# Patient Record
Sex: MALE | Race: WHITE | NOT HISPANIC OR LATINO | Employment: FULL TIME | URBAN - METROPOLITAN AREA
[De-identification: names, ages, dates, MRNs, and addresses within clinical notes are randomized per-mention and may not be internally consistent; named-entity substitution may affect disease eponyms.]

---

## 2019-05-31 ENCOUNTER — OFFICE VISIT (OUTPATIENT)
Dept: FAMILY MEDICINE CLINIC | Facility: CLINIC | Age: 59
End: 2019-05-31
Payer: COMMERCIAL

## 2019-05-31 VITALS
HEART RATE: 73 BPM | SYSTOLIC BLOOD PRESSURE: 152 MMHG | WEIGHT: 194 LBS | DIASTOLIC BLOOD PRESSURE: 90 MMHG | BODY MASS INDEX: 33.12 KG/M2 | HEIGHT: 64 IN | OXYGEN SATURATION: 98 % | RESPIRATION RATE: 16 BRPM

## 2019-05-31 DIAGNOSIS — E78.5 BORDERLINE HYPERLIPIDEMIA: ICD-10-CM

## 2019-05-31 DIAGNOSIS — Z23 NEED FOR TDAP VACCINATION: ICD-10-CM

## 2019-05-31 DIAGNOSIS — Z12.5 PROSTATE CANCER SCREENING: ICD-10-CM

## 2019-05-31 DIAGNOSIS — I10 BENIGN ESSENTIAL HYPERTENSION: ICD-10-CM

## 2019-05-31 DIAGNOSIS — J30.2 SEASONAL ALLERGIES: ICD-10-CM

## 2019-05-31 DIAGNOSIS — Z76.89 ENCOUNTER TO ESTABLISH CARE: Primary | ICD-10-CM

## 2019-05-31 DIAGNOSIS — Z00.00 ANNUAL PHYSICAL EXAM: ICD-10-CM

## 2019-05-31 PROCEDURE — 99386 PREV VISIT NEW AGE 40-64: CPT | Performed by: FAMILY MEDICINE

## 2019-05-31 PROCEDURE — 90715 TDAP VACCINE 7 YRS/> IM: CPT | Performed by: FAMILY MEDICINE

## 2019-05-31 PROCEDURE — 90471 IMMUNIZATION ADMIN: CPT | Performed by: FAMILY MEDICINE

## 2019-05-31 PROCEDURE — 99203 OFFICE O/P NEW LOW 30 MIN: CPT | Performed by: FAMILY MEDICINE

## 2019-05-31 RX ORDER — METOPROLOL TARTRATE 50 MG/1
TABLET, FILM COATED ORAL 2 TIMES DAILY
COMMUNITY
Start: 2008-10-06 | End: 2019-05-31 | Stop reason: SDUPTHER

## 2019-05-31 RX ORDER — LORATADINE 10 MG/1
10 TABLET ORAL DAILY
COMMUNITY

## 2019-05-31 RX ORDER — METOPROLOL TARTRATE 50 MG/1
50 TABLET, FILM COATED ORAL 2 TIMES DAILY
Qty: 60 TABLET | Refills: 0 | Status: SHIPPED | OUTPATIENT
Start: 2019-05-31 | End: 2019-06-28 | Stop reason: SDUPTHER

## 2019-05-31 RX ORDER — FLUTICASONE PROPIONATE 50 MCG
1 SPRAY, SUSPENSION (ML) NASAL DAILY
Qty: 1 BOTTLE | Refills: 0 | Status: SHIPPED | OUTPATIENT
Start: 2019-05-31 | End: 2019-11-14 | Stop reason: HOSPADM

## 2019-06-06 LAB
ALBUMIN SERPL-MCNC: 4.9 G/DL (ref 3.5–5.5)
ALBUMIN/CREAT UR: 3.1 MG/G CREAT (ref 0–30)
ALBUMIN/GLOB SERPL: 2.2 {RATIO} (ref 1.2–2.2)
ALP SERPL-CCNC: 62 IU/L (ref 39–117)
ALT SERPL-CCNC: 24 IU/L (ref 0–44)
AST SERPL-CCNC: 19 IU/L (ref 0–40)
BASOPHILS # BLD AUTO: 0.1 X10E3/UL (ref 0–0.2)
BASOPHILS NFR BLD AUTO: 1 %
BILIRUB SERPL-MCNC: 0.5 MG/DL (ref 0–1.2)
BUN SERPL-MCNC: 21 MG/DL (ref 6–24)
BUN/CREAT SERPL: 18 (ref 9–20)
CALCIUM SERPL-MCNC: 9.7 MG/DL (ref 8.7–10.2)
CHLORIDE SERPL-SCNC: 103 MMOL/L (ref 96–106)
CHOLEST SERPL-MCNC: 243 MG/DL (ref 100–199)
CO2 SERPL-SCNC: 23 MMOL/L (ref 20–29)
CREAT SERPL-MCNC: 1.17 MG/DL (ref 0.76–1.27)
CREAT UR-MCNC: 234.9 MG/DL
EOSINOPHIL # BLD AUTO: 0.2 X10E3/UL (ref 0–0.4)
EOSINOPHIL NFR BLD AUTO: 3 %
ERYTHROCYTE [DISTWIDTH] IN BLOOD BY AUTOMATED COUNT: 14.2 % (ref 12.3–15.4)
GLOBULIN SER-MCNC: 2.2 G/DL (ref 1.5–4.5)
GLUCOSE SERPL-MCNC: 99 MG/DL (ref 65–99)
HCT VFR BLD AUTO: 50.4 % (ref 37.5–51)
HDLC SERPL-MCNC: 39 MG/DL
HGB BLD-MCNC: 16.9 G/DL (ref 13–17.7)
IMM GRANULOCYTES # BLD: 0 X10E3/UL (ref 0–0.1)
IMM GRANULOCYTES NFR BLD: 0 %
LABCORP COMMENT: NORMAL
LDLC SERPL CALC-MCNC: 162 MG/DL (ref 0–99)
LYMPHOCYTES # BLD AUTO: 2.1 X10E3/UL (ref 0.7–3.1)
LYMPHOCYTES NFR BLD AUTO: 40 %
MCH RBC QN AUTO: 29 PG (ref 26.6–33)
MCHC RBC AUTO-ENTMCNC: 33.5 G/DL (ref 31.5–35.7)
MCV RBC AUTO: 87 FL (ref 79–97)
MICROALBUMIN UR-MCNC: 7.3 UG/ML
MONOCYTES # BLD AUTO: 0.4 X10E3/UL (ref 0.1–0.9)
MONOCYTES NFR BLD AUTO: 7 %
NEUTROPHILS # BLD AUTO: 2.5 X10E3/UL (ref 1.4–7)
NEUTROPHILS NFR BLD AUTO: 49 %
PLATELET # BLD AUTO: 212 X10E3/UL (ref 150–450)
POTASSIUM SERPL-SCNC: 4.4 MMOL/L (ref 3.5–5.2)
PROT SERPL-MCNC: 7.1 G/DL (ref 6–8.5)
PSA SERPL-MCNC: 3 NG/ML (ref 0–4)
RBC # BLD AUTO: 5.82 X10E6/UL (ref 4.14–5.8)
SL AMB EGFR AFRICAN AMERICAN: 79 ML/MIN/1.73
SL AMB EGFR NON AFRICAN AMERICAN: 68 ML/MIN/1.73
SL AMB VLDL CHOLESTEROL CALC: 42 MG/DL (ref 5–40)
SODIUM SERPL-SCNC: 144 MMOL/L (ref 134–144)
TRIGL SERPL-MCNC: 209 MG/DL (ref 0–149)
WBC # BLD AUTO: 5.2 X10E3/UL (ref 3.4–10.8)

## 2019-06-28 ENCOUNTER — OFFICE VISIT (OUTPATIENT)
Dept: FAMILY MEDICINE CLINIC | Facility: CLINIC | Age: 59
End: 2019-06-28
Payer: COMMERCIAL

## 2019-06-28 VITALS
WEIGHT: 194 LBS | RESPIRATION RATE: 16 BRPM | OXYGEN SATURATION: 96 % | DIASTOLIC BLOOD PRESSURE: 80 MMHG | HEART RATE: 85 BPM | HEIGHT: 64 IN | BODY MASS INDEX: 33.12 KG/M2 | SYSTOLIC BLOOD PRESSURE: 136 MMHG

## 2019-06-28 DIAGNOSIS — E78.2 MIXED HYPERLIPIDEMIA: ICD-10-CM

## 2019-06-28 DIAGNOSIS — I10 BENIGN ESSENTIAL HYPERTENSION: Primary | ICD-10-CM

## 2019-06-28 PROBLEM — M47.812 CERVICAL SPONDYLOSIS WITHOUT MYELOPATHY: Status: ACTIVE | Noted: 2019-06-28

## 2019-06-28 PROBLEM — G56.20 ULNAR NERVE ABNORMALITY: Status: ACTIVE | Noted: 2019-06-28

## 2019-06-28 PROBLEM — G56.00 CARPAL TUNNEL SYNDROME: Status: ACTIVE | Noted: 2019-06-28

## 2019-06-28 PROCEDURE — 99214 OFFICE O/P EST MOD 30 MIN: CPT | Performed by: FAMILY MEDICINE

## 2019-06-28 PROCEDURE — 1036F TOBACCO NON-USER: CPT | Performed by: FAMILY MEDICINE

## 2019-06-28 PROCEDURE — 3075F SYST BP GE 130 - 139MM HG: CPT | Performed by: FAMILY MEDICINE

## 2019-06-28 PROCEDURE — 3008F BODY MASS INDEX DOCD: CPT | Performed by: FAMILY MEDICINE

## 2019-06-28 PROCEDURE — 3079F DIAST BP 80-89 MM HG: CPT | Performed by: FAMILY MEDICINE

## 2019-06-28 RX ORDER — ROSUVASTATIN CALCIUM 10 MG/1
10 TABLET, COATED ORAL DAILY
Qty: 30 TABLET | Refills: 0 | Status: SHIPPED | OUTPATIENT
Start: 2019-06-28 | End: 2019-07-26 | Stop reason: SINTOL

## 2019-06-28 RX ORDER — METOPROLOL TARTRATE 50 MG/1
50 TABLET, FILM COATED ORAL 2 TIMES DAILY
Qty: 180 TABLET | Refills: 0 | Status: SHIPPED | OUTPATIENT
Start: 2019-06-28 | End: 2019-06-28 | Stop reason: SDUPTHER

## 2019-06-28 RX ORDER — METOPROLOL TARTRATE 50 MG/1
50 TABLET, FILM COATED ORAL 2 TIMES DAILY
Qty: 60 TABLET | Refills: 0 | Status: SHIPPED | OUTPATIENT
Start: 2019-06-28 | End: 2019-06-28 | Stop reason: SDUPTHER

## 2019-06-28 RX ORDER — METOPROLOL TARTRATE 50 MG/1
50 TABLET, FILM COATED ORAL 2 TIMES DAILY
Qty: 60 TABLET | Refills: 0 | Status: SHIPPED | OUTPATIENT
Start: 2019-06-28 | End: 2019-07-26 | Stop reason: SDUPTHER

## 2019-07-26 ENCOUNTER — OFFICE VISIT (OUTPATIENT)
Dept: FAMILY MEDICINE CLINIC | Facility: CLINIC | Age: 59
End: 2019-07-26
Payer: COMMERCIAL

## 2019-07-26 VITALS
BODY MASS INDEX: 32.95 KG/M2 | SYSTOLIC BLOOD PRESSURE: 140 MMHG | OXYGEN SATURATION: 98 % | WEIGHT: 193 LBS | HEART RATE: 62 BPM | HEIGHT: 64 IN | RESPIRATION RATE: 20 BRPM | DIASTOLIC BLOOD PRESSURE: 86 MMHG

## 2019-07-26 DIAGNOSIS — I10 BENIGN ESSENTIAL HYPERTENSION: Primary | ICD-10-CM

## 2019-07-26 DIAGNOSIS — E78.2 MIXED HYPERLIPIDEMIA: ICD-10-CM

## 2019-07-26 PROCEDURE — 99214 OFFICE O/P EST MOD 30 MIN: CPT | Performed by: FAMILY MEDICINE

## 2019-07-26 PROCEDURE — 1036F TOBACCO NON-USER: CPT | Performed by: FAMILY MEDICINE

## 2019-07-26 PROCEDURE — 3008F BODY MASS INDEX DOCD: CPT | Performed by: FAMILY MEDICINE

## 2019-07-26 RX ORDER — METOPROLOL TARTRATE 50 MG/1
50 TABLET, FILM COATED ORAL 2 TIMES DAILY
Qty: 60 TABLET | Refills: 2 | Status: SHIPPED | OUTPATIENT
Start: 2019-07-26 | End: 2019-09-13 | Stop reason: SDUPTHER

## 2019-07-26 RX ORDER — FENOFIBRATE 145 MG/1
145 TABLET, COATED ORAL DAILY
Qty: 30 TABLET | Refills: 2 | Status: SHIPPED | OUTPATIENT
Start: 2019-07-26 | End: 2019-10-25

## 2019-07-26 NOTE — PROGRESS NOTES
Assessment/Plan:   Diagnoses and all orders for this visit:    Benign essential hypertension  -     metoprolol tartrate (LOPRESSOR) 50 mg tablet; Take 1 tablet (50 mg total) by mouth 2 (two) times a day  - BP in the office 128/88 and on my repeat 140/84 and 140/86   - BP per pt's cuff = 154/98 and 150/96 - advised to take cuff to local pharmacy to get it calibrated   - on BB and tolerating it well   - reviewed nml CMP and urine microalbumin  - has started to exercise and advised continued dietary modifications    - (+) FHx of HTN in both parents   - has to schedule his annual Optho exam   - RTO in 3months for f/u - pt aware and agreeable     Mixed hyperlipidemia  -     fenofibrate (TRICOR) 145 mg tablet; Take 1 tablet (145 mg total) by mouth daily  - (+) cognitive impairment with statin - will switch to Tricor QD     BMI 33 0-33 9,adult  - educated that it takes 3500 abelardo to lose 1lb  - advised to cut down on carbs, 5 servings of fruits/veggies/day, increase water intake (65-80oz/water/day)   - appropriate weight loss goal for men = 1-2lb/week  - per the [employer] Association - 150mins/exercise/week, but to lose and maintain weight 200-300mins/exercise/week    Other orders  -     Cancel: Hepatitis C antibody; Future          Subjective:    Patient ID: Sabiha Rhoades is a 63 y o  male    62yo M presents to the office for f/u   1) HTN  - BP in the office 128/88 and on my repeat 140/84 and 140/86 and on pt's cuff 154/98 and 150/96   - today in the office pt denies F/C/N/V/change in vision/CP/palpitations/SOB/wheezing/abd pain/LE edema  - has to schedule an Optho appt, no occular migraines since last OV   - has started exercising and now running/walking 2miles QD  - diet: "not a lot of beef, eats chicken and rice"   - (+) FHx of HTN in both parents   - not a smoker   2) HL  - Lipids: , , HDL 39,   - current 10yr ASCVD risk of 13 8%   - was started on Crestor 10mg QHS at last OV 1month ago   - denies myalgias, HA but (+) fogginess, difficulty concentrating and falling asleep       The following portions of the patient's history were reviewed and updated as appropriate: allergies, current medications, past family history, past medical history, past social history, past surgical history and problem list     Review of Systems  as per HPI    Objective:  /86 (BP Location: Left arm, Patient Position: Sitting, Cuff Size: Large)   Pulse 62   Resp 20   Ht 5' 4" (1 626 m)   Wt 87 5 kg (193 lb)   SpO2 98%   BMI 33 13 kg/m²    Physical Exam   Constitutional: He is oriented to person, place, and time  He appears well-developed and well-nourished  No distress  HENT:   Head: Normocephalic and atraumatic  Right Ear: External ear normal    Left Ear: External ear normal    Nose: Nose normal    Eyes: Conjunctivae and EOM are normal  Right eye exhibits no discharge  Left eye exhibits no discharge  No scleral icterus  Neck: Normal range of motion  Neck supple  Cardiovascular: Normal rate, regular rhythm and normal heart sounds  Exam reveals no gallop and no friction rub  No murmur heard  Pulmonary/Chest: Effort normal and breath sounds normal  No stridor  No respiratory distress  He has no wheezes  He has no rales  Abdominal: Soft  Bowel sounds are normal    Musculoskeletal: Normal range of motion  He exhibits no edema, tenderness or deformity  Neurological: He is alert and oriented to person, place, and time  Skin: Skin is warm  No rash noted  He is not diaphoretic  No erythema  No pallor  Psychiatric: He has a normal mood and affect  His behavior is normal    Vitals reviewed  BMI Counseling: Body mass index is 33 13 kg/m²  Discussed the patient's BMI with him  The BMI is above average  BMI counseling and education was provided to the patient   Nutrition recommendations include reducing portion sizes, decreasing overall calorie intake, consuming healthier snacks, moderation in carbohydrate intake and reducing intake of cholesterol  Exercise recommendations include exercising 3-5 times per week

## 2019-09-13 DIAGNOSIS — I10 BENIGN ESSENTIAL HYPERTENSION: ICD-10-CM

## 2019-09-13 RX ORDER — METOPROLOL TARTRATE 50 MG/1
TABLET, FILM COATED ORAL
Qty: 180 TABLET | Refills: 0 | Status: SHIPPED | OUTPATIENT
Start: 2019-09-13 | End: 2019-10-25 | Stop reason: SDUPTHER

## 2019-10-25 ENCOUNTER — OFFICE VISIT (OUTPATIENT)
Dept: FAMILY MEDICINE CLINIC | Facility: CLINIC | Age: 59
End: 2019-10-25
Payer: COMMERCIAL

## 2019-10-25 VITALS
SYSTOLIC BLOOD PRESSURE: 124 MMHG | RESPIRATION RATE: 16 BRPM | WEIGHT: 191 LBS | BODY MASS INDEX: 32.61 KG/M2 | HEART RATE: 62 BPM | DIASTOLIC BLOOD PRESSURE: 70 MMHG | HEIGHT: 64 IN | OXYGEN SATURATION: 99 %

## 2019-10-25 DIAGNOSIS — Z23 NEED FOR INFLUENZA VACCINATION: ICD-10-CM

## 2019-10-25 DIAGNOSIS — E78.2 MIXED HYPERLIPIDEMIA: ICD-10-CM

## 2019-10-25 DIAGNOSIS — Z11.59 NEED FOR HEPATITIS C SCREENING TEST: ICD-10-CM

## 2019-10-25 DIAGNOSIS — I10 BENIGN ESSENTIAL HYPERTENSION: Primary | ICD-10-CM

## 2019-10-25 PROBLEM — S46.009A INJURY OF TENDON OF ROTATOR CUFF: Status: ACTIVE | Noted: 2019-06-11

## 2019-10-25 PROCEDURE — 3008F BODY MASS INDEX DOCD: CPT | Performed by: FAMILY MEDICINE

## 2019-10-25 PROCEDURE — 3078F DIAST BP <80 MM HG: CPT | Performed by: FAMILY MEDICINE

## 2019-10-25 PROCEDURE — 90682 RIV4 VACC RECOMBINANT DNA IM: CPT | Performed by: FAMILY MEDICINE

## 2019-10-25 PROCEDURE — 99214 OFFICE O/P EST MOD 30 MIN: CPT | Performed by: FAMILY MEDICINE

## 2019-10-25 PROCEDURE — 3074F SYST BP LT 130 MM HG: CPT | Performed by: FAMILY MEDICINE

## 2019-10-25 PROCEDURE — 90471 IMMUNIZATION ADMIN: CPT | Performed by: FAMILY MEDICINE

## 2019-10-25 RX ORDER — METOPROLOL TARTRATE 50 MG/1
50 TABLET, FILM COATED ORAL 2 TIMES DAILY
Qty: 180 TABLET | Refills: 0 | Status: SHIPPED | OUTPATIENT
Start: 2019-10-25 | End: 2019-12-18 | Stop reason: SDUPTHER

## 2019-10-25 RX ORDER — ROSUVASTATIN CALCIUM 5 MG/1
5 TABLET, COATED ORAL
Qty: 15 TABLET | Refills: 0 | Status: SHIPPED | OUTPATIENT
Start: 2019-10-25 | End: 2019-11-14 | Stop reason: HOSPADM

## 2019-10-25 RX ORDER — METOPROLOL TARTRATE 50 MG/1
50 TABLET, FILM COATED ORAL 2 TIMES DAILY
Qty: 60 TABLET | Refills: 0 | Status: SHIPPED | OUTPATIENT
Start: 2019-10-25 | End: 2019-10-25 | Stop reason: SDUPTHER

## 2019-10-25 NOTE — PROGRESS NOTES
Assessment/Plan:   Diagnoses and all orders for this visit:    Benign essential hypertension  -     Discontinue: metoprolol tartrate (LOPRESSOR) 50 mg tablet; Take 1 tablet (50 mg total) by mouth 2 (two) times a day  -     metoprolol tartrate (LOPRESSOR) 50 mg tablet; Take 1 tablet (50 mg total) by mouth 2 (two) times a day  - BP in the office 130/74 and on my repeat 124/70  - currently on BB on 50mg BID - cont current therapy   - did see Optho 2-3wks ago - will send for consult note  - discussed diet and exercise - of note, pt has lost 2lbs since last OV   - (+) FHx of HTN in both parents   - not a smoker     Need for influenza vaccination  -     Cancel: influenza vaccine, [id number], quadrivalent, 0 5 mL, preservative-free, for adult and pediatric patients 6 mos+ (AFLURIA, FLUARIX, FLULAVAL, FLUZONE)  -     influenza vaccine, [id number], quadrivalent, recombinant, PF, 0 5 mL, for patients 18 yr+ (FLUBLOK)    Mixed hyperlipidemia  -     rosuvastatin (CRESTOR) 5 mg tablet; Take 1 tablet (5 mg total) by mouth every other day  - Lipids (6/5/2019): , , HDL 39,   - current 10yr ASCVD risk of 13 8%   - (+) FHx of HL in Brother   - tried Crestor 10mg QD and Tricor 145mg QD - with cognitive impairment and pt self d/c'd and on Fish Oil  - given his 10yr ASCVD risk of 13 8% it is prudent that he be on a cholesterol lowering agent - will try Crestor 5mg Q48   - RTO in 1month for f/u - pt aware and agreeable     Need for hepatitis C screening test  -     Hepatitis C antibody; Future    Other orders  -     Cancel: influenza vaccine, [id number], quadrivalent, recombinant, PF, 0 5 mL, for patients 18 yr+ (FLUBLOK)        Subjective:    Patient ID: Mian Naidu is a 63 y o  male    64yo M presents to the office for f/u   1) HTN  - BP in the office 130/74 and on my repeat 124/70  - currently on BB on 50mg BID   - went to Optho - Dr Henrietta Fountain 2-3wks ago - per pt, "everything fine"   - today in the office pt denies F/C/N/V/change in vision/CP/palpitations/SOB/wheezing/abd pain/LE edema  - has to schedule an Optho appt, no occular migraines since last OV   - has started exercising and did a 5K for [employer], doing more projects around the house   - diet: "not a lot of beef, eats chicken and rice"   - (+) FHx of HTN in both parents   - not a smoker   2) HL  - Lipids (6/5/2019): , , HDL 39,   - current 10yr ASCVD risk of 13 8%   - was started on Tricor 145mg QHS - still with congnitive impairment (fogginess, difficulty concentrating, falling asleep - couldn't drive) and self d/c'd after a week   - does Fish Oil QD     The following portions of the patient's history were reviewed and updated as appropriate: allergies, current medications, past family history, past medical history, past social history, past surgical history and problem list     Review of Systems  as per HPI    Objective:  /70 (BP Location: Left arm, Patient Position: Sitting, Cuff Size: Standard)   Pulse 62   Resp 16   Ht 5' 4" (1 626 m)   Wt 86 6 kg (191 lb)   SpO2 99%   BMI 32 79 kg/m²    Physical Exam   Constitutional: He is oriented to person, place, and time  He appears well-developed and well-nourished  No distress  HENT:   Head: Normocephalic and atraumatic  Right Ear: External ear normal    Left Ear: External ear normal    Nose: Nose normal    Eyes: Conjunctivae and EOM are normal  Right eye exhibits no discharge  Left eye exhibits no discharge  No scleral icterus  Neck: Normal range of motion  Neck supple  Cardiovascular: Normal rate, regular rhythm and normal heart sounds  Exam reveals no gallop and no friction rub  No murmur heard  Pulmonary/Chest: Effort normal and breath sounds normal  No stridor  No respiratory distress  He has no wheezes  He has no rales  Abdominal: Soft  Bowel sounds are normal    BMI 32 8    Musculoskeletal: Normal range of motion  He exhibits no edema, tenderness or deformity  Neurological: He is alert and oriented to person, place, and time  Skin: Skin is warm  He is not diaphoretic  Psychiatric: He has a normal mood and affect  His behavior is normal    Vitals reviewed  BMI Counseling: Body mass index is 32 79 kg/m²  The BMI is above normal  Nutrition recommendations include reducing portion sizes, decreasing overall calorie intake, 3-5 servings of fruits/vegetables daily and consuming healthier snacks  Exercise recommendations include moderate aerobic physical activity for 150 minutes/week

## 2019-11-11 ENCOUNTER — HOSPITAL ENCOUNTER (INPATIENT)
Facility: HOSPITAL | Age: 59
LOS: 3 days | Discharge: HOME | DRG: 392 | End: 2019-11-14
Attending: EMERGENCY MEDICINE | Admitting: SURGERY
Payer: COMMERCIAL

## 2019-11-11 ENCOUNTER — OFFICE VISIT (OUTPATIENT)
Dept: URGENT CARE | Facility: CLINIC | Age: 59
End: 2019-11-11

## 2019-11-11 ENCOUNTER — APPOINTMENT (EMERGENCY)
Dept: CT IMAGING | Facility: HOSPITAL | Age: 59
DRG: 392 | End: 2019-11-11
Payer: COMMERCIAL

## 2019-11-11 VITALS
RESPIRATION RATE: 16 BRPM | HEIGHT: 63 IN | DIASTOLIC BLOOD PRESSURE: 86 MMHG | WEIGHT: 190.4 LBS | TEMPERATURE: 100.9 F | OXYGEN SATURATION: 98 % | SYSTOLIC BLOOD PRESSURE: 145 MMHG | BODY MASS INDEX: 33.73 KG/M2 | HEART RATE: 82 BPM

## 2019-11-11 DIAGNOSIS — K59.00 CONSTIPATION, UNSPECIFIED CONSTIPATION TYPE: Primary | ICD-10-CM

## 2019-11-11 DIAGNOSIS — K57.20 DIVERTICULITIS OF LARGE INTESTINE WITH PERFORATION WITHOUT BLEEDING: Primary | ICD-10-CM

## 2019-11-11 LAB
ANION GAP SERPL CALCULATED.3IONS-SCNC: 11 MMOL/L (ref 4–13)
BASOPHILS # BLD AUTO: 0.06 THOUSANDS/ΜL (ref 0–0.1)
BASOPHILS NFR BLD AUTO: 0 % (ref 0–1)
BUN SERPL-MCNC: 14 MG/DL (ref 5–25)
CALCIUM SERPL-MCNC: 9.6 MG/DL (ref 8.3–10.1)
CHLORIDE SERPL-SCNC: 98 MMOL/L (ref 100–108)
CO2 SERPL-SCNC: 28 MMOL/L (ref 21–32)
CREAT SERPL-MCNC: 1.07 MG/DL (ref 0.6–1.3)
EOSINOPHIL # BLD AUTO: 0 THOUSAND/ΜL (ref 0–0.61)
EOSINOPHIL NFR BLD AUTO: 0 % (ref 0–6)
ERYTHROCYTE [DISTWIDTH] IN BLOOD BY AUTOMATED COUNT: 12.3 % (ref 11.6–15.1)
GFR SERPL CREATININE-BSD FRML MDRD: 76 ML/MIN/1.73SQ M
GLUCOSE SERPL-MCNC: 119 MG/DL (ref 65–140)
HCT VFR BLD AUTO: 50.2 % (ref 36.5–49.3)
HGB BLD-MCNC: 17 G/DL (ref 12–17)
IMM GRANULOCYTES # BLD AUTO: 0.08 THOUSAND/UL (ref 0–0.2)
IMM GRANULOCYTES NFR BLD AUTO: 1 % (ref 0–2)
LYMPHOCYTES # BLD AUTO: 1.11 THOUSANDS/ΜL (ref 0.6–4.47)
LYMPHOCYTES NFR BLD AUTO: 7 % (ref 14–44)
MCH RBC QN AUTO: 28.8 PG (ref 26.8–34.3)
MCHC RBC AUTO-ENTMCNC: 33.9 G/DL (ref 31.4–37.4)
MCV RBC AUTO: 85 FL (ref 82–98)
MONOCYTES # BLD AUTO: 1.39 THOUSAND/ΜL (ref 0.17–1.22)
MONOCYTES NFR BLD AUTO: 9 % (ref 4–12)
NEUTROPHILS # BLD AUTO: 13.79 THOUSANDS/ΜL (ref 1.85–7.62)
NEUTS SEG NFR BLD AUTO: 83 % (ref 43–75)
NRBC BLD AUTO-RTO: 0 /100 WBCS
PLATELET # BLD AUTO: 212 THOUSANDS/UL (ref 149–390)
PMV BLD AUTO: 10.5 FL (ref 8.9–12.7)
POTASSIUM SERPL-SCNC: 4.1 MMOL/L (ref 3.5–5.3)
RBC # BLD AUTO: 5.9 MILLION/UL (ref 3.88–5.62)
SODIUM SERPL-SCNC: 137 MMOL/L (ref 136–145)
WBC # BLD AUTO: 16.43 THOUSAND/UL (ref 4.31–10.16)

## 2019-11-11 PROCEDURE — 99285 EMERGENCY DEPT VISIT HI MDM: CPT | Performed by: EMERGENCY MEDICINE

## 2019-11-11 PROCEDURE — 36415 COLL VENOUS BLD VENIPUNCTURE: CPT | Performed by: EMERGENCY MEDICINE

## 2019-11-11 PROCEDURE — 80048 BASIC METABOLIC PNL TOTAL CA: CPT | Performed by: EMERGENCY MEDICINE

## 2019-11-11 PROCEDURE — 85025 COMPLETE CBC W/AUTO DIFF WBC: CPT | Performed by: EMERGENCY MEDICINE

## 2019-11-11 PROCEDURE — 99285 EMERGENCY DEPT VISIT HI MDM: CPT

## 2019-11-11 PROCEDURE — 74177 CT ABD & PELVIS W/CONTRAST: CPT

## 2019-11-11 RX ORDER — HYDROMORPHONE HCL/PF 1 MG/ML
0.2 SYRINGE (ML) INJECTION EVERY 4 HOURS PRN
Status: DISCONTINUED | OUTPATIENT
Start: 2019-11-11 | End: 2019-11-12

## 2019-11-11 RX ORDER — CIPROFLOXACIN 2 MG/ML
400 INJECTION, SOLUTION INTRAVENOUS EVERY 12 HOURS
Status: DISCONTINUED | OUTPATIENT
Start: 2019-11-12 | End: 2019-11-14 | Stop reason: HOSPADM

## 2019-11-11 RX ORDER — CIPROFLOXACIN 2 MG/ML
400 INJECTION, SOLUTION INTRAVENOUS ONCE
Status: COMPLETED | OUTPATIENT
Start: 2019-11-11 | End: 2019-11-12

## 2019-11-11 RX ORDER — METOPROLOL TARTRATE 50 MG/1
50 TABLET, FILM COATED ORAL 2 TIMES DAILY
Status: DISCONTINUED | OUTPATIENT
Start: 2019-11-12 | End: 2019-11-14 | Stop reason: HOSPADM

## 2019-11-11 RX ORDER — ACETAMINOPHEN 325 MG/1
650 TABLET ORAL EVERY 6 HOURS PRN
Status: DISCONTINUED | OUTPATIENT
Start: 2019-11-11 | End: 2019-11-14 | Stop reason: HOSPADM

## 2019-11-11 RX ORDER — PRAVASTATIN SODIUM 40 MG
40 TABLET ORAL
Status: DISCONTINUED | OUTPATIENT
Start: 2019-11-12 | End: 2019-11-14 | Stop reason: HOSPADM

## 2019-11-11 RX ADMIN — IOHEXOL 100 ML: 350 INJECTION, SOLUTION INTRAVENOUS at 21:25

## 2019-11-12 PROBLEM — K57.92 DIVERTICULITIS: Status: ACTIVE | Noted: 2019-11-12

## 2019-11-12 LAB
ANION GAP SERPL CALCULATED.3IONS-SCNC: 9 MMOL/L (ref 4–13)
BASOPHILS # BLD AUTO: 0.06 THOUSANDS/ΜL (ref 0–0.1)
BASOPHILS NFR BLD AUTO: 1 % (ref 0–1)
BUN SERPL-MCNC: 11 MG/DL (ref 5–25)
CALCIUM SERPL-MCNC: 8.8 MG/DL (ref 8.3–10.1)
CHLORIDE SERPL-SCNC: 99 MMOL/L (ref 100–108)
CO2 SERPL-SCNC: 27 MMOL/L (ref 21–32)
CREAT SERPL-MCNC: 1.09 MG/DL (ref 0.6–1.3)
EOSINOPHIL # BLD AUTO: 0.01 THOUSAND/ΜL (ref 0–0.61)
EOSINOPHIL NFR BLD AUTO: 0 % (ref 0–6)
ERYTHROCYTE [DISTWIDTH] IN BLOOD BY AUTOMATED COUNT: 12.6 % (ref 11.6–15.1)
GFR SERPL CREATININE-BSD FRML MDRD: 74 ML/MIN/1.73SQ M
GLUCOSE SERPL-MCNC: 160 MG/DL (ref 65–140)
HCT VFR BLD AUTO: 47.4 % (ref 36.5–49.3)
HGB BLD-MCNC: 15.8 G/DL (ref 12–17)
IMM GRANULOCYTES # BLD AUTO: 0.05 THOUSAND/UL (ref 0–0.2)
IMM GRANULOCYTES NFR BLD AUTO: 0 % (ref 0–2)
LYMPHOCYTES # BLD AUTO: 1.06 THOUSANDS/ΜL (ref 0.6–4.47)
LYMPHOCYTES NFR BLD AUTO: 8 % (ref 14–44)
MCH RBC QN AUTO: 28.2 PG (ref 26.8–34.3)
MCHC RBC AUTO-ENTMCNC: 33.3 G/DL (ref 31.4–37.4)
MCV RBC AUTO: 85 FL (ref 82–98)
MONOCYTES # BLD AUTO: 0.97 THOUSAND/ΜL (ref 0.17–1.22)
MONOCYTES NFR BLD AUTO: 7 % (ref 4–12)
NEUTROPHILS # BLD AUTO: 10.88 THOUSANDS/ΜL (ref 1.85–7.62)
NEUTS SEG NFR BLD AUTO: 84 % (ref 43–75)
NRBC BLD AUTO-RTO: 0 /100 WBCS
PLATELET # BLD AUTO: 192 THOUSANDS/UL (ref 149–390)
PMV BLD AUTO: 10.8 FL (ref 8.9–12.7)
POTASSIUM SERPL-SCNC: 3.2 MMOL/L (ref 3.5–5.3)
RBC # BLD AUTO: 5.6 MILLION/UL (ref 3.88–5.62)
SODIUM SERPL-SCNC: 135 MMOL/L (ref 136–145)
WBC # BLD AUTO: 13.03 THOUSAND/UL (ref 4.31–10.16)

## 2019-11-12 PROCEDURE — 85025 COMPLETE CBC W/AUTO DIFF WBC: CPT | Performed by: PHYSICIAN ASSISTANT

## 2019-11-12 PROCEDURE — 80048 BASIC METABOLIC PNL TOTAL CA: CPT | Performed by: PHYSICIAN ASSISTANT

## 2019-11-12 RX ORDER — HYDROMORPHONE HCL/PF 1 MG/ML
0.5 SYRINGE (ML) INJECTION EVERY 4 HOURS PRN
Status: DISCONTINUED | OUTPATIENT
Start: 2019-11-12 | End: 2019-11-14 | Stop reason: HOSPADM

## 2019-11-12 RX ORDER — HYDROMORPHONE HCL/PF 1 MG/ML
0.2 SYRINGE (ML) INJECTION EVERY 4 HOURS PRN
Status: DISCONTINUED | OUTPATIENT
Start: 2019-11-12 | End: 2019-11-14 | Stop reason: HOSPADM

## 2019-11-12 RX ORDER — HYDROMORPHONE HCL/PF 1 MG/ML
1 SYRINGE (ML) INJECTION EVERY 4 HOURS PRN
Status: DISCONTINUED | OUTPATIENT
Start: 2019-11-12 | End: 2019-11-14 | Stop reason: HOSPADM

## 2019-11-12 RX ADMIN — METRONIDAZOLE 500 MG: 500 INJECTION, SOLUTION INTRAVENOUS at 22:54

## 2019-11-12 RX ADMIN — METRONIDAZOLE 500 MG: 500 INJECTION, SOLUTION INTRAVENOUS at 01:26

## 2019-11-12 RX ADMIN — ENOXAPARIN SODIUM 40 MG: 40 INJECTION SUBCUTANEOUS at 09:20

## 2019-11-12 RX ADMIN — CIPROFLOXACIN 400 MG: 2 INJECTION, SOLUTION INTRAVENOUS at 09:20

## 2019-11-12 RX ADMIN — METOPROLOL TARTRATE 50 MG: 50 TABLET, FILM COATED ORAL at 09:20

## 2019-11-12 RX ADMIN — CIPROFLOXACIN 400 MG: 2 INJECTION, SOLUTION INTRAVENOUS at 21:34

## 2019-11-12 RX ADMIN — METOPROLOL TARTRATE 50 MG: 50 TABLET, FILM COATED ORAL at 17:12

## 2019-11-12 RX ADMIN — METRONIDAZOLE 500 MG: 500 INJECTION, SOLUTION INTRAVENOUS at 06:37

## 2019-11-12 RX ADMIN — METRONIDAZOLE 500 MG: 500 INJECTION, SOLUTION INTRAVENOUS at 14:37

## 2019-11-12 RX ADMIN — CIPROFLOXACIN 400 MG: 2 INJECTION, SOLUTION INTRAVENOUS at 00:11

## 2019-11-12 NOTE — ED CARE HANDOFF
Emergency Department Sign Out Note        Sign out and transfer of care from Dr Reagan Sanon  See Separate Emergency Department note  The patient, Pierce Scanlon, was evaluated by the previous provider for abdominal pain  Workup Completed:  no    ED Course / Workup Pending (followup): Waiting for CT A/P                             Procedures  MDM  Number of Diagnoses or Management Options  Diverticulitis of large intestine with perforation without bleeding: new and requires workup  Diagnosis management comments: 11:05 PM  CT abdomen pelvis shows significant diverticulitis with several areas of micro perforation  Discussed the case with General surgery aPC who has agreed to admit the patient under Dr Humza Hastings service  Patient and family updated regarding the findings  Declines pain medication at this time         Amount and/or Complexity of Data Reviewed  Clinical lab tests: reviewed  Tests in the radiology section of CPT®: reviewed  Discuss the patient with other providers: yes    Risk of Complications, Morbidity, and/or Mortality  Presenting problems: high  Diagnostic procedures: high  Management options: high    Patient Progress  Patient progress: stable      Disposition  Final diagnoses:   Diverticulitis of large intestine with perforation without bleeding     Time reflects when diagnosis was documented in both MDM as applicable and the Disposition within this note     Time User Action Codes Description Comment    11/11/2019 10:17 PM Alee Rasheed Add [K57 20] Diverticulitis of large intestine with perforation without bleeding       ED Disposition     ED Disposition Condition Date/Time Comment    Admit Stable Mon Nov 11, 2019 10:16 PM Case was discussed with General Surgery and the patient's admission status was agreed to be Admission Status: inpatient status to the service of Dr Rakan Stevens    None       Patient's Medications   Discharge Prescriptions    No medications on file     No discharge procedures on file         ED Provider  Electronically Signed by     John Severino DO  11/11/19 6364

## 2019-11-12 NOTE — PLAN OF CARE
Problem: PAIN - ADULT  Goal: Verbalizes/displays adequate comfort level or baseline comfort level  Description  Interventions:  - Encourage patient to monitor pain and request assistance  - Assess pain using appropriate pain scale  - Administer analgesics based on type and severity of pain and evaluate response  - Implement non-pharmacological measures as appropriate and evaluate response  - Consider cultural and social influences on pain and pain management  - Notify physician/advanced practitioner if interventions unsuccessful or patient reports new pain  Outcome: Progressing     Problem: INFECTION - ADULT  Goal: Absence or prevention of progression during hospitalization  Description  INTERVENTIONS:  - Assess and monitor for signs and symptoms of infection  - Monitor lab/diagnostic results  - Monitor all insertion sites, i e  indwelling lines, tubes, and drains  - Monitor endotracheal if appropriate and nasal secretions for changes in amount and color  - [place] appropriate cooling/warming therapies per order  - Administer medications as ordered  - Instruct and encourage patient and family to use good hand hygiene technique  - Identify and instruct in appropriate isolation precautions for identified infection/condition  Outcome: Progressing  Goal: Absence of fever/infection during neutropenic period  Description  INTERVENTIONS:  - Monitor WBC    Outcome: Progressing     Problem: SAFETY ADULT  Goal: Patient will remain free of falls  Description  INTERVENTIONS:  - Assess patient frequently for physical needs  -  Identify cognitive and physical deficits and behaviors that affect risk of falls    -  [place] fall precautions as indicated by assessment   - Educate patient/family on patient safety including physical limitations  - Instruct patient to call for assistance with activity based on assessment  - Modify environment to reduce risk of injury  - Consider OT/PT consult to assist with strengthening/mobility  Outcome: Progressing  Goal: Maintain or return to baseline ADL function  Description  INTERVENTIONS:  -  Assess patient's ability to carry out ADLs; assess patient's baseline for ADL function and identify physical deficits which impact ability to perform ADLs (bathing, care of mouth/teeth, toileting, grooming, dressing, etc )  - Assess/evaluate cause of self-care deficits   - Assess range of motion  - Assess patient's mobility; develop plan if impaired  - Assess patient's need for assistive devices and provide as appropriate  - Encourage maximum independence but intervene and supervise when necessary  - Involve family in performance of ADLs  - Assess for home care needs following discharge   - Consider OT consult to assist with ADL evaluation and planning for discharge  - Provide patient education as appropriate  Outcome: Progressing  Goal: Maintain or return mobility status to optimal level  Description  INTERVENTIONS:  - Assess patient's baseline mobility status (ambulation, transfers, stairs, etc )    - Identify cognitive and physical deficits and behaviors that affect mobility  - Identify mobility aids required to assist with transfers and/or ambulation (gait belt, sit-to-stand, lift, walker, cane, etc )  - [place] fall precautions as indicated by assessment  - Record patient progress and toleration of activity level on Mobility SBAR; progress patient to next Phase/Stage  - Instruct patient to call for assistance with activity based on assessment  - Consider rehabilitation consult to assist with strengthening/weightbearing, etc   Outcome: Progressing

## 2019-11-12 NOTE — UTILIZATION REVIEW
Initial Clinical Review    Admission: Date/Time/Statement: Inpatient Admission Orders (From admission, onward)     Ordered        11/11/19 2249  Inpatient Admission (expected length of stay for this patient Order details is greater than two midnights)  Once                   Orders Placed This Encounter   Procedures    Inpatient Admission (expected length of stay for this patient Order details is greater than two midnights)     Standing Status:   Standing     Number of Occurrences:   1     Order Specific Question:   Admitting Physician     Answer:   LIZANDRO Koch [[id number]]     Order Specific Question:   Level of Care     Answer:   Med Surg [[id number]]     Order Specific Question:   Estimated length of stay     Answer:   More than 2 Midnights     Order Specific Question:   Certification     Answer:   I certify that inpatient services are medically necessary for this patient for a duration of greater than two midnights  See H&P and MD Progress Notes for additional information about the patient's course of treatment  ED Arrival Information     Expected Arrival Acuity Means of Arrival Escorted By Service Admission Type    - 11/11/2019 18:54 Urgent Walk-In Self Surgery-General Urgent    Arrival Complaint    IMPACTED BOWEL        Chief Complaint   Patient presents with    Constipation     pt  comes in after being seen at [facility] and was told to come to the ER due to impacted bowel  Pt  states he has been having loose stools all day and it feels like somethings is blocking him from going fully  Pt states he has some tenderness on palpation when pushing on the abdomen  He states he tried a fleet enema with laxatives at home with no relief  Assessment/Plan: This is a 63year old male from home to ED per urgent care admitted to inpatient due to diverticulitis with microperforation  Presented with worsening LLQ abdominal pain, unable to have BM, only passing small amount of liquid for last day    On exam has tenderness of LLQ, rectal guaiac negative stool, no evidence of fecal impaction  Rectal vault empty of stool  Febrile  WBC 16 43   CT showed acute diverticulitis wth pneumoperitoneum  Pain control and IV antibiotics in progress  On 11/12/2019-  Had persistent mild left lower abdominal quadrant discomfort  Continue IV antibiotics  Leukocytosis improving  ED Triage Vitals   Temperature Pulse Respirations Blood Pressure SpO2   11/11/19 1914 11/11/19 1913 11/11/19 1913 11/11/19 1913 11/11/19 1913   99 2 °F (37 3 °C) 82 14 169/86 100 %      Temp Source Heart Rate Source Patient Position - Orthostatic VS BP Location FiO2 (%)   11/11/19 1914 11/11/19 1913 11/11/19 1913 11/11/19 1913 --   Oral Monitor Sitting Left arm       Pain Score       11/11/19 2335       5        Wt Readings from Last 1 Encounters:   11/11/19 84 3 kg (185 lb 13 6 oz)     Additional Vital Signs:   11/12/19 0900  98 2 °F (36 8 °C)  73  16  131/75  95 %  None (Room air)  Lying   11/12/19 0017  99 6 °F (37 6 °C)               11/11/19 2335  101 °F (38 3 °C)Abnormal   82  18  135/77  97 %  None (Room air)       Pertinent Labs/Diagnostic Test Results:   11/11/2019 CT abdomen - Colonic diverticulosis with significant stranding around the sigmoid colon representing acute diverticulitis  Tiny droplets of pneumoperitoneum  Thickening of the anterior portion of the urinary bladder which may represent cystitis    Results from last 7 days   Lab Units 11/12/19 0908 11/11/19 2010   WBC Thousand/uL 13 03* 16 43*   HEMOGLOBIN g/dL 15 8 17 0   HEMATOCRIT % 47 4 50 2*   PLATELETS Thousands/uL 192 212   NEUTROS ABS Thousands/µL 10 88* 13 79*     Results from last 7 days   Lab Units 11/12/19  0908 11/11/19 2010   SODIUM mmol/L 135* 137   POTASSIUM mmol/L 3 2* 4 1   CHLORIDE mmol/L 99* 98*   CO2 mmol/L 27 28   ANION GAP mmol/L 9 11   BUN mg/dL 11 14   CREATININE mg/dL 1 09 1 07   EGFR ml/min/1 73sq m 74 76   CALCIUM mg/dL 8 8 9 6     Results from last 7 days   Lab Units 11/12/19  0908 11/11/19 2010   GLUCOSE RANDOM mg/dL 160* 119       ED Treatment:  No medication  Medication Administration from 11/11/2019 1854 to 11/11/2019 2329       Date/Time Order Dose Route Action Action by Comments        Past Medical History:   Diagnosis Date    Cervical spondylosis without myelopathy 6/28/2019     Present on Admission:   Diverticulitis    Admitting Diagnosis: Constipated [K59 00]  Diverticulitis of large intestine with perforation without bleeding [K57 20]  Age/Sex: 63 y o  male  Admission Orders: 11/11/2019  2249 INPATIENT   Scheduled Medications:  Medications:  ciprofloxacin 400 mg Intravenous Q12H   enoxaparin 40 mg Subcutaneous Q24H YOANA   metoprolol tartrate 50 mg Oral BID   metroNIDAZOLE 500 mg Intravenous Q8H   pravastatin 40 mg Oral Daily With Dinner     PRN Meds: not used  acetaminophen 650 mg Oral Q6H PRN   HYDROmorphone 0 2 mg Intravenous Q4H PRN   HYDROmorphone 0 5 mg Intravenous Q4H PRN   HYDROmorphone 1 mg Intravenous Q4H PRN     Clears    Network Utilization Review Department  [email or web address]  ATTENTION: Please call with any questions or concerns to [phone] and carefully listen to the prompts so that you are directed to the right person  All voicemails are confidential   Scott Boyd all requests for admission clinical reviews, approved or denied determinations and any other requests to dedicated fax number below belonging to the campus where the patient is receiving treatment    FACILITY NAME UR FAX NUMBER   ADMISSION DENIALS (Administrative/Medical Necessity) [phone]   PARENT CHILD HEALTH (Maternity/NICU/Pediatrics) [phone]   [facility] [phone]   [facility] [address] [facility] [phone]   [address] [facility] [phone]   [address] [facility],  [phone]

## 2019-11-12 NOTE — ED PROVIDER NOTES
History  Chief Complaint   Patient presents with    Constipation     pt  comes in after being seen at [facility] now and was told to come to the ER due to impacted bowel  Pt  states he has been having loose stools all day and it feels like somethings is blocking him from going fully  Pt states he has some tenderness on palpation when pushing on the abdomen  He states he tried a fleet enema with laxatives at home with no relief  History provided by:  Patient  Abdominal Pain   Pain location:  LLQ  Pain quality: not aching    Pain radiates to:  Does not radiate  Pain severity:  Moderate  Onset quality:  Gradual  Duration:  1 day  Timing:  Constant  Progression:  Worsening  Chronicity:  New  Context: not trauma    Context comment:  Patient typically has 3 bowel movements today, yesterday only had 1 in the morning, today only passed a small amount of liquid, went to urgent care was referred here  Relieved by:  Nothing  Worsened by:  Palpation  Ineffective treatments:  None tried  Associated symptoms: constipation    Associated symptoms: no chest pain, no chills, no cough, no diarrhea, no dysuria, no fever, no nausea, no shortness of breath, no sore throat and no vomiting        Prior to Admission Medications   Prescriptions Last Dose Informant Patient Reported? Taking?    B Complex Vitamins (VITAMIN B-COMPLEX PO) 2019 at Unknown time  Yes Yes   Sig: Take by mouth   CINNAMON PO 2019 at Unknown time  Yes Yes   Sig: Take by mouth   Glucosamine-Chondroit-Vit C-Mn (GLUCOSAMINE 1500 COMPLEX PO) 2019 at Unknown time  Yes Yes   Sig: Take by mouth   VITAMIN E PO 11/10/2019 at Unknown time  Yes Yes   Sig: Take by mouth   fluticasone (FLONASE) 50 mcg/act nasal spray Not Taking at Unknown time  No No   Si spray into each nostril daily   Patient not taking: Reported on 2019   loratadine (CLARITIN) 10 mg tablet 11/10/2019 at Unknown time Self Yes Yes   Sig: Take 10 mg by mouth daily   metoprolol tartrate (LOPRESSOR) 50 mg tablet 11/11/2019 at 0800  No Yes   Sig: Take 1 tablet (50 mg total) by mouth 2 (two) times a day   rosuvastatin (CRESTOR) 5 mg tablet Not Taking at Unknown time  No No   Sig: Take 1 tablet (5 mg total) by mouth every other day   Patient not taking: Reported on 11/11/2019      Facility-Administered Medications: None       Past Medical History:   Diagnosis Date    Cervical spondylosis without myelopathy 6/28/2019       Past Surgical History:   Procedure Laterality Date    HERNIA REPAIR Left 1997       Family History   Problem Relation Age of Onset    Diabetes Mother     Hypertension Mother     Coronary artery disease Mother     Hypertension Father     Dementia Father     Alzheimer's disease Father     Colon cancer Father 79    Hyperlipidemia Brother     Allergies Son     Diabetes Maternal Grandmother     Prostate cancer Neg Hx     Testicular cancer Neg Hx      I have reviewed and agree with the history as documented  Social History     Tobacco Use    Smoking status: Never Smoker    Smokeless tobacco: Never Used   Substance Use Topics    Alcohol use: Yes     Frequency: Monthly or less     Drinks per session: 1 or 2    Drug use: Never        Review of Systems   Constitutional: Negative for activity change, chills, diaphoresis and fever  HENT: Negative for congestion, sinus pressure and sore throat  Eyes: Negative for pain and visual disturbance  Respiratory: Negative for cough, chest tightness, shortness of breath, wheezing and stridor  Cardiovascular: Negative for chest pain and palpitations  Gastrointestinal: Positive for abdominal pain and constipation  Negative for abdominal distention, diarrhea, nausea and vomiting  Genitourinary: Negative for dysuria and frequency  Musculoskeletal: Negative for neck pain and neck stiffness  Skin: Negative for rash  Neurological: Negative for dizziness, speech difficulty, light-headedness, numbness and headaches  Physical Exam  Physical Exam   Constitutional: He is oriented to person, place, and time  He appears well-developed  No distress  HENT:   Head: Normocephalic and atraumatic  Eyes: Pupils are equal, round, and reactive to light  Neck: Normal range of motion  Neck supple  No tracheal deviation present  Cardiovascular: Normal rate, regular rhythm, normal heart sounds and intact distal pulses  No murmur heard  Pulmonary/Chest: Effort normal and breath sounds normal  No stridor  No respiratory distress  Abdominal: Soft  He exhibits no distension  There is tenderness (LLQ )  There is no rebound and no guarding  Genitourinary: Rectal exam shows guaiac negative stool (Guaiac negative, no evidence of fecal impaction on rectal exam, rectal vault empty of stool, small amount was obtained/mucosa which was guaiac negative)  Musculoskeletal: Normal range of motion  Neurological: He is alert and oriented to person, place, and time  Skin: Skin is warm and dry  He is not diaphoretic  No erythema  No pallor  Psychiatric: He has a normal mood and affect  Vitals reviewed        Vital Signs  ED Triage Vitals   Temperature Pulse Respirations Blood Pressure SpO2   11/11/19 1914 11/11/19 1913 11/11/19 1913 11/11/19 1913 11/11/19 1913   99 2 °F (37 3 °C) 82 14 169/86 100 %      Temp Source Heart Rate Source Patient Position - Orthostatic VS BP Location FiO2 (%)   11/11/19 1914 11/11/19 1913 11/11/19 1913 11/11/19 1913 --   Oral Monitor Sitting Left arm       Pain Score       11/11/19 2335       5           Vitals:    11/13/19 1500 11/13/19 1758 11/13/19 2238 11/14/19 0840   BP: 123/68 136/74 129/75 131/75   Pulse: 72 77 70 68   Patient Position - Orthostatic VS:   Lying Sitting         Visual Acuity      ED Medications  Medications   iohexol (OMNIPAQUE) 350 MG/ML injection (MULTI-DOSE) 100 mL (100 mL Intravenous Given 11/11/19 2125)   ciprofloxacin (CIPRO) IVPB (premix) 400 mg (400 mg Intravenous New Bag 11/12/19 0011)   metroNIDAZOLE (FLAGYL) IVPB (premix) 500 mg (500 mg Intravenous New Bag 11/12/19 0126)   potassium chloride (K-DUR,KLOR-CON) CR tablet 40 mEq (40 mEq Oral Given 11/13/19 0928)       Diagnostic Studies  Results Reviewed     Procedure Component Value Units Date/Time    Basic metabolic panel [[id number]]  (Abnormal) Collected:  11/11/19 2010    Lab Status:  Final result Specimen:  Blood from Arm, Right Updated:  11/11/19 2035     Sodium 137 mmol/L      Potassium 4 1 mmol/L      Chloride 98 mmol/L      CO2 28 mmol/L      ANION GAP 11 mmol/L      BUN 14 mg/dL      Creatinine 1 07 mg/dL      Glucose 119 mg/dL      Calcium 9 6 mg/dL      eGFR 76 ml/min/1 73sq m     Narrative:       [place] guidelines for Chronic Kidney Disease (CKD):     Stage 1 with normal or high GFR (GFR > 90 mL/min/1 73 square meters)    Stage 2 Mild CKD (GFR = 60-89 mL/min/1 73 square meters)    Stage 3A Moderate CKD (GFR = 45-59 mL/min/1 73 square meters)    Stage 3B Moderate CKD (GFR = 30-44 mL/min/1 73 square meters)    Stage 4 Severe CKD (GFR = 15-29 mL/min/1 73 square meters)    Stage 5 End Stage CKD (GFR <15 mL/min/1 73 square meters)  Note: GFR calculation is accurate only with a steady state creatinine    CBC and differential [[id number]]  (Abnormal) Collected:  11/11/19 2010    Lab Status:  Final result Specimen:  Blood from Arm, Right Updated:  11/11/19 2016     WBC 16 43 Thousand/uL      RBC 5 90 Million/uL      Hemoglobin 17 0 g/dL      Hematocrit 50 2 %      MCV 85 fL      MCH 28 8 pg      MCHC 33 9 g/dL      RDW 12 3 %      MPV 10 5 fL      Platelets 288 Thousands/uL      nRBC 0 /100 WBCs      Neutrophils Relative 83 %      Immat GRANS % 1 %      Lymphocytes Relative 7 %      Monocytes Relative 9 %      Eosinophils Relative 0 %      Basophils Relative 0 %      Neutrophils Absolute 13 79 Thousands/µL      Immature Grans Absolute 0 08 Thousand/uL      Lymphocytes Absolute 1 11 Thousands/µL Monocytes Absolute 1 39 Thousand/µL      Eosinophils Absolute 0 00 Thousand/µL      Basophils Absolute 0 06 Thousands/µL                  CT abdomen pelvis with contrast   Final Result by Karl Hernandez DO (11/11 5885)      Colonic diverticulosis with significant stranding around the sigmoid colon representing acute diverticulitis  Tiny droplets of pneumoperitoneum  Thickening of the anterior portion of the urinary bladder which may represent cystitis  I personally discussed this study with Dr Antonette Arreguin on 11/11/2019 at 10:09 PM                      Workstation performed: [id number]                    Procedures  Procedures       ED Course                               MDM  Number of Diagnoses or Management Options  Diverticulitis of large intestine with perforation without bleeding: new and requires workup  Diagnosis management comments:       Initial ED assessment:  52-year-old male, left lower quadrant abdominal pain and tenderness    Initial DDx includes but is not limited to:   Diverticulitis, colitis, mesenteric adenitis, appendicitis, intra-abdominal malignancy    Initial ED plan:   Blood work, CT scan, disposition pending ED workup        Final ED summary/disposition:   After evaluation and workup in the emergency department, found have diverticulitis with multiple areas micro perforation    Minute hospital for IV antibiotics        Amount and/or Complexity of Data Reviewed  Clinical lab tests: ordered and reviewed  Tests in the radiology section of CPT®: ordered and reviewed  Decide to obtain previous medical records or to obtain history from someone other than the patient: yes  Obtain history from someone other than the patient: yes  Review and summarize past medical records: yes  Discuss the patient with other providers: yes  Independent visualization of images, tracings, or specimens: yes        Disposition  Final diagnoses:   Diverticulitis of large intestine with perforation without bleeding     Time reflects when diagnosis was documented in both MDM as applicable and the Disposition within this note     Time User Action Codes Description Comment    11/11/2019 10:17 PM Katerina Dickson Add [K57 20] Diverticulitis of large intestine with perforation without bleeding       ED Disposition     ED Disposition Condition Date/Time Comment    Admit Stable Mon Nov 11, 2019 10:16 PM Case was discussed with General Surgery and the patient's admission status was agreed to be Admission Status: inpatient status to the service of Dr Guzmán Gins up With Specialties Details Why [address], MD General Surgery Schedule an appointment as soon as possible for a visit in 2 week(s)  [address]  [address] [facility] 3  [phone]            Discharge Medication List as of 11/14/2019  9:33 AM      START taking these medications    Details   acetaminophen (TYLENOL) 325 mg tablet Take 2 tablets (650 mg total) by mouth every 6 (six) hours as needed for mild pain, moderate pain, headaches or fever, Starting Thu 11/14/2019, Normal      ciprofloxacin (CIPRO) 500 mg tablet Take 1 tablet (500 mg total) by mouth every 12 (twelve) hours for 7 days, Starting Thu 11/14/2019, Until Thu 11/21/2019, Normal      metroNIDAZOLE (FLAGYL) 250 mg tablet Take 1 tablet (250 mg total) by mouth every 8 (eight) hours for 7 days, Starting Thu 11/14/2019, Until Thu 11/21/2019, Normal         CONTINUE these medications which have NOT CHANGED    Details   B Complex Vitamins (VITAMIN B-COMPLEX PO) Take by mouth, Historical Med      CINNAMON PO Take by mouth, Historical Med      Glucosamine-Chondroit-Vit C-Mn (GLUCOSAMINE 1500 COMPLEX PO) Take by mouth, Historical Med      loratadine (CLARITIN) 10 mg tablet Take 10 mg by mouth daily, Historical Med      metoprolol tartrate (LOPRESSOR) 50 mg tablet Take 1 tablet (50 mg total) by mouth 2 (two) times a day, Starting Fri 10/25/2019, Normal VITAMIN E PO Take by mouth, Historical Med         STOP taking these medications       fluticasone (FLONASE) 50 mcg/act nasal spray Comments:   Reason for Stopping:         rosuvastatin (CRESTOR) 5 mg tablet Comments:   Reason for Stopping:             Outpatient Discharge Orders   Discharge Diet     Activity as tolerated     Call provider for:  persistent nausea or vomiting     Call provider for:  severe uncontrolled pain       ED Provider  Electronically Signed by           Tereza Candelario DO  11/14/19 5698

## 2019-11-12 NOTE — PROGRESS NOTES
Progress Note -Surgery PA  Shahid Berry 63 y o  male MRN: [id number]  Unit/Bed#: S -01 Encounter: [id number]      Assessment:  63year old male with diverticulitis with microperf  Plan:  -check labs today  -IVF  -ambulate  -pain control  -tylenol for fever  -continue cipro/flagyl   -dvt ppx        Subjective/Objective     Subjective: Feels better today  Tolerating clears  Pain improved  Spiked temp to 101 overnight  Objective:     /77 (BP Location: Left arm)   Pulse 82   Temp 99 6 °F (37 6 °C) (Oral)   Resp 18   Ht 5' 3" (1 6 m)   Wt 84 3 kg (185 lb 13 6 oz)   SpO2 97%   BMI 32 92 kg/m²   No intake/output data recorded        No intake or output data in the 24 hours ending 11/12/19 0726    Invasive Devices     Peripheral Intravenous Line            Peripheral IV 11/11/19 Right Antecubital less than 1 day                Physical Exam:  /77 (BP Location: Left arm)   Pulse 82   Temp 99 6 °F (37 6 °C) (Oral)   Resp 18   Ht 5' 3" (1 6 m)   Wt 84 3 kg (185 lb 13 6 oz)   SpO2 97%   BMI 32 92 kg/m²   General appearance: alert and oriented, in no acute distress and alert  Lungs: clear to auscultation bilaterally  Heart: regular rate and rhythm, S1, S2 normal, no murmur, click, rub or gallop  Abdomen: soft, non-tender; bowel sounds normal; no masses,  no organomegaly      Current Facility-Administered Medications:     acetaminophen (TYLENOL) tablet 650 mg, 650 mg, Oral, Q6H PRN, Mary Grace Ventura PA-C    ciprofloxacin (CIPRO) IVPB (premix) 400 mg, 400 mg, Intravenous, Q12H, Mary Grace Ventura PA-C    enoxaparin (LOVENOX) subcutaneous injection 40 mg, 40 mg, Subcutaneous, Q24H YOAAN, Mary Grace Ventura PA-C    HYDROmorphone (DILAUDID) injection 0 2 mg, 0 2 mg, Intravenous, Q4H PRN, Mary Grace Ventura PA-C    metoprolol tartrate (LOPRESSOR) tablet 50 mg, 50 mg, Oral, BID, Mary Grace Ventura PA-C    metroNIDAZOLE (FLAGYL) IVPB (premix) 500 mg, 500 mg, Intravenous, Q8H, MaryG race Ventura, JEREMIAH, Last Rate: 200 mL/hr at 11/12/19 0637, 500 mg at 11/12/19 9005    pravastatin (PRAVACHOL) tablet 40 mg, 40 mg, Oral, Daily With Otf Maldonado PA-C           Lab, Imaging and other studies:  CBC:   Lab Results   Component Value Date    WBC 16 43 (H) 11/11/2019    HGB 17 0 11/11/2019    HCT 50 2 (H) 11/11/2019    MCV 85 11/11/2019     11/11/2019    MCH 28 8 11/11/2019    MCHC 33 9 11/11/2019    RDW 12 3 11/11/2019    MPV 10 5 11/11/2019    NRBC 0 11/11/2019   , CMP:   Lab Results   Component Value Date    SODIUM 137 11/11/2019    K 4 1 11/11/2019    CL 98 (L) 11/11/2019    CO2 28 11/11/2019    BUN 14 11/11/2019    CREATININE 1 07 11/11/2019    CALCIUM 9 6 11/11/2019    EGFR 76 11/11/2019         VTE Pharmacologic Prophylaxis: Sequential compression device (Venodyne)  and Enoxaparin (Lovenox)  VTE Mechanical Prophylaxis: sequential compression device    Rounds performed with nursing

## 2019-11-12 NOTE — H&P
History and Physical -General Surgery  Dariel Holley 63 y o  male MRN: [id number]  Unit/Bed#: ED 09 Encounter: [id number]        Reason for Consult / Principal Problem: Diverticulitis with microperforation     HPI: Dariel Holley is a 63y o  year old male who presents with left sided lower abdominal pain  Patient states he woke this morning with left lower abdominal pressure, felt like he was constipated  Throughout the day he tried home enema without relief, pain continued to increase prompting presentation to Urgent Care  Based on his presentation it was suspected he had fecal impaction and was referred to ED  During ED exam he was noted to have LLQ pain and was sent for CT scan which revealed sigmoid diverticulitis with microperforation  Currently notes left lower quadrant pain with associated diarrhea, denies fevers or chills, nausea or vomiting, chest pain or shortness of breath, headache or dizziness  Surgical history significant for hernia repair over 20 years ago  Most recent colonoscopy 2 years ago  Denies past episode of diverticulitis or similar symptoms  Last ate yesterday, has had only clear fluids today  Review of Systems   Constitutional: Positive for appetite change  Negative for activity change, chills and fever  HENT: Negative for ear pain, sinus pressure and sore throat  Eyes: Negative for pain  Respiratory: Negative for chest tightness and shortness of breath  Cardiovascular: Negative for chest pain and palpitations  Gastrointestinal: Positive for abdominal distention, abdominal pain and constipation  Negative for blood in stool, diarrhea, nausea and vomiting  Genitourinary: Negative for flank pain and hematuria  Musculoskeletal: Negative for arthralgias  Skin: Negative for color change and rash  Neurological: Negative for dizziness and headaches         Historical Information   Past Medical History:   Diagnosis Date    Cervical spondylosis without myelopathy 6/28/2019     Past Surgical History:   Procedure Laterality Date    HERNIA REPAIR Left 1997     Social History   Social History     Substance and Sexual Activity   Alcohol Use Yes    Frequency: Monthly or less    Drinks per session: 1 or 2     Social History     Substance and Sexual Activity   Drug Use Never     Social History     Tobacco Use   Smoking Status Never Smoker   Smokeless Tobacco Never Used     Family History   Problem Relation Age of Onset    Diabetes Mother     Hypertension Mother     Coronary artery disease Mother     Hypertension Father     Dementia Father     Alzheimer's disease Father     Colon cancer Father 79    Hyperlipidemia Brother     Allergies Son     Diabetes Maternal Grandmother     Prostate cancer Neg Hx     Testicular cancer Neg Hx        Meds/Allergies       (Not in a hospital admission)  Current Facility-Administered Medications   Medication Dose Route Frequency    ciprofloxacin (CIPRO) IVPB (premix) 400 mg  400 mg Intravenous Once    metroNIDAZOLE (FLAGYL) IVPB (premix) 500 mg  500 mg Intravenous Once       Allergies   Allergen Reactions    Penicillins Swelling     Reaction Date: 28Sep2007;        Objective     Blood pressure 138/71, pulse 82, temperature 99 2 °F (37 3 °C), temperature source Oral, resp  rate 16, SpO2 97 %  No intake or output data in the 24 hours ending 11/11/19 2235    PHYSICAL EXAM  General appearance: alert and oriented, in no acute distress  Skin: Skin color, texture, turgor normal  No rashes or lesions  Head: Normocephalic, without obvious abnormality  Heart: regular rate and rhythm, S1, S2 normal, no murmur, click, rub or gallop  Lungs: clear to auscultation bilaterally  Abdomen: Soft nondistended abdomen  Positive bowel sounds throughout  Mild tenderness LLQ and lower midline abdmen without rigidity or guarding     Neurological: normal without focal findings and mental status, speech normal, alert and oriented x3    Lab Results:   Admission on 11/11/2019   Component Date Value    WBC 11/11/2019 16 43*    RBC 11/11/2019 5 90*    Hemoglobin 11/11/2019 17 0     Hematocrit 11/11/2019 50 2*    MCV 11/11/2019 85     MCH 11/11/2019 28 8     MCHC 11/11/2019 33 9     RDW 11/11/2019 12 3     MPV 11/11/2019 10 5     Platelets 10/51/9198 212     nRBC 11/11/2019 0     Neutrophils Relative 11/11/2019 83*    Immat GRANS % 11/11/2019 1     Lymphocytes Relative 11/11/2019 7*    Monocytes Relative 11/11/2019 9     Eosinophils Relative 11/11/2019 0     Basophils Relative 11/11/2019 0     Neutrophils Absolute 11/11/2019 13 79*    Immature Grans Absolute 11/11/2019 0 08     Lymphocytes Absolute 11/11/2019 1 11     Monocytes Absolute 11/11/2019 1 39*    Eosinophils Absolute 11/11/2019 0 00     Basophils Absolute 11/11/2019 0 06     Sodium 11/11/2019 137     Potassium 11/11/2019 4 1     Chloride 11/11/2019 98*    CO2 11/11/2019 28     ANION GAP 11/11/2019 11     BUN 11/11/2019 14     Creatinine 11/11/2019 1 07     Glucose 11/11/2019 119     Calcium 11/11/2019 9 6     eGFR 11/11/2019 76      Imaging Studies:   CT a/p: Colonic diverticulosis with significant stranding around the sigmoid colon representing acute diverticulitis      Tiny droplets of pneumoperitoneum      Thickening of the anterior portion of the urinary bladder which may represent cystitis      ASSESSMENT/PLAN:  Problem List     Benign essential hypertension    Overview Signed 5/31/2019 10:14 AM by John Elkins DO     Procedure/Onset: 09/28/2007         Motion sickness    Overview Signed 5/31/2019 10:14 AM by John Elkins DO     Procedure/Onset: 09/28/2007         Mixed hyperlipidemia    Seasonal allergies    BMI 33 0-33 9,adult    Carpal tunnel syndrome    Cervical spondylosis without myelopathy    Ulnar nerve abnormality    Injury of tendon of rotator cuff        Diverticulitis with microperforation  62 y/o male with one day history of lower left sided abdominal pain and diarrhea  CT scan showing sigmoid diverticulitis with tiny pockets of pneumoperitoneum  General surgery consulted  - Admit patient to service  - Clear liquid diet  - IV Cipro/Flagyl  - Pain control PRN  - DVT prophylaxis       This patient will require  2 night hospital stay  Counseling / Coordination of Care  Total time spent today  30 minutes  Greater than 50% of total time was spent with the patient and / or family counseling and / or coordination of care

## 2019-11-12 NOTE — ED NOTES
PT ambulatory to room -distress     She Barros [facility]  11/11/19 1948

## 2019-11-13 LAB
ANION GAP SERPL CALCULATED.3IONS-SCNC: 11 MMOL/L (ref 4–13)
BASOPHILS # BLD AUTO: 0.09 THOUSANDS/ΜL (ref 0–0.1)
BASOPHILS NFR BLD AUTO: 1 % (ref 0–1)
BUN SERPL-MCNC: 11 MG/DL (ref 5–25)
CALCIUM SERPL-MCNC: 8.9 MG/DL (ref 8.3–10.1)
CHLORIDE SERPL-SCNC: 101 MMOL/L (ref 100–108)
CO2 SERPL-SCNC: 26 MMOL/L (ref 21–32)
CREAT SERPL-MCNC: 1.03 MG/DL (ref 0.6–1.3)
EOSINOPHIL # BLD AUTO: 0.11 THOUSAND/ΜL (ref 0–0.61)
EOSINOPHIL NFR BLD AUTO: 1 % (ref 0–6)
ERYTHROCYTE [DISTWIDTH] IN BLOOD BY AUTOMATED COUNT: 12.4 % (ref 11.6–15.1)
GFR SERPL CREATININE-BSD FRML MDRD: 80 ML/MIN/1.73SQ M
GLUCOSE SERPL-MCNC: 98 MG/DL (ref 65–140)
HCT VFR BLD AUTO: 46.5 % (ref 36.5–49.3)
HGB BLD-MCNC: 15.4 G/DL (ref 12–17)
IMM GRANULOCYTES # BLD AUTO: 0.04 THOUSAND/UL (ref 0–0.2)
IMM GRANULOCYTES NFR BLD AUTO: 0 % (ref 0–2)
LYMPHOCYTES # BLD AUTO: 1.48 THOUSANDS/ΜL (ref 0.6–4.47)
LYMPHOCYTES NFR BLD AUTO: 16 % (ref 14–44)
MCH RBC QN AUTO: 28.5 PG (ref 26.8–34.3)
MCHC RBC AUTO-ENTMCNC: 33.1 G/DL (ref 31.4–37.4)
MCV RBC AUTO: 86 FL (ref 82–98)
MONOCYTES # BLD AUTO: 0.97 THOUSAND/ΜL (ref 0.17–1.22)
MONOCYTES NFR BLD AUTO: 10 % (ref 4–12)
NEUTROPHILS # BLD AUTO: 6.84 THOUSANDS/ΜL (ref 1.85–7.62)
NEUTS SEG NFR BLD AUTO: 72 % (ref 43–75)
NRBC BLD AUTO-RTO: 0 /100 WBCS
PLATELET # BLD AUTO: 194 THOUSANDS/UL (ref 149–390)
PMV BLD AUTO: 11 FL (ref 8.9–12.7)
POTASSIUM SERPL-SCNC: 3.2 MMOL/L (ref 3.5–5.3)
RBC # BLD AUTO: 5.41 MILLION/UL (ref 3.88–5.62)
SODIUM SERPL-SCNC: 138 MMOL/L (ref 136–145)
WBC # BLD AUTO: 9.53 THOUSAND/UL (ref 4.31–10.16)

## 2019-11-13 PROCEDURE — 85025 COMPLETE CBC W/AUTO DIFF WBC: CPT | Performed by: PHYSICIAN ASSISTANT

## 2019-11-13 PROCEDURE — 80048 BASIC METABOLIC PNL TOTAL CA: CPT | Performed by: PHYSICIAN ASSISTANT

## 2019-11-13 RX ORDER — POTASSIUM CHLORIDE 20 MEQ/1
40 TABLET, EXTENDED RELEASE ORAL ONCE
Status: COMPLETED | OUTPATIENT
Start: 2019-11-13 | End: 2019-11-13

## 2019-11-13 RX ADMIN — ENOXAPARIN SODIUM 40 MG: 40 INJECTION SUBCUTANEOUS at 09:27

## 2019-11-13 RX ADMIN — CIPROFLOXACIN 400 MG: 2 INJECTION, SOLUTION INTRAVENOUS at 22:25

## 2019-11-13 RX ADMIN — METRONIDAZOLE 500 MG: 500 INJECTION, SOLUTION INTRAVENOUS at 23:56

## 2019-11-13 RX ADMIN — METOPROLOL TARTRATE 50 MG: 50 TABLET, FILM COATED ORAL at 09:28

## 2019-11-13 RX ADMIN — CIPROFLOXACIN 400 MG: 2 INJECTION, SOLUTION INTRAVENOUS at 09:27

## 2019-11-13 RX ADMIN — METRONIDAZOLE 500 MG: 500 INJECTION, SOLUTION INTRAVENOUS at 14:25

## 2019-11-13 RX ADMIN — METOPROLOL TARTRATE 50 MG: 50 TABLET, FILM COATED ORAL at 17:58

## 2019-11-13 RX ADMIN — METRONIDAZOLE 500 MG: 500 INJECTION, SOLUTION INTRAVENOUS at 06:07

## 2019-11-13 RX ADMIN — POTASSIUM CHLORIDE 40 MEQ: 1500 TABLET, EXTENDED RELEASE ORAL at 09:28

## 2019-11-13 NOTE — UTILIZATION REVIEW
Continued Stay Review    Date: 11/13/2019                       Current Patient Class: inpatient   Current Level of Care: med surg    HPI:59 y o  male initially admitted on 11/11/2019 inpatient due to diverticulitis with microperforation  Assessment/Plan: Today 11/13/2019 with loose stool, flatus and improved pain  On exam abdomen tender LLQ  Pain control with oral analgesics, continue antibiotics and advance diet to full  K is 3 2 and replete    Pertinent Labs/Diagnostic Results:   11/11/2019 CT abdomen - Colonic diverticulosis with significant stranding around the sigmoid colon representing acute diverticulitis  Tiny droplets of pneumoperitoneum    Thickening of the anterior portion of the urinary bladder which may represent cystitis  Results from last 7 days   Lab Units 11/13/19 0436 11/12/19 0908 11/11/19 2010   WBC Thousand/uL 9 53 13 03* 16 43*   HEMOGLOBIN g/dL 15 4 15 8 17 0   HEMATOCRIT % 46 5 47 4 50 2*   PLATELETS Thousands/uL 194 192 212   NEUTROS ABS Thousands/µL 6 84 10 88* 13 79*     Results from last 7 days   Lab Units 11/13/19 0436 11/12/19 0908 11/11/19 2010   SODIUM mmol/L 138 135* 137   POTASSIUM mmol/L 3 2* 3 2* 4 1   CHLORIDE mmol/L 101 99* 98*   CO2 mmol/L 26 27 28   ANION GAP mmol/L 11 9 11   BUN mg/dL 11 11 14   CREATININE mg/dL 1 03 1 09 1 07   EGFR ml/min/1 73sq m 80 74 76   CALCIUM mg/dL 8 9 8 8 9 6     Results from last 7 days   Lab Units 11/13/19 0436 11/12/19 0908 11/11/19 2010   GLUCOSE RANDOM mg/dL 98 160* 119     Vital Signs:   11/13/19 0825  98 7 °F (37 1 °C)  64  18  134/80  102  97 %  None (Room air)  Lying   11/12/19 2229  100 2 °F (37 9 °C)  67  16  119/75    95 %  None (Room air)  Lying   11/12/19 1500  100 6 °F (38 1 °C)Abnormal   78  16  129/69    97 %  None (Room air)  Lying   11/12/19 0900  98 2 °F (36 8 °C)  73  16  131/75    95 %  None (Room air)  Lying   11/12/19 0017  99 6 °F (37 6 °C)                 11/11/19 2335  101 °F (38 3 °C)Abnormal 82  18  135/77    97 %  None (Room air)         Medications:   Scheduled Medications:    Medications:  ciprofloxacin 400 mg Intravenous Q12H   enoxaparin 40 mg Subcutaneous Q24H YOANA   metoprolol tartrate 50 mg Oral BID   metroNIDAZOLE 500 mg Intravenous Q8H   pravastatin 40 mg Oral Daily With Dinner   potassium chloride (K-DUR,KLOR-CON) CR tablet 40 mEq   Dose: 40 mEq  Freq: Once Route: PO  Start: 11/13/19 0815 End: 11/13/19 0928  PRN Meds: not used   acetaminophen 650 mg Oral Q6H PRN   HYDROmorphone 0 2 mg Intravenous Q4H PRN   HYDROmorphone 0 5 mg Intravenous Q4H PRN   HYDROmorphone 1 mg Intravenous Q4H PRN       Discharge Plan: to be determined  Network Utilization Review Department  [email or web address]  ATTENTION: Please call with any questions or concerns to [phone] and carefully listen to the prompts so that you are directed to the right person  All voicemails are confidential   Krystin Bailon all requests for admission clinical reviews, approved or denied determinations and any other requests to dedicated fax number below belonging to the campus where the patient is receiving treatment    FACILITY NAME UR FAX NUMBER   ADMISSION DENIALS (Administrative/Medical Necessity) [address] [facility] (Maternity/NICU/Pediatrics) [phone]   [facility] [address] [facility] [phone]   [address] [facility] [address] [facility] [phone]   Niyah Jeffries [address] [phone]

## 2019-11-13 NOTE — PROGRESS NOTES
[facility]        NAME: Shahid Berry is a 63 y o  male  : 1960    MRN: [id number]  DATE: 2019  TIME: 5:54 PM    Assessment and Plan   No primary diagnosis found  No diagnosis found  Patient Instructions       Follow up with PCP in 3-5 days  Proceed to  ER if symptoms worsen  Chief Complaint     Chief Complaint   Patient presents with    Fecal Impaction     Pt is constipated today and has tried several remedies however non of them are working and he is now passing  liquid and is very unccomfortable  History of Present Illness       63years old male presents for evaluation fecal impaction  He has tried all the over-the-counter meds that without improving, he has a mild fever, nausea, no vomiting  He reports there is some liquids, but no solids stool  He had a similar problem before, he has to go to the hospital to get it resolved  Review of Systems   Review of Systems   Constitutional: Negative  HENT: Negative  Eyes: Negative  Respiratory: Negative  Cardiovascular: Negative  Gastrointestinal: Positive for abdominal distention, abdominal pain and constipation     Current Medications     No current facility-administered medications for this visit  No current outpatient medications on file      Facility-Administered Medications Ordered in Other Visits:     acetaminophen (TYLENOL) tablet 650 mg, 650 mg, Oral, Q6H PRN, Mary Grace Ventura PA-C    ciprofloxacin (CIPRO) IVPB (premix) 400 mg, 400 mg, Intravenous, Q12H, Mary Grace Ventura PA-C, Last Rate: 200 mL/hr at 19 0927, 400 mg at 19 0927    enoxaparin (LOVENOX) subcutaneous injection 40 mg, 40 mg, Subcutaneous, Q24H [address], Mary Grace Ventura PA-C, 40 mg at 19 2311    HYDROmorphone (DILAUDID) injection 0 2 mg, 0 2 mg, Intravenous, Q4H PRN, Geeta Thomas PA-C    HYDROmorphone (DILAUDID) injection 0 5 mg, 0 5 mg, Intravenous, Q4H PRN, Geeta Thomas PA-C   HYDROmorphone (DILAUDID) injection 1 mg, 1 mg, Intravenous, Q4H PRN, Joya Escobar PA-C    metoprolol tartrate (LOPRESSOR) tablet 50 mg, 50 mg, Oral, BID, Valvickey Smith PA-C, 50 mg at 11/13/19 3745    metroNIDAZOLE (FLAGYL) IVPB (premix) 500 mg, 500 mg, Intravenous, Q8H, Sd Smith PA-C, Last Rate: 200 mL/hr at 11/13/19 1425, 500 mg at 11/13/19 1425    pravastatin (PRAVACHOL) tablet 40 mg, 40 mg, Oral, Daily With Kitty Reeves PA-C    Current Allergies     Allergies as of 11/11/2019 - Reviewed 11/11/2019   Allergen Reaction Noted    Penicillins Swelling 09/28/2007            The following portions of the patient's history were reviewed and updated as appropriate: allergies, current medications, past family history, past medical history, past social history, past surgical history and problem list      Past Medical History:   Diagnosis Date    Cervical spondylosis without myelopathy 6/28/2019       Past Surgical History:   Procedure Laterality Date    HERNIA REPAIR Left 1997       Family History   Problem Relation Age of Onset    Diabetes Mother     Hypertension Mother     Coronary artery disease Mother     Hypertension Father     Dementia Father     Alzheimer's disease Father     Colon cancer Father 79    Hyperlipidemia Brother     Allergies Son     Diabetes Maternal Grandmother     Prostate cancer Neg Hx     Testicular cancer Neg Hx          Medications have been verified  Objective   /86   Pulse 82   Temp (!) 100 9 °F (38 3 °C) (Oral)   Resp 16   Ht 5' 3" (1 6 m)   Wt 86 4 kg (190 lb 6 4 oz)   SpO2 98%   BMI 33 73 kg/m²        Physical Exam     Physical Exam   Nursing note and vitals reviewed  No physical exam done at the visit    He was referred to emergency room in directly

## 2019-11-13 NOTE — PLAN OF CARE
Problem: PAIN - ADULT  Goal: Verbalizes/displays adequate comfort level or baseline comfort level  Description  Interventions:  - Encourage patient to monitor pain and request assistance  - Assess pain using appropriate pain scale  - Administer analgesics based on type and severity of pain and evaluate response  - Implement non-pharmacological measures as appropriate and evaluate response  - Consider cultural and social influences on pain and pain management  - Notify physician/advanced practitioner if interventions unsuccessful or patient reports new pain  Outcome: Progressing     Problem: INFECTION - ADULT  Goal: Absence or prevention of progression during hospitalization  Description  INTERVENTIONS:  - Assess and monitor for signs and symptoms of infection  - Monitor lab/diagnostic results  - Monitor all insertion sites, i e  indwelling lines, tubes, and drains  - Monitor endotracheal if appropriate and nasal secretions for changes in amount and color  - [place] appropriate cooling/warming therapies per order  - Administer medications as ordered  - Instruct and encourage patient and family to use good hand hygiene technique  - Identify and instruct in appropriate isolation precautions for identified infection/condition  Outcome: Progressing  Goal: Absence of fever/infection during neutropenic period  Description  INTERVENTIONS:  - Monitor WBC    Outcome: Progressing     Problem: SAFETY ADULT  Goal: Patient will remain free of falls  Description  INTERVENTIONS:  - Assess patient frequently for physical needs  -  Identify cognitive and physical deficits and behaviors that affect risk of falls    -  [place] fall precautions as indicated by assessment   - Educate patient/family on patient safety including physical limitations  - Instruct patient to call for assistance with activity based on assessment  - Modify environment to reduce risk of injury  - Consider OT/PT consult to assist with strengthening/mobility  Outcome: Progressing  Goal: Maintain or return to baseline ADL function  Description  INTERVENTIONS:  -  Assess patient's ability to carry out ADLs; assess patient's baseline for ADL function and identify physical deficits which impact ability to perform ADLs (bathing, care of mouth/teeth, toileting, grooming, dressing, etc )  - Assess/evaluate cause of self-care deficits   - Assess range of motion  - Assess patient's mobility; develop plan if impaired  - Assess patient's need for assistive devices and provide as appropriate  - Encourage maximum independence but intervene and supervise when necessary  - Involve family in performance of ADLs  - Assess for home care needs following discharge   - Consider OT consult to assist with ADL evaluation and planning for discharge  - Provide patient education as appropriate  Outcome: Progressing  Goal: Maintain or return mobility status to optimal level  Description  INTERVENTIONS:  - Assess patient's baseline mobility status (ambulation, transfers, stairs, etc )    - Identify cognitive and physical deficits and behaviors that affect mobility  - Identify mobility aids required to assist with transfers and/or ambulation (gait belt, sit-to-stand, lift, walker, cane, etc )  - [place] fall precautions as indicated by assessment  - Record patient progress and toleration of activity level on Mobility SBAR; progress patient to next Phase/Stage  - Instruct patient to call for assistance with activity based on assessment  - Consider rehabilitation consult to assist with strengthening/weightbearing, etc   Outcome: Progressing

## 2019-11-13 NOTE — PROGRESS NOTES
Progress Note - General Surgery   Azucena Gottron 63 y o  male MRN: [id number]  Unit/Bed#: S -01 Encounter: [id number]        Subjective/Objective     Subjective:  Continues to feel better  Blood pressure 119/75, pulse 67, temperature 100 2 °F (37 9 °C), temperature source Oral, resp  rate 16, height 5' 3" (1 6 m), weight 84 3 kg (185 lb 13 6 oz), SpO2 95 %  ,Body mass index is 32 92 kg/m²  No intake or output data in the 24 hours ending 11/13/19 0800        Physical Exam:   Round soft with only scant left lower quadrant tenderness    WBC down a 9      Assessment:  Diverticulitis with micro perforation    Plan:  Full liquids, toast, crackers  Continue IV antibiotics      Simone Herron DO  11/13/2019  8:00 AM

## 2019-11-13 NOTE — PROGRESS NOTES
Progress Note -Surgery YIMI Ware 63 y o  male MRN: [id number]  Unit/Bed#: S -97 Encounter: [id number]    ASSESSMENT/PLAN:  Problem List     * (Principal) Diverticulitis    Benign essential hypertension    Mixed hyperlipidemia    BMI 33 0-33 9,adult  Hypokalemia   64 yo M with acute diverticulitis with normal WBC and improved pain  Tolerating clear diet  +flatus and BM  · OOB and ambulate  · PO pain control  · IV abx x1 more day  · Advance diet to full liquids  · Replete K  · Tentative DC in the next day or two  VTE Pharmacologic Prophylaxis: Sequential compression device (Venodyne)  and Enoxaparin (Lovenox)    Subjective/Objective     Subjective: "I feel better"  +loose stool this am  +flatus   Improved pain    Objective/Physical Exam: Blood pressure 119/75, pulse 67, temperature 100 2 °F (37 9 °C), temperature source Oral, resp  rate 16, height 5' 3" (1 6 m), weight 84 3 kg (185 lb 13 6 oz), SpO2 95 %  ,Body mass index is 32 92 kg/m²  General appearance: alert and oriented, in no acute distress  Abdomen: rounded and soft, +BS, slightly tender LLQ  No rebound or guarding         Current Facility-Administered Medications:     acetaminophen (TYLENOL) tablet 650 mg, 650 mg, Oral, Q6H PRN, Jared Suárez PA-C    ciprofloxacin (CIPRO) IVPB (premix) 400 mg, 400 mg, Intravenous, Q12H, Jared Suárez PA-C, Last Rate: 200 mL/hr at 11/12/19 2134, 400 mg at 11/12/19 2134    enoxaparin (LOVENOX) subcutaneous injection 40 mg, 40 mg, Subcutaneous, Q24H [address], Jared Suárez PA-C, 40 mg at 11/12/19 0920    HYDROmorphone (DILAUDID) injection 0 2 mg, 0 2 mg, Intravenous, Q4H PRN, Joya Escobar PA-C    HYDROmorphone (DILAUDID) injection 0 5 mg, 0 5 mg, Intravenous, Q4H PRN, Joya Escobar PA-C    HYDROmorphone (DILAUDID) injection 1 mg, 1 mg, Intravenous, Q4H PRN, Joya Escobar PA-C    metoprolol tartrate (LOPRESSOR) tablet 50 mg, 50 mg, Oral, BID, Jared Suárez PA-C, 50 mg at 11/12/19 1712    metroNIDAZOLE (FLAGYL) IVPB (premix) 500 mg, 500 mg, Intravenous, Q8H, Jacob Malik PA-C, Last Rate: 200 mL/hr at 11/13/19 0607, 500 mg at 11/13/19 1763    pravastatin (PRAVACHOL) tablet 40 mg, 40 mg, Oral, Daily With Lucy Rosado PA-C    Lab, Imaging and other studies:      Ref Range & Units 11/13/19 0436 11/12/19 0908 11/11/19 2010   WBC 4 31 - 10 16 Thousand/uL 9 53  13  03High   16  43High     RBC 3 88 - 5 62 Million/uL 5 41  5 60  5  90High     Hemoglobin 12 0 - 17 0 g/dL 15 4  15 8  17 0    Hematocrit 36 5 - 49 3 % 46 5  47 4  50  2High     MCV 82 - 98 fL 86  85  85    MCH 26 8 - 34 3 pg 28 5  28 2  28 8    MCHC 31 4 - 37 4 g/dL 33 1  33 3  33 9    RDW 11 6 - 15 1 % 12 4  12 6  12 3    MPV 8 9 - 12 7 fL 11 0  10 8  10 5    Platelets 282 - 959 Thousands/uL 194  192  212    nRBC /100 WBCs 0  0  0    Neutrophils Relative 43 - 75 % 72  84High   83High     Immat GRANS % 0 - 2 % 0  0  1    Lymphocytes Relative 14 - 44 % 16  8Low   7Low     Monocytes Relative 4 - 12 % 10  7  9    Eosinophils Relative 0 - 6 % 1  0  0    Basophils Relative 0 - 1 % 1  1  0    Neutrophils Absolute 1 85 - 7 62 Thousands/µL 6 84  10  88High   13  79High     Immature Grans Absolute 0 00 - 0 20 Thousand/uL 0 04  0 05  0 08    Lymphocytes Absolute 0 60 - 4 47 Thousands/µL 1 48  1 06  1 11    Monocytes Absolute 0 17 - 1 22 Thousand/µL 0 97  0 97  1  39High     Eosinophils Absolute 0 00 - 0 61 Thousand/µL 0 11  0 01  0 00    Basophils Absolute 0 00 - 0 10 Thousands/µL 0 09  0 06  0 06               Ref Range & Units 11/13/19 0436 11/12/19 0908 11/11/19 2010 6/5/19 0644   Sodium 136 - 145 mmol/L 138  135Low   137  144 R   Potassium 3 5 - 5 3 mmol/L 3 2Low   3 2Low   4 1 CM 4 4 R   Chloride 100 - 108 mmol/L 101  99Low   98Low   103 R   CO2 21 - 32 mmol/L 26  27  28  23 R   ANION GAP 4 - 13 mmol/L 11  9  11     BUN 5 - 25 mg/dL 11  11  14  21 R   Creatinine 0 60 - 1 30 mg/dL 1 03  1 09 CM 1 07 CM 1 17 R   Comment: Standardized to IDMS reference method   Glucose 65 - 140 mg/dL 98  160High   CM 99 R   Comment:   If the patient is fasting, the ADA then defines impaired fasting glucose as > 100 mg/dL and diabetes as > or equal to 123 mg/dL  Specimen collection should occur prior to Sulfasalazine administration due to the potential for falsely depressed results  Specimen collection should occur prior to Sulfapyridine administration due to the potential for falsely elevated results     Calcium 8 3 - 10 1 mg/dL 8 9  8 8  9 6

## 2019-11-14 ENCOUNTER — TRANSITIONAL CARE MANAGEMENT (OUTPATIENT)
Dept: FAMILY MEDICINE CLINIC | Facility: CLINIC | Age: 59
End: 2019-11-14

## 2019-11-14 VITALS
TEMPERATURE: 98.4 F | HEART RATE: 68 BPM | RESPIRATION RATE: 18 BRPM | SYSTOLIC BLOOD PRESSURE: 131 MMHG | OXYGEN SATURATION: 99 % | DIASTOLIC BLOOD PRESSURE: 75 MMHG | HEIGHT: 63 IN | WEIGHT: 185.85 LBS | BODY MASS INDEX: 32.93 KG/M2

## 2019-11-14 LAB
BASOPHILS # BLD AUTO: 0.08 THOUSANDS/ΜL (ref 0–0.1)
BASOPHILS NFR BLD AUTO: 1 % (ref 0–1)
EOSINOPHIL # BLD AUTO: 0.28 THOUSAND/ΜL (ref 0–0.61)
EOSINOPHIL NFR BLD AUTO: 3 % (ref 0–6)
ERYTHROCYTE [DISTWIDTH] IN BLOOD BY AUTOMATED COUNT: 12.5 % (ref 11.6–15.1)
HCT VFR BLD AUTO: 46.9 % (ref 36.5–49.3)
HGB BLD-MCNC: 15.7 G/DL (ref 12–17)
IMM GRANULOCYTES # BLD AUTO: 0.02 THOUSAND/UL (ref 0–0.2)
IMM GRANULOCYTES NFR BLD AUTO: 0 % (ref 0–2)
LYMPHOCYTES # BLD AUTO: 1.6 THOUSANDS/ΜL (ref 0.6–4.47)
LYMPHOCYTES NFR BLD AUTO: 20 % (ref 14–44)
MCH RBC QN AUTO: 28.8 PG (ref 26.8–34.3)
MCHC RBC AUTO-ENTMCNC: 33.5 G/DL (ref 31.4–37.4)
MCV RBC AUTO: 86 FL (ref 82–98)
MONOCYTES # BLD AUTO: 0.97 THOUSAND/ΜL (ref 0.17–1.22)
MONOCYTES NFR BLD AUTO: 12 % (ref 4–12)
NEUTROPHILS # BLD AUTO: 5.23 THOUSANDS/ΜL (ref 1.85–7.62)
NEUTS SEG NFR BLD AUTO: 64 % (ref 43–75)
NRBC BLD AUTO-RTO: 0 /100 WBCS
PLATELET # BLD AUTO: 193 THOUSANDS/UL (ref 149–390)
PMV BLD AUTO: 10.1 FL (ref 8.9–12.7)
RBC # BLD AUTO: 5.45 MILLION/UL (ref 3.88–5.62)
WBC # BLD AUTO: 8.18 THOUSAND/UL (ref 4.31–10.16)

## 2019-11-14 PROCEDURE — 85025 COMPLETE CBC W/AUTO DIFF WBC: CPT | Performed by: PHYSICIAN ASSISTANT

## 2019-11-14 RX ORDER — METRONIDAZOLE 250 MG/1
250 TABLET ORAL EVERY 8 HOURS SCHEDULED
Qty: 21 TABLET | Refills: 0 | Status: SHIPPED | OUTPATIENT
Start: 2019-11-14 | End: 2019-11-22

## 2019-11-14 RX ORDER — CIPROFLOXACIN 500 MG/1
500 TABLET, FILM COATED ORAL EVERY 12 HOURS SCHEDULED
Qty: 14 TABLET | Refills: 0 | Status: SHIPPED | OUTPATIENT
Start: 2019-11-14 | End: 2019-11-22

## 2019-11-14 RX ORDER — ACETAMINOPHEN 325 MG/1
650 TABLET ORAL EVERY 6 HOURS PRN
Qty: 30 TABLET | Refills: 0 | Status: SHIPPED | OUTPATIENT
Start: 2019-11-14 | End: 2019-11-22

## 2019-11-14 RX ADMIN — METOPROLOL TARTRATE 50 MG: 50 TABLET, FILM COATED ORAL at 09:05

## 2019-11-14 RX ADMIN — METRONIDAZOLE 500 MG: 500 INJECTION, SOLUTION INTRAVENOUS at 07:19

## 2019-11-14 RX ADMIN — ENOXAPARIN SODIUM 40 MG: 40 INJECTION SUBCUTANEOUS at 09:05

## 2019-11-14 NOTE — DISCHARGE SUMMARY
Discharge Summary - Kayley Chiang 63 y o  male MRN: [id number]    Unit/Bed#: S -38 Encounter: [id number]    Admission Date: 11/11/2019   Discharge Date: 11/14/19    Admitting Diagnosis:   Constipated [K59 00]  Diverticulitis of large intestine with perforation without bleeding [K57 20]    Discharge Diagnoses: Principal Problem:    Diverticulitis    Consultations: none    Procedures Performed: none    Hospital Course: Kayley Chiang is a 63 y o  male with history of previous hernia repair presented to the the ED for evaluation left sided abdominal pain and admitted for acute diverticulitis  He was admitted for IV antibiotics, IV hydration, pain control and bowel rest   His symptoms improved and a low residue diet was added  Today he has no pain and is cleared for discharge on PO antibiotics  He will follow up in the office in 2 weeks  Condition at Discharge: good     Discharge instructions/Information to patient and family:   See after visit summary for information provided to patient and family  Provisions for Follow-Up Care:  See after visit summary for information related to follow-up care and any pertinent home health orders  Disposition: Home    Planned Readmission: No    Discharge Statement   I spent 20 minutes discharging the patient  This time was spent on the day of discharge  I had direct contact with the patient on the day of discharge  Additional documentation is required if more than 30 minutes were spent on discharge  Discharge Medications:  See after visit summary for reconciled discharge medications provided to patient and family

## 2019-11-14 NOTE — DISCHARGE INSTRUCTIONS
Diverticulitis   WHAT YOU NEED TO KNOW:   Diverticulitis is a condition that causes small pockets along your intestine called diverticula to become inflamed or infected  This is caused by hard bowel movements, food, or bacteria that get stuck in the pockets  DISCHARGE INSTRUCTIONS:   Return to the emergency department if:   · You have bowel movement or foul-smelling discharge leaking from your vagina or in your urine  · You have severe diarrhea  · You urinate less than usual or not at all  · You are not able to have a bowel movement  · You cannot stop vomiting  · You have severe abdominal pain, a fever, and your abdomen is larger than usual      · You have new or increased blood in your bowel movements  Contact your healthcare provider if:   · You have pain when you urinate  · Your symptoms get worse or do not go away  · You have questions or concerns about your condition or care  Medicines:   · Antibiotics  may be given to help treat a bacterial infection  · Prescription pain medicine  may be given  Ask your healthcare provider how to take this medicine safely  Some prescription pain medicines contain acetaminophen  Do not take other medicines that contain acetaminophen without talking to your healthcare provider  Too much acetaminophen may cause liver damage  Prescription pain medicine may cause constipation  Ask your healthcare provider how to prevent or treat constipation  · Take your medicine as directed  Contact your healthcare provider if you think your medicine is not helping or if you have side effects  Tell him or her if you are allergic to any medicine  Keep a list of the medicines, vitamins, and herbs you take  Include the amounts, and when and why you take them  Bring the list or the pill bottles to follow-up visits  Carry your medicine list with you in case of an emergency    Clear liquid diet:  A clear liquid diet includes any liquids that you can see through  Examples include water, ginger-roxie, cranberry or apple juice, frozen fruit ice, or broth  Stay on a clear liquid diet until your symptoms are gone, or as directed  Follow up with your healthcare provider as directed: You may need to return for a colonoscopy  When your symptoms are gone, you may need a low-fat, high-fiber diet to prevent diverticulitis from developing again  Your healthcare provider or dietitian can help you create meal plans  Write down your questions so you remember to ask them during your visits  © 2017 [facility] Information is for End User's use only and may not be sold, redistributed or otherwise used for commercial purposes  All illustrations and images included in CareNotes® are the copyrighted property of A D A M , Inc  or [employer]  The above information is an  only  It is not intended as medical advice for individual conditions or treatments  Talk to your doctor, nurse or pharmacist before following any medical regimen to see if it is safe and effective for you  Diverticulitis Diet   WHAT YOU NEED TO KNOW:   A diverticulitis diet includes foods that allow your intestines to rest while you have diverticulitis  Diverticulitis is a condition that causes small pockets along your intestine called diverticula to become inflamed or infected  This is caused by hard bowel movement, food, or bacteria that get stuck in the pockets  DISCHARGE INSTRUCTIONS:   Foods you can eat while you have diverticulitis:   · A clear liquid diet may be recommended for 2 to 3 days  A clear liquid diet is made up of clear liquids and foods that are liquid at room temperature  Your healthcare provider will tell you when you can start eating solid foods   Examples of clear liquids include the following:     ¨ Water and clear juices (such as apple, cranberry, or grape), strained citrus juices or fruit punch    ¨ Coffee or tea (without cream or milk)    ¨ Clear sports drinks or soft drinks, such as ginger ale, lemon-lime soda, or club soda (no cola or root beer)    ¨ Clear broth, bouillon, or consommé    ¨ Plain popsicles (no popsicles with pureed fruit or fiber)    ¨ Flavored gelatin without fruit    · A low-fiber diet may be recommended until your symptoms improve  Your healthcare provider will tell you when you can slowly add high-fiber foods back into your diet  ¨ Cream of wheat and finely ground grits    ¨ White bread, white pasta, and white rice    ¨ Canned and well-cooked fruit without skins or seeds, and juice without pulp    ¨ Canned and well-cooked vegetables without skins or seeds, and vegetable juice    ¨ Cow's milk, lactose-free milk, soy milk, and rice milk    ¨ Yogurt, cottage cheese, and sherbet    ¨ Eggs, poultry (such as chicken and turkey), fish, and tender, ground, well-cooked beef     ¨ Tofu and smooth nut butters, such as peanut butter    ¨ Broth and strained soups made of low-fiber foods  Foods you should avoid while you have diverticulitis:  Avoid foods that are high in fiber while you have symptoms of diverticulitis  Examples of high-fiber foods include the following:  · Whole grains and breads, and cereals made with whole grains    · Dried fruit, fresh fruit with skin, and fruit pulp    · Raw vegetables    · Cooked greens, such as spinach    · Tough meat and meat with gristle    · Legumes, such as reyes beans and lentils  Contact your healthcare provider if:   · Your symptoms do not get better, or they get worse  · You have questions about the foods you should eat  · You have questions or concerns about your condition or care  © 2017 [address] [employer] Information is for End User's use only and may not be sold, redistributed or otherwise used for commercial purposes  All illustrations and images included in CareNotes® are the copyrighted property of A D A M [employer] Inc  or [employer]    The above information is an  only  It is not intended as medical advice for individual conditions or treatments  Talk to your doctor, nurse or pharmacist before following any medical regimen to see if it is safe and effective for you

## 2019-11-14 NOTE — PLAN OF CARE
Problem: PAIN - ADULT  Goal: Verbalizes/displays adequate comfort level or baseline comfort level  Description  Interventions:  - Encourage patient to monitor pain and request assistance  - Assess pain using appropriate pain scale  - Administer analgesics based on type and severity of pain and evaluate response  - Implement non-pharmacological measures as appropriate and evaluate response  - Consider cultural and social influences on pain and pain management  - Notify physician/advanced practitioner if interventions unsuccessful or patient reports new pain  Outcome: Progressing     Problem: INFECTION - ADULT  Goal: Absence or prevention of progression during hospitalization  Description  INTERVENTIONS:  - Assess and monitor for signs and symptoms of infection  - Monitor lab/diagnostic results  - Monitor all insertion sites, i e  indwelling lines, tubes, and drains  - Monitor endotracheal if appropriate and nasal secretions for changes in amount and color  - Lesage appropriate cooling/warming therapies per order  - Administer medications as ordered  - Instruct and encourage patient and family to use good hand hygiene technique  - Identify and instruct in appropriate isolation precautions for identified infection/condition  Outcome: Progressing  Goal: Absence of fever/infection during neutropenic period  Description  INTERVENTIONS:  - Monitor WBC    Outcome: Progressing     Problem: SAFETY ADULT  Goal: Patient will remain free of falls  Description  INTERVENTIONS:  - Assess patient frequently for physical needs  -  Identify cognitive and physical deficits and behaviors that affect risk of falls    -  Lesage fall precautions as indicated by assessment   - Educate patient/family on patient safety including physical limitations  - Instruct patient to call for assistance with activity based on assessment  - Modify environment to reduce risk of injury  - Consider OT/PT consult to assist with strengthening/mobility  Outcome: Progressing  Goal: Maintain or return to baseline ADL function  Description  INTERVENTIONS:  -  Assess patient's ability to carry out ADLs; assess patient's baseline for ADL function and identify physical deficits which impact ability to perform ADLs (bathing, care of mouth/teeth, toileting, grooming, dressing, etc )  - Assess/evaluate cause of self-care deficits   - Assess range of motion  - Assess patient's mobility; develop plan if impaired  - Assess patient's need for assistive devices and provide as appropriate  - Encourage maximum independence but intervene and supervise when necessary  - Involve family in performance of ADLs  - Assess for home care needs following discharge   - Consider OT consult to assist with ADL evaluation and planning for discharge  - Provide patient education as appropriate  Outcome: Progressing  Goal: Maintain or return mobility status to optimal level  Description  INTERVENTIONS:  - Assess patient's baseline mobility status (ambulation, transfers, stairs, etc )    - Identify cognitive and physical deficits and behaviors that affect mobility  - Identify mobility aids required to assist with transfers and/or ambulation (gait belt, sit-to-stand, lift, walker, cane, etc )  - Antler fall precautions as indicated by assessment  - Record patient progress and toleration of activity level on Mobility SBAR; progress patient to next Phase/Stage  - Instruct patient to call for assistance with activity based on assessment  - Consider rehabilitation consult to assist with strengthening/weightbearing, etc   Outcome: Progressing

## 2019-11-14 NOTE — PROGRESS NOTES
Progress Note -Surgery PA  Sabrina Duke 63 y o  male MRN: [id number]  Unit/Bed#: S -05 Encounter: [id number]    ASSESSMENT/PLAN:  Problem List     * (Principal) Diverticulitis    Benign essential hypertension    Mixed hyperlipidemia    BMI 33 0-33 9,adult   64 yo M with improving diverticulitis  Denies pain  · OOB and ambulate  · IS   · PO pain control  · DC home today on PO abx  · Low residue diet for DC      VTE Pharmacologic Prophylaxis: Sequential compression device (Venodyne)  and Enoxaparin (Lovenox)    Subjective/Objective     Subjective:   No complaints  No pain   Tolerating diet    Objective/Physical Exam: Blood pressure 129/75, pulse 70, temperature 98 8 °F (37 1 °C), temperature source Oral, resp  rate 18, height 5' 3" (1 6 m), weight 84 3 kg (185 lb 13 6 oz), SpO2 97 %  ,Body mass index is 32 92 kg/m²      General appearance: alert and oriented, in no acute distress  Heart: regular rate and rhythm, S1, S2 normal, no murmur, click, rub or gallop  Lungs: clear to auscultation bilaterally  Abdomen: soft, non-tender; bowel sounds normal; no masses,  no organomegaly    Current Facility-Administered Medications:     acetaminophen (TYLENOL) tablet 650 mg, 650 mg, Oral, Q6H PRN, Equgreyson Ogden, PA-C    ciprofloxacin (CIPRO) IVPB (premix) 400 mg, 400 mg, Intravenous, Q12H, Equilla Melvi, PA-C, Last Rate: 200 mL/hr at 11/13/19 2225, 400 mg at 11/13/19 2225    enoxaparin (LOVENOX) subcutaneous injection 40 mg, 40 mg, Subcutaneous, Q24H [address], Equilla Mabele, PA-C, 40 mg at 11/13/19 5718    HYDROmorphone (DILAUDID) injection 0 2 mg, 0 2 mg, Intravenous, Q4H PRN, Joyadavid Escobar PA-C    HYDROmorphone (DILAUDID) injection 0 5 mg, 0 5 mg, Intravenous, Q4H PRN, Joyadavid Escobar PA-C    HYDROmorphone (DILAUDID) injection 1 mg, 1 mg, Intravenous, Q4H PRN, Joya Escobar PA-C    metoprolol tartrate (LOPRESSOR) tablet 50 mg, 50 mg, Oral, BID, Stacy Ogden PA-C, 50 mg at 11/13/19 7216   metroNIDAZOLE (FLAGYL) IVPB (premix) 500 mg, 500 mg, Intravenous, Q8H, Jared Suárez PA-C, Last Rate: 200 mL/hr at 11/14/19 0719, 500 mg at 11/14/19 0719    pravastatin (PRAVACHOL) tablet 40 mg, 40 mg, Oral, Daily With Radames Juárez PA-C       Lab, Imaging and other studies:   WBC 11/14/2019 8 18  4 31 - 10 16 Thousand/uL Final    RBC 11/14/2019 5 45  3 88 - 5 62 Million/uL Final    Hemoglobin 11/14/2019 15 7  12 0 - 17 0 g/dL Final    Hematocrit 11/14/2019 46 9  36 5 - 49 3 % Final    MCV 11/14/2019 86  82 - 98 fL Final    MCH 11/14/2019 28 8  26 8 - 34 3 pg Final    MCHC 11/14/2019 33 5  31 4 - 37 4 g/dL Final    RDW 11/14/2019 12 5  11 6 - 15 1 % Final    MPV 11/14/2019 10 1  8 9 - 12 7 fL Final    Platelets 87/73/6735 193  149 - 390 Thousands/uL Final    nRBC 11/14/2019 0  /100 WBCs Final    Neutrophils Relative 11/14/2019 64  43 - 75 % Final    Immat GRANS % 11/14/2019 0  0 - 2 % Final    Lymphocytes Relative 11/14/2019 20  14 - 44 % Final    Monocytes Relative 11/14/2019 12  4 - 12 % Final    Eosinophils Relative 11/14/2019 3  0 - 6 % Final    Basophils Relative 11/14/2019 1  0 - 1 % Final    Neutrophils Absolute 11/14/2019 5 23  1 85 - 7 62 Thousands/µL Final    Immature Grans Absolute 11/14/2019 0 02  0 00 - 0 20 Thousand/uL Final    Lymphocytes Absolute 11/14/2019 1 60  0 60 - 4 47 Thousands/µL Final    Monocytes Absolute 11/14/2019 0 97  0 17 - 1 22 Thousand/µL Final    Eosinophils Absolute 11/14/2019 0 28  0 00 - 0 61 Thousand/µL Final    Basophils Absolute 11/14/2019 0 08  0 00 - 0 10 Thousands/µL Final

## 2019-11-15 NOTE — UTILIZATION REVIEW
Clem Gonzales RN   Registered Nurse   Utilization Review   Utilization Review   Signed   Date of Service:  11/13/2019 12:29 PM               Signed             Show:Clear all  [x]Manual[x]Template[x]Copied    Added by:  Kera Hart RN    []Kevanver for details  Continued Stay Review     Date: 11/13/2019                        Current Patient Class: inpatient                   Current Level of Care: med surg     HPI:59 y o  male initially admitted on 11/11/2019 inpatient due to diverticulitis with microperforation       Assessment/Plan: Today 11/13/2019 with loose stool, flatus and improved pain  On exam abdomen tender LLQ  Pain control with oral analgesics, continue antibiotics and advance diet to full  K is 3 2 and replete     Pertinent Labs/Diagnostic Results:   11/11/2019 CT abdomen - Colonic diverticulosis with significant stranding around the sigmoid colon representing acute diverticulitis  Tiny droplets of pneumoperitoneum    Thickening of the anterior portion of the urinary bladder which may represent cystitis         Results from last 7 days   Lab Units 11/13/19 0436 11/12/19 0908 11/11/19 2010   WBC Thousand/uL 9 53 13 03* 16 43*   HEMOGLOBIN g/dL 15 4 15 8 17 0   HEMATOCRIT % 46 5 47 4 50 2*   PLATELETS Thousands/uL 194 192 212   NEUTROS ABS Thousands/µL 6 84 10 88* 13 79*             Results from last 7 days   Lab Units 11/13/19 0436 11/12/19 0908 11/11/19 2010   SODIUM mmol/L 138 135* 137   POTASSIUM mmol/L 3 2* 3 2* 4 1   CHLORIDE mmol/L 101 99* 98*   CO2 mmol/L 26 27 28   ANION GAP mmol/L 11 9 11   BUN mg/dL 11 11 14   CREATININE mg/dL 1 03 1 09 1 07   EGFR ml/min/1 73sq m 80 74 76   CALCIUM mg/dL 8 9 8 8 9 6             Results from last 7 days   Lab Units 11/13/19 0436 11/12/19 0908 11/11/19 2010   GLUCOSE RANDOM mg/dL 98 160* 119      Vital Signs:   11/13/19 0825   98 7 °F (37 1 °C)   64   18   134/80   102   97 %   None (Room air)   Lying   11/12/19 2229   100 2 °F (37 9 °C)   67   16   119/75      95 %   None (Room air)   Lying   11/12/19 1500   100 6 °F (38 1 °C)Abnormal    78   16   129/69      97 %   None (Room air)   Lying   11/12/19 0900   98 2 °F (36 8 °C)   73   16   131/75      95 %   None (Room air)   Lying   11/12/19 0017   99 6 °F (37 6 °C)                        11/11/19 2335   101 °F (38 3 °C)Abnormal    82   18   135/77      97 %   None (Room air)             Medications:   Scheduled Medications:     Medications:  ciprofloxacin 400 mg Intravenous Q12H   enoxaparin 40 mg Subcutaneous Q24H YOANA   metoprolol tartrate 50 mg Oral BID   metroNIDAZOLE 500 mg Intravenous Q8H   pravastatin 40 mg Oral Daily With Dinner   potassium chloride (K-DUR,KLOR-CON) CR tablet 40 mEq   Dose: 40 mEq  Freq: Once Route: PO  Start: 11/13/19 0815 End: 11/13/19 0928  PRN Meds: not used   acetaminophen 650 mg Oral Q6H PRN   HYDROmorphone 0 2 mg Intravenous Q4H PRN   HYDROmorphone 0 5 mg Intravenous Q4H PRN   HYDROmorphone 1 mg Intravenous Q4H PRN         Discharge Plan: to be determined       Network Utilization Review Department  [email or web address]  ATTENTION: Please call with any questions or concerns to [phone] and carefully listen to the prompts so that you are directed to the right person  All voicemails are confidential   Erendira France all requests for admission clinical reviews, approved or denied determinations and any other requests to dedicated fax number below belonging to the campus where the patient is receiving treatment    FACILITY NAME UR FAX NUMBER   ADMISSION DENIALS (Administrative/Medical Necessity) [phone]   PARENT [facility]ILD [facility] (Maternity/NICU/Pediatrics) [phone]   [facility] [phone]   Keeley Feliciano [phone]   [address] [phone]   Car Josue [address] [phone]

## 2019-11-15 NOTE — UTILIZATION REVIEW
Notification of Discharge  This is a Notification of Discharge from our facility [address]  Please be advised that this patient has been discharge from our facility  Below you will find the admission and discharge date and time including the patients disposition  PRESENTATION DATE: 11/11/2019  7:44 PM  OBS ADMISSION DATE:   IP ADMISSION DATE: 11/11/19 2249   DISCHARGE DATE: 11/14/2019 11:21 AM  DISPOSITION: Home/Self Care Home/Self Care   Admission Orders listed below:  Admission Orders (From admission, onward)     Ordered        11/11/19 2249  Inpatient Admission (expected length of stay for this patient Order details is greater than two midnights)  Once                   Please contact the UR Department if additional information is required to close this patient's authorization/case  [address] [facility] Util[facility] Department  Main: [phone] x carefully listen to the prompts  All voicemails are confidential   [email or web address]  Send all requests for admission clinical reviews, approved or denied determinations and any other requests to dedicated fax number below belonging to the campus where the patient is receiving treatment    List of dedicated fax numbers:  [address] DENIALS (Administrative/Medical Necessity) [phone]   [address] (Maternity/NICU/Pediatrics) [phone]   Nimisha Cordoba [phone]   Juliann Hinkle [phone]   Vignesh Colon [phone]   Cassandra Shaw [address] [phone]   [facility] [address] [place] [address] [phone]

## 2019-11-22 ENCOUNTER — OFFICE VISIT (OUTPATIENT)
Dept: FAMILY MEDICINE CLINIC | Facility: CLINIC | Age: 59
End: 2019-11-22
Payer: COMMERCIAL

## 2019-11-22 VITALS
HEIGHT: 63 IN | BODY MASS INDEX: 32.25 KG/M2 | HEART RATE: 63 BPM | RESPIRATION RATE: 16 BRPM | WEIGHT: 182 LBS | OXYGEN SATURATION: 98 % | DIASTOLIC BLOOD PRESSURE: 70 MMHG | SYSTOLIC BLOOD PRESSURE: 130 MMHG

## 2019-11-22 DIAGNOSIS — K57.30 DIVERTICULOSIS OF COLON: ICD-10-CM

## 2019-11-22 DIAGNOSIS — IMO0001 TRANSITION OF CARE PERFORMED WITH SHARING OF CLINICAL SUMMARY: Primary | ICD-10-CM

## 2019-11-22 DIAGNOSIS — I10 BENIGN ESSENTIAL HYPERTENSION: ICD-10-CM

## 2019-11-22 DIAGNOSIS — E78.2 MIXED HYPERLIPIDEMIA: ICD-10-CM

## 2019-11-22 DIAGNOSIS — I80.8 THROMBOPHLEBITIS OF ARM, RIGHT: ICD-10-CM

## 2019-11-22 DIAGNOSIS — K57.92 DIVERTICULITIS: ICD-10-CM

## 2019-11-22 PROCEDURE — 1111F DSCHRG MED/CURRENT MED MERGE: CPT | Performed by: FAMILY MEDICINE

## 2019-11-22 PROCEDURE — 99495 TRANSJ CARE MGMT MOD F2F 14D: CPT | Performed by: FAMILY MEDICINE

## 2019-11-22 RX ORDER — BIOTIN 5 MG
1000 TABLET ORAL 2 TIMES DAILY
COMMUNITY

## 2019-11-22 NOTE — PROGRESS NOTES
Assessment/Plan:   Diagnoses and all orders for this visit:    Transition of care performed with sharing of clinical summary  Diverticulitis  Diverticulosis of colon  Thrombophlebitis of arm, right  - PMHx of diverticulosis noted on Cscope   - reviewed hospitalization records  - pt completed PO abx yesterday   - doing well, pain controlled, back on regular diet   - warm compress on site of thrombophlebitis   - has a f/u scheduled with Surgeon on 12/2/19   - discussed diet and exercise    Benign essential hypertension  - BP well controlled in the office today   - currently on BB on 50mg BID - cont current therapy   - did see Optho 1 5months ago - will send for consult note  - discussed diet and exercise  - (+) FHx of HTN in both parents   - not a smoker     Mixed hyperlipidemia  -     Lipid panel; Future  - Lipids (6/5/2019): , , HDL 39,   - current 10yr ASCVD risk of 12 8%   - (+) FHx of HL in Brother   - tried Crestor 10mg QD and Tricor 145mg QD - with cognitive impairment and pt self d/c'd and on Fish Oil  - given his elevated 10yr ASCVD risk, pt was started on Crestor 5mg Q48 on 10/25 which he self d/c'd  - currently taking Krill Oil 500mg BID - advised to increase to 1000mg BID and to add red yeast rice   - will repeat Lipid Panel   - RTO in 2/2020 for his routine f/u - pt aware and agreeable     BMI 32 0-32 9,adult  - discussed diet and encouraged exercise  - educated that it takes 3500 abelardo to lose 1lb  - advised to cut down on carbs, 5 servings of fruits/veggies/day, increase water intake (65-80oz/water/day)   - appropriate weight loss goal for men = 1-2lb/week  - per the Heart Association - 150mins/exercise/week, but to lose and maintain weight 200-300mins/exercise/week     Other orders  -     Krill Oil 500 MG CAPS;  Take 500 mg by mouth 2 (two) times a day      The 10-year ASCVD risk score (Willis Peralta et al , 2013) is: 12 8%    Values used to calculate the score:      Age: 63 years Sex: Male      Is Non- : No      Diabetic: No      Tobacco smoker: No      Systolic Blood Pressure: 953 mmHg      Is BP treated: Yes      HDL Cholesterol: 39 mg/dL      Total Cholesterol: 243 mg/dL    TCM Call (since 10/22/2019)     Date and time call was made  11/14/2019 11:46 AM    Hospital care reviewed  Records reviewed    Patient was hospitialized at  [address] [place] [address]    Date of Admission  11/11/19    Date of discharge  11/14/19    Diagnosis  Diverticulitis     Disposition  Home    Were the patients medications reviewed and updated  Yes    Current Symptoms  None      TCM Call (since 10/22/2019)     Post hospital issues  None    Should patient be enrolled in anticoag monitoring? No    Scheduled for follow up? Yes    Patients specialists  Other (comment)    Other specialists names  [facility] ([facility])    Did you obtain your prescribed medications  Yes    Do you need help managing your prescriptions or medications  No    Is transportation to your appointment needed  No    I have advised the patient to call PCP with any new or worsening symptoms  MUNDO CANALES MA    Living Arrangements  Spouse or Significiant other    Support System  Family    Do you have social support  Yes, as much as I need    Are you recieving any outpatient services  No    Are you recieving home care services  No    Are you using any community resources  No    Current waiver services  No    Have you fallen in the last 12 months  No    Interperter language line needed  No          Subjective:    Patient ID: Armando Homans is a 63 y o  male    64yo M presents to the office for TCM   - was admitted to [facility] from 11/11 to 11/14 for acute diverticulitis of large colon with microperforation   - does have a PMHx of diverticulosis noted on prior Cscope by Dr Saskia Perry  - was treated in-house with IV abx, pain control and bowel rest - diet was slowly advanced as tolerated  - he was d/c'd on 11/14 on Flagyl and Cipro - completed abx yesterday   - has f/u with Surgeon on 12/2  - of note, self d/c'd his statin, takes Krill Oil 500mg BID   - today in the office pt denies F/C/N/V/CP/palpitations/SOB/wheezing/abd pain/D/C/LE edema  - (+) thrombophlebitis at site of IV - none tender to palpation     The following portions of the patient's history were reviewed and updated as appropriate: allergies, current medications, past family history, past medical history, past social history, past surgical history and problem list     Review of Systems  as per HPI    Objective:  /70   Pulse 63   Resp 16   Ht 5' 3" (1 6 m)   Wt 82 6 kg (182 lb)   SpO2 98%   BMI 32 24 kg/m²    Physical Exam   Constitutional: He is oriented to person, place, and time  He appears well-developed and well-nourished  No distress  HENT:   Head: Normocephalic and atraumatic  Right Ear: External ear normal    Left Ear: External ear normal    Nose: Nose normal    Eyes: Conjunctivae and EOM are normal  Right eye exhibits no discharge  Left eye exhibits no discharge  No scleral icterus  Neck: Normal range of motion  Neck supple  Cardiovascular: Normal rate, regular rhythm and normal heart sounds  Exam reveals no gallop and no friction rub  No murmur heard  Pulmonary/Chest: Effort normal and breath sounds normal  No stridor  No respiratory distress  He has no wheezes  He has no rales  Abdominal: Soft  Bowel sounds are normal  He exhibits no distension and no mass  There is no tenderness  There is no rebound and no guarding  BMI 32 2   Musculoskeletal: Normal range of motion  He exhibits no edema, tenderness or deformity  Neurological: He is alert and oriented to person, place, and time  Skin: Skin is warm  He is not diaphoretic  Thrombophlebitis in R-UE at site of IV insertion - not erythematous, not tender to palpation    Psychiatric: He has a normal mood and affect  His behavior is normal    Vitals reviewed  BMI Counseling:  Body mass index is 32 24 kg/m²  The BMI is above normal  Nutrition recommendations include decreasing overall calorie intake and consuming healthier snacks  Exercise recommendations include moderate aerobic physical activity for 150 minutes/week

## 2019-12-18 DIAGNOSIS — I10 BENIGN ESSENTIAL HYPERTENSION: ICD-10-CM

## 2019-12-18 RX ORDER — METOPROLOL TARTRATE 50 MG/1
TABLET, FILM COATED ORAL
Qty: 180 TABLET | Refills: 0 | Status: SHIPPED | OUTPATIENT
Start: 2019-12-18 | End: 2020-02-14 | Stop reason: SDUPTHER

## 2020-02-14 ENCOUNTER — OFFICE VISIT (OUTPATIENT)
Dept: FAMILY MEDICINE CLINIC | Facility: CLINIC | Age: 60
End: 2020-02-14
Payer: COMMERCIAL

## 2020-02-14 VITALS
WEIGHT: 188 LBS | HEART RATE: 72 BPM | SYSTOLIC BLOOD PRESSURE: 136 MMHG | OXYGEN SATURATION: 99 % | BODY MASS INDEX: 33.31 KG/M2 | DIASTOLIC BLOOD PRESSURE: 80 MMHG | RESPIRATION RATE: 16 BRPM | HEIGHT: 63 IN

## 2020-02-14 DIAGNOSIS — E78.2 MIXED HYPERLIPIDEMIA: Primary | ICD-10-CM

## 2020-02-14 DIAGNOSIS — I10 BENIGN ESSENTIAL HYPERTENSION: ICD-10-CM

## 2020-02-14 DIAGNOSIS — Z13.0 SCREENING FOR DEFICIENCY ANEMIA: ICD-10-CM

## 2020-02-14 DIAGNOSIS — J30.2 SEASONAL ALLERGIES: ICD-10-CM

## 2020-02-14 DIAGNOSIS — Z12.5 PROSTATE CANCER SCREENING: ICD-10-CM

## 2020-02-14 PROCEDURE — 3008F BODY MASS INDEX DOCD: CPT | Performed by: FAMILY MEDICINE

## 2020-02-14 PROCEDURE — 3075F SYST BP GE 130 - 139MM HG: CPT | Performed by: FAMILY MEDICINE

## 2020-02-14 PROCEDURE — 1036F TOBACCO NON-USER: CPT | Performed by: FAMILY MEDICINE

## 2020-02-14 PROCEDURE — 99213 OFFICE O/P EST LOW 20 MIN: CPT | Performed by: FAMILY MEDICINE

## 2020-02-14 PROCEDURE — 3079F DIAST BP 80-89 MM HG: CPT | Performed by: FAMILY MEDICINE

## 2020-02-14 RX ORDER — METOPROLOL TARTRATE 50 MG/1
50 TABLET, FILM COATED ORAL 2 TIMES DAILY
Qty: 180 TABLET | Refills: 0 | Status: SHIPPED | OUTPATIENT
Start: 2020-02-14 | End: 2020-04-22

## 2020-02-14 RX ORDER — BLOOD-GLUCOSE METER
EACH MISCELLANEOUS
COMMUNITY

## 2020-02-14 NOTE — PROGRESS NOTES
Assessment/Plan:   Diagnoses and all orders for this visit:    Mixed hyperlipidemia  -     Lipid panel; Future  - Lipids (6/5/2019): , , HDL 39,   - current 10yr ASCVD risk of 13 8%   - (+) FHx of HL in Brother   - tried Crestor 10mg QD and Tricor 145mg QD - with cognitive impairment and pt self d/c'd and on Fish Oil  - given his elevated 10yr ASCVD risk, pt was started on Crestor 5mg Q48 on 10/25 which he self d/c'd  - currently taking Krill Oil 1000mg BID and red yeast rice   - discussed starting pt on Zetia to lower cholesterol and decrease his overall 10yr ASCVD risk - pt declined for now   - will repeat Lipid Panel and RTO in 3months for annual exam - pt aware and agreeable     Benign essential hypertension  -     Comprehensive metabolic panel; Future  -     Microalbumin / creatinine urine ratio  -     metoprolol tartrate (LOPRESSOR) 50 mg tablet; Take 1 tablet (50 mg total) by mouth 2 (two) times a day  - BP in the office well controlled x2  - cont BB 50mg BID     BMI 33 0-33 9,adult  - discussed diet and encouraged exercise  - educated that it takes 3500 abelardo to lose 1lb  - advised to cut down on carbs, 5 servings of fruits/veggies/day, increase water intake (65-80oz/water/day)   - appropriate weight loss goal for men = 1-2lb/week  - per the Heart Association - 150mins/exercise/week, but to lose and maintain weight 200-300mins/exercise/week     Seasonal allergies  - advised to take Mucinex QHS for PND     Prostate cancer screening  -     PSA Total, Diagnostic; Future    Screening for deficiency anemia  -     CBC and differential; Future    Other orders  -     Red Yeast Rice 500 MG/0 5GM POWD; Take by mouth          Subjective:    Patient ID: Myesha Lehman is a 63 y o  male    64yo M presents to the office for f/u   1) HTN  - BP in the office 130/70 and on my repeat 136/80  - currently on BB on 50mg BID and tolerating it well   - does have a BP cuff and checks his pressures at home - no log with him today - but states "pressures have been good"   - went to Optho - Dr Sabrina Garza - in 12/2019 - per pt, "everything fine" - goes annually   - today in the office pt denies F/C/N/V/HA/change in vision/CP/palpitations/SOB/wheezing/abd pain/LE edema  - "I do need to get moving again" - BMI in the office 33 3   - diet: "not a lot of beef, eats chicken and rice"   - (+) FHx of HTN in both parents   - not a smoker   2) HL  - Lipids (6/5/2019): , , HDL 39,   - current 10yr ASCVD risk of 13 8%   - (+) FHx of HL in Brother   - tried Crestor 10mg QD and Tricor 145mg QD - with cognitive impairment and pt self d/c'd and on Fish Oil  - given his elevated 10yr ASCVD risk, pt was started on Crestor 5mg Q48 on 10/25/2019 which he self d/c'd  - currently taking Krill Oil 1000mg BID and red yeast rice 2 BID and tolerating well     The 10-year ASCVD risk score (Dariel Rivas et al , 2013) is: 13 8%    Values used to calculate the score:      Age: 63 years      Sex: Male      Is Non- : No      Diabetic: No      Tobacco smoker: No      Systolic Blood Pressure: 210 mmHg      Is BP treated: Yes      HDL Cholesterol: 39 mg/dL      Total Cholesterol: 243 mg/dL        The following portions of the patient's history were reviewed and updated as appropriate: allergies, current medications, past family history, past medical history, past social history, past surgical history and problem list     Review of Systems  as per HPI    Objective:  /80 (BP Location: Right arm, Patient Position: Sitting, Cuff Size: Standard)   Pulse 72   Resp 16   Ht 5' 3" (1 6 m)   Wt 85 3 kg (188 lb)   SpO2 99%   BMI 33 30 kg/m²    Physical Exam   Constitutional: He is oriented to person, place, and time  He appears well-developed and well-nourished  No distress  HENT:   Head: Normocephalic and atraumatic     Right Ear: External ear normal    Left Ear: External ear normal    Nose: Nose normal    Eyes: Conjunctivae and EOM are normal  Right eye exhibits no discharge  Left eye exhibits no discharge  No scleral icterus  Neck: Normal range of motion  Neck supple  Cardiovascular: Normal rate, regular rhythm and normal heart sounds  Exam reveals no gallop and no friction rub  No murmur heard  Pulmonary/Chest: Effort normal and breath sounds normal  No stridor  No respiratory distress  He has no wheezes  He has no rales  Abdominal: Soft  Bowel sounds are normal    BMI 33 3   Musculoskeletal: Normal range of motion  He exhibits no edema, tenderness or deformity  Neurological: He is alert and oriented to person, place, and time  Skin: Skin is warm  He is not diaphoretic  Psychiatric: He has a normal mood and affect  His behavior is normal    Vitals reviewed  BMI Counseling: Body mass index is 33 3 kg/m²  The BMI is above normal  Nutrition recommendations include decreasing overall calorie intake and consuming healthier snacks  Exercise recommendations include moderate aerobic physical activity for 150 minutes/week and exercising 3-5 times per week

## 2020-03-18 ENCOUNTER — TELEPHONE (OUTPATIENT)
Dept: FAMILY MEDICINE CLINIC | Facility: CLINIC | Age: 60
End: 2020-03-18

## 2020-03-18 DIAGNOSIS — R09.82 POST-NASAL DRIP: Primary | ICD-10-CM

## 2020-03-18 DIAGNOSIS — J30.9 ALLERGIC RHINITIS, UNSPECIFIED SEASONALITY, UNSPECIFIED TRIGGER: ICD-10-CM

## 2020-03-18 RX ORDER — OXYMETAZOLINE HYDROCHLORIDE 0.05 G/100ML
2 SPRAY NASAL 2 TIMES DAILY
Qty: 14.7 ML | Refills: 0 | Status: SHIPPED | OUTPATIENT
Start: 2020-03-18

## 2020-03-18 NOTE — TELEPHONE ENCOUNTER
Spoke with pt - PND - advised Claritin, Mucinex BID, Afin nasal spray (for 3days), cool mist humidifier in the room and to sleep propped on pillows

## 2020-03-18 NOTE — TELEPHONE ENCOUNTER
Pt called and stated he has bad post nasal drip and he is unable to sleep  He wants Dr Stella Rubalcava to send in something for that  I offered an appointment and he said he doesn't need one  Please call back

## 2020-04-21 DIAGNOSIS — I10 BENIGN ESSENTIAL HYPERTENSION: ICD-10-CM

## 2020-04-22 RX ORDER — METOPROLOL TARTRATE 50 MG/1
TABLET, FILM COATED ORAL
Qty: 180 TABLET | Refills: 0 | Status: SHIPPED | OUTPATIENT
Start: 2020-04-22 | End: 2020-08-19

## 2020-08-16 DIAGNOSIS — I10 BENIGN ESSENTIAL HYPERTENSION: ICD-10-CM

## 2020-08-19 RX ORDER — METOPROLOL TARTRATE 50 MG/1
TABLET, FILM COATED ORAL
Qty: 90 TABLET | Refills: 0 | Status: SHIPPED | OUTPATIENT
Start: 2020-08-19 | End: 2020-09-16

## 2020-09-08 DIAGNOSIS — I10 BENIGN ESSENTIAL HYPERTENSION: ICD-10-CM

## 2020-09-16 RX ORDER — METOPROLOL TARTRATE 50 MG/1
TABLET, FILM COATED ORAL
Qty: 180 TABLET | Refills: 0 | Status: SHIPPED | OUTPATIENT
Start: 2020-09-16 | End: 2020-10-21 | Stop reason: ALTCHOICE

## 2020-10-02 LAB
ALBUMIN SERPL-MCNC: 4.6 G/DL (ref 3.8–4.9)
ALBUMIN/CREAT UR: 4 MG/G CREAT (ref 0–29)
ALBUMIN/GLOB SERPL: 2.2 {RATIO} (ref 1.2–2.2)
ALP SERPL-CCNC: 60 IU/L (ref 39–117)
ALT SERPL-CCNC: 31 IU/L (ref 0–44)
AST SERPL-CCNC: 25 IU/L (ref 0–40)
BASOPHILS # BLD AUTO: 0.1 X10E3/UL (ref 0–0.2)
BASOPHILS NFR BLD AUTO: 2 %
BILIRUB SERPL-MCNC: 0.6 MG/DL (ref 0–1.2)
BUN SERPL-MCNC: 16 MG/DL (ref 6–24)
BUN/CREAT SERPL: 14 (ref 9–20)
CALCIUM SERPL-MCNC: 9.6 MG/DL (ref 8.7–10.2)
CHLORIDE SERPL-SCNC: 102 MMOL/L (ref 96–106)
CHOLEST SERPL-MCNC: 215 MG/DL (ref 100–199)
CO2 SERPL-SCNC: 26 MMOL/L (ref 20–29)
CREAT SERPL-MCNC: 1.17 MG/DL (ref 0.76–1.27)
CREAT UR-MCNC: 406.3 MG/DL
EOSINOPHIL # BLD AUTO: 0.1 X10E3/UL (ref 0–0.4)
EOSINOPHIL NFR BLD AUTO: 3 %
ERYTHROCYTE [DISTWIDTH] IN BLOOD BY AUTOMATED COUNT: 13.5 % (ref 11.6–15.4)
GLOBULIN SER-MCNC: 2.1 G/DL (ref 1.5–4.5)
GLUCOSE SERPL-MCNC: 106 MG/DL (ref 65–99)
HCT VFR BLD AUTO: 50 % (ref 37.5–51)
HDLC SERPL-MCNC: 44 MG/DL
HGB BLD-MCNC: 16.6 G/DL (ref 13–17.7)
IMM GRANULOCYTES # BLD: 0 X10E3/UL (ref 0–0.1)
IMM GRANULOCYTES NFR BLD: 0 %
LDLC SERPL CALC-MCNC: 141 MG/DL (ref 0–99)
LYMPHOCYTES # BLD AUTO: 2.3 X10E3/UL (ref 0.7–3.1)
LYMPHOCYTES NFR BLD AUTO: 43 %
MCH RBC QN AUTO: 28.9 PG (ref 26.6–33)
MCHC RBC AUTO-ENTMCNC: 33.2 G/DL (ref 31.5–35.7)
MCV RBC AUTO: 87 FL (ref 79–97)
MICROALBUMIN UR-MCNC: 15 UG/ML
MONOCYTES # BLD AUTO: 0.5 X10E3/UL (ref 0.1–0.9)
MONOCYTES NFR BLD AUTO: 9 %
NEUTROPHILS # BLD AUTO: 2.2 X10E3/UL (ref 1.4–7)
NEUTROPHILS NFR BLD AUTO: 43 %
PLATELET # BLD AUTO: 228 X10E3/UL (ref 150–450)
POTASSIUM SERPL-SCNC: 4.4 MMOL/L (ref 3.5–5.2)
PROT SERPL-MCNC: 6.7 G/DL (ref 6–8.5)
PSA SERPL-MCNC: 4.5 NG/ML (ref 0–4)
RBC # BLD AUTO: 5.74 X10E6/UL (ref 4.14–5.8)
SL AMB EGFR AFRICAN AMERICAN: 78 ML/MIN/1.73
SL AMB EGFR NON AFRICAN AMERICAN: 68 ML/MIN/1.73
SL AMB VLDL CHOLESTEROL CALC: 30 MG/DL (ref 5–40)
SODIUM SERPL-SCNC: 143 MMOL/L (ref 134–144)
TRIGL SERPL-MCNC: 168 MG/DL (ref 0–149)
WBC # BLD AUTO: 5.2 X10E3/UL (ref 3.4–10.8)

## 2020-10-21 ENCOUNTER — OFFICE VISIT (OUTPATIENT)
Dept: FAMILY MEDICINE CLINIC | Facility: CLINIC | Age: 60
End: 2020-10-21
Payer: COMMERCIAL

## 2020-10-21 VITALS
OXYGEN SATURATION: 98 % | WEIGHT: 195 LBS | SYSTOLIC BLOOD PRESSURE: 136 MMHG | TEMPERATURE: 97.9 F | DIASTOLIC BLOOD PRESSURE: 90 MMHG | HEIGHT: 63 IN | BODY MASS INDEX: 34.55 KG/M2 | RESPIRATION RATE: 16 BRPM | HEART RATE: 60 BPM

## 2020-10-21 DIAGNOSIS — R73.9 ELEVATED BLOOD SUGAR: ICD-10-CM

## 2020-10-21 DIAGNOSIS — Z23 NEED FOR INFLUENZA VACCINATION: ICD-10-CM

## 2020-10-21 DIAGNOSIS — G47.9 DIFFICULTY SLEEPING: ICD-10-CM

## 2020-10-21 DIAGNOSIS — E78.2 MIXED HYPERLIPIDEMIA: ICD-10-CM

## 2020-10-21 DIAGNOSIS — R97.20 ELEVATED PSA: ICD-10-CM

## 2020-10-21 DIAGNOSIS — I10 BENIGN ESSENTIAL HYPERTENSION: ICD-10-CM

## 2020-10-21 DIAGNOSIS — Z00.00 ANNUAL PHYSICAL EXAM: Primary | ICD-10-CM

## 2020-10-21 PROCEDURE — 90682 RIV4 VACC RECOMBINANT DNA IM: CPT | Performed by: FAMILY MEDICINE

## 2020-10-21 PROCEDURE — 90471 IMMUNIZATION ADMIN: CPT | Performed by: FAMILY MEDICINE

## 2020-10-21 PROCEDURE — 1036F TOBACCO NON-USER: CPT | Performed by: FAMILY MEDICINE

## 2020-10-21 PROCEDURE — 99396 PREV VISIT EST AGE 40-64: CPT | Performed by: FAMILY MEDICINE

## 2020-10-21 PROCEDURE — 3725F SCREEN DEPRESSION PERFORMED: CPT | Performed by: FAMILY MEDICINE

## 2020-10-21 PROCEDURE — 99214 OFFICE O/P EST MOD 30 MIN: CPT | Performed by: FAMILY MEDICINE

## 2020-10-21 PROCEDURE — 3080F DIAST BP >= 90 MM HG: CPT | Performed by: FAMILY MEDICINE

## 2020-10-21 RX ORDER — METOPROLOL TARTRATE 50 MG/1
50 TABLET, FILM COATED ORAL 2 TIMES DAILY
Qty: 180 TABLET | Refills: 0 | Status: SHIPPED | OUTPATIENT
Start: 2020-10-21 | End: 2020-12-16

## 2020-10-21 RX ORDER — METOPROLOL TARTRATE 50 MG/1
50 TABLET, FILM COATED ORAL 2 TIMES DAILY
Qty: 60 TABLET | Refills: 0 | Status: SHIPPED | OUTPATIENT
Start: 2020-10-21 | End: 2020-10-21 | Stop reason: SDUPTHER

## 2020-10-23 ENCOUNTER — TELEPHONE (OUTPATIENT)
Dept: UROLOGY | Facility: MEDICAL CENTER | Age: 60
End: 2020-10-23

## 2020-10-30 ENCOUNTER — TELEPHONE (OUTPATIENT)
Dept: UROLOGY | Facility: CLINIC | Age: 60
End: 2020-10-30

## 2020-10-30 ENCOUNTER — CONSULT (OUTPATIENT)
Dept: UROLOGY | Facility: CLINIC | Age: 60
End: 2020-10-30
Payer: COMMERCIAL

## 2020-10-30 VITALS
HEART RATE: 65 BPM | SYSTOLIC BLOOD PRESSURE: 140 MMHG | TEMPERATURE: 97.9 F | HEIGHT: 63 IN | BODY MASS INDEX: 34.73 KG/M2 | DIASTOLIC BLOOD PRESSURE: 80 MMHG | WEIGHT: 196 LBS

## 2020-10-30 DIAGNOSIS — R31.29 OTHER MICROSCOPIC HEMATURIA: ICD-10-CM

## 2020-10-30 DIAGNOSIS — R97.20 ELEVATED PSA: Primary | ICD-10-CM

## 2020-10-30 LAB
BACTERIA UR QL AUTO: NORMAL /HPF
BILIRUB UR QL STRIP: NEGATIVE
CLARITY UR: CLEAR
COLOR UR: NORMAL
GLUCOSE UR STRIP-MCNC: NEGATIVE MG/DL
HGB UR QL STRIP.AUTO: NEGATIVE
HYALINE CASTS #/AREA URNS LPF: NORMAL /LPF
KETONES UR STRIP-MCNC: NEGATIVE MG/DL
LEUKOCYTE ESTERASE UR QL STRIP: NEGATIVE
NITRITE UR QL STRIP: NEGATIVE
NON-SQ EPI CELLS URNS QL MICRO: NORMAL /HPF
PH UR STRIP.AUTO: 5.5 [PH]
PROT UR STRIP-MCNC: NEGATIVE MG/DL
RBC #/AREA URNS AUTO: NORMAL /HPF
SL AMB  POCT GLUCOSE, UA: NORMAL
SL AMB LEUKOCYTE ESTERASE,UA: NORMAL
SL AMB POCT BILIRUBIN,UA: NORMAL
SL AMB POCT BLOOD,UA: NORMAL
SL AMB POCT CLARITY,UA: CLEAR
SL AMB POCT COLOR,UA: YELLOW
SL AMB POCT KETONES,UA: NORMAL
SL AMB POCT NITRITE,UA: NORMAL
SL AMB POCT PH,UA: 5
SL AMB POCT SPECIFIC GRAVITY,UA: 1.03
SL AMB POCT URINE PROTEIN: NORMAL
SL AMB POCT UROBILINOGEN: NORMAL
SP GR UR STRIP.AUTO: 1.03 (ref 1–1.03)
UROBILINOGEN UR QL STRIP.AUTO: 0.2 E.U./DL
WBC #/AREA URNS AUTO: NORMAL /HPF

## 2020-10-30 PROCEDURE — 81001 URINALYSIS AUTO W/SCOPE: CPT | Performed by: PHYSICIAN ASSISTANT

## 2020-10-30 PROCEDURE — 3079F DIAST BP 80-89 MM HG: CPT | Performed by: PHYSICIAN ASSISTANT

## 2020-10-30 PROCEDURE — 81002 URINALYSIS NONAUTO W/O SCOPE: CPT | Performed by: PHYSICIAN ASSISTANT

## 2020-10-30 PROCEDURE — 3077F SYST BP >= 140 MM HG: CPT | Performed by: PHYSICIAN ASSISTANT

## 2020-10-30 PROCEDURE — 99203 OFFICE O/P NEW LOW 30 MIN: CPT | Performed by: PHYSICIAN ASSISTANT

## 2020-10-30 PROCEDURE — 3008F BODY MASS INDEX DOCD: CPT | Performed by: PHYSICIAN ASSISTANT

## 2020-12-11 LAB — PSA SERPL-MCNC: 2.7 NG/ML (ref 0–4)

## 2020-12-14 ENCOUNTER — TELEPHONE (OUTPATIENT)
Dept: UROLOGY | Facility: CLINIC | Age: 60
End: 2020-12-14

## 2020-12-14 DIAGNOSIS — I10 BENIGN ESSENTIAL HYPERTENSION: ICD-10-CM

## 2020-12-14 NOTE — TELEPHONE ENCOUNTER
Called and informed patient of the good news, his PSA decreased to 2 7  Plan per Mahi DANIELS is for a follow up in 6 months with another PSA prior  Patient amenable to this  He is asking for a call back after the holidays to schedule

## 2020-12-14 NOTE — TELEPHONE ENCOUNTER
----- Message from Misha Louis PA-C sent at 12/14/2020  9:43 AM EST -----  Please let the patient know the excellent news that his PSA has decreased down to 2 7  We will plan to follow up in 6 months with a repeat diagnostic PSA prior to ensure stability  Please contact the patient and help coordinate  Thank you

## 2020-12-16 RX ORDER — METOPROLOL TARTRATE 50 MG/1
50 TABLET, FILM COATED ORAL 2 TIMES DAILY
Qty: 180 TABLET | Refills: 1 | Status: SHIPPED | OUTPATIENT
Start: 2020-12-16 | End: 2021-07-21

## 2021-04-19 LAB
BUN SERPL-MCNC: 15 MG/DL (ref 6–24)
BUN/CREAT SERPL: 13 (ref 9–20)
CALCIUM SERPL-MCNC: 9.8 MG/DL (ref 8.7–10.2)
CHLORIDE SERPL-SCNC: 103 MMOL/L (ref 96–106)
CHOLEST SERPL-MCNC: 187 MG/DL (ref 100–199)
CO2 SERPL-SCNC: 27 MMOL/L (ref 20–29)
CREAT SERPL-MCNC: 1.16 MG/DL (ref 0.76–1.27)
GLUCOSE SERPL-MCNC: 101 MG/DL (ref 65–99)
HDLC SERPL-MCNC: 39 MG/DL
LDLC SERPL CALC-MCNC: 118 MG/DL (ref 0–99)
POTASSIUM SERPL-SCNC: 4.3 MMOL/L (ref 3.5–5.2)
SL AMB EGFR AFRICAN AMERICAN: 79 ML/MIN/1.73
SL AMB EGFR NON AFRICAN AMERICAN: 69 ML/MIN/1.73
SL AMB VLDL CHOLESTEROL CALC: 30 MG/DL (ref 5–40)
SODIUM SERPL-SCNC: 142 MMOL/L (ref 134–144)
TRIGL SERPL-MCNC: 169 MG/DL (ref 0–149)

## 2021-04-30 ENCOUNTER — OFFICE VISIT (OUTPATIENT)
Dept: FAMILY MEDICINE CLINIC | Facility: CLINIC | Age: 61
End: 2021-04-30
Payer: COMMERCIAL

## 2021-04-30 VITALS
OXYGEN SATURATION: 98 % | SYSTOLIC BLOOD PRESSURE: 126 MMHG | HEART RATE: 58 BPM | BODY MASS INDEX: 34.2 KG/M2 | HEIGHT: 63 IN | DIASTOLIC BLOOD PRESSURE: 84 MMHG | RESPIRATION RATE: 16 BRPM | WEIGHT: 193 LBS

## 2021-04-30 DIAGNOSIS — Z11.59 NEED FOR HEPATITIS C SCREENING TEST: ICD-10-CM

## 2021-04-30 DIAGNOSIS — E78.2 MIXED HYPERLIPIDEMIA: ICD-10-CM

## 2021-04-30 DIAGNOSIS — I10 BENIGN ESSENTIAL HYPERTENSION: Primary | ICD-10-CM

## 2021-04-30 DIAGNOSIS — R97.20 ELEVATED PSA: ICD-10-CM

## 2021-04-30 PROCEDURE — 1036F TOBACCO NON-USER: CPT | Performed by: FAMILY MEDICINE

## 2021-04-30 PROCEDURE — 3074F SYST BP LT 130 MM HG: CPT | Performed by: FAMILY MEDICINE

## 2021-04-30 PROCEDURE — 3079F DIAST BP 80-89 MM HG: CPT | Performed by: FAMILY MEDICINE

## 2021-04-30 PROCEDURE — 3008F BODY MASS INDEX DOCD: CPT | Performed by: FAMILY MEDICINE

## 2021-04-30 PROCEDURE — 3725F SCREEN DEPRESSION PERFORMED: CPT | Performed by: FAMILY MEDICINE

## 2021-04-30 PROCEDURE — 99214 OFFICE O/P EST MOD 30 MIN: CPT | Performed by: FAMILY MEDICINE

## 2021-07-06 LAB
PSA FREE MFR SERPL: 32.8 %
PSA FREE SERPL-MCNC: 1.18 NG/ML
PSA SERPL-MCNC: 3.6 NG/ML (ref 0–4)

## 2021-07-09 ENCOUNTER — OFFICE VISIT (OUTPATIENT)
Dept: UROLOGY | Facility: CLINIC | Age: 61
End: 2021-07-09
Payer: COMMERCIAL

## 2021-07-09 VITALS
HEIGHT: 63 IN | BODY MASS INDEX: 34.02 KG/M2 | WEIGHT: 192 LBS | SYSTOLIC BLOOD PRESSURE: 138 MMHG | DIASTOLIC BLOOD PRESSURE: 86 MMHG | HEART RATE: 55 BPM

## 2021-07-09 DIAGNOSIS — R97.20 ELEVATED PSA: Primary | ICD-10-CM

## 2021-07-09 PROCEDURE — 99213 OFFICE O/P EST LOW 20 MIN: CPT | Performed by: PHYSICIAN ASSISTANT

## 2021-07-09 PROCEDURE — 1036F TOBACCO NON-USER: CPT | Performed by: PHYSICIAN ASSISTANT

## 2021-07-09 PROCEDURE — 3008F BODY MASS INDEX DOCD: CPT | Performed by: PHYSICIAN ASSISTANT

## 2021-07-09 PROCEDURE — 3079F DIAST BP 80-89 MM HG: CPT | Performed by: PHYSICIAN ASSISTANT

## 2021-07-09 PROCEDURE — 3075F SYST BP GE 130 - 139MM HG: CPT | Performed by: PHYSICIAN ASSISTANT

## 2021-07-09 NOTE — PROGRESS NOTES
1  Elevated PSA  PSA Total, Diagnostic         Assessment and plan:       1  Prostate cancer screening  -  Normal digital rectal examination in the office today  -  PSA continues to fluctuate  We discussed continued close observation with a repeat PSA in 6 months for continued monitoring  Patient verbalized understanding  All questions answered  Marisa Donohue PA-C      Chief Complaint     New patient elevated PSA    History of Present Illness     Hayder Winters is a 62 y o  male presenting for follow up of elevated PSA  Patient was undergoing routine prostate cancer screening with his primary care provider  His PSA was previously 3 0 (06/05/2019), 4 5 (10/01/2020), 2 7 (12/10/2020), and now 3 6 (7/2/2021)  Patient overall comfortable with LUTS  Not on any medications for his prostate or bladder  Denies any dysuria, gross hematuria, suprapubic pressure, flank pain, nausea, vomiting, fevers, or chills  Denies any history of urinary infections,  surgical manipulation, nor family history of prostate cancer  Medical comorbidities include hypertension, carpal tunnel, diverticulosis  Laboratory     Lab Results   Component Value Date    CREATININE 1 16 04/16/2021       Lab Results   Component Value Date    PSA 3 6 07/02/2021    PSA 2 7 12/10/2020    PSA 4 5 (H) 10/01/2020       Review of Systems     Review of Systems   Constitutional: Negative for activity change, appetite change, chills, diaphoresis, fatigue, fever and unexpected weight change  Respiratory: Negative for chest tightness and shortness of breath  Cardiovascular: Negative for chest pain, palpitations and leg swelling  Gastrointestinal: Negative for abdominal distention, abdominal pain, constipation, diarrhea, nausea and vomiting  Genitourinary: Negative for decreased urine volume, difficulty urinating, dysuria, enuresis, flank pain, frequency, genital sores, hematuria and urgency     Musculoskeletal: Negative for back pain, gait problem and myalgias  Skin: Negative for color change, pallor, rash and wound  Psychiatric/Behavioral: Negative for behavioral problems  The patient is not nervous/anxious  Allergies     Allergies   Allergen Reactions    Penicillins Swelling     Reaction Date: 28Sep2007;     Statins Confusion    Tricor [Fenofibrate] Confusion       Physical Exam     Physical Exam  Constitutional:       General: He is not in acute distress  Appearance: Normal appearance  He is not ill-appearing  HENT:      Head: Normocephalic and atraumatic  Eyes:      General: No scleral icterus  Right eye: No discharge  Left eye: No discharge  Conjunctiva/sclera: Conjunctivae normal    Pulmonary:      Effort: Pulmonary effort is normal  No respiratory distress  Genitourinary:     Comments: Good rectal tone  Prostate 30g,  Smooth, symmetric, no palpable nodules  Skin:     General: Skin is warm  Coloration: Skin is not jaundiced  Neurological:      General: No focal deficit present  Mental Status: He is alert and oriented to person, place, and time     Psychiatric:         Mood and Affect: Mood normal          Behavior: Behavior normal          Vital Signs     Vitals:    07/09/21 0910   BP: 138/86   Pulse: 55   Weight: 87 1 kg (192 lb)   Height: 5' 3" (1 6 m)         Current Medications       Current Outpatient Medications:     B Complex Vitamins (VITAMIN B-COMPLEX PO), Take by mouth, Disp: , Rfl:     CINNAMON PO, Take by mouth, Disp: , Rfl:     Glucosamine-Chondroit-Vit C-Mn (GLUCOSAMINE 1500 COMPLEX PO), Take by mouth, Disp: , Rfl:     Krill Oil 1000 MG CAPS, Take 1,000 mg by mouth 2 (two) times a day , Disp: , Rfl:     loratadine (CLARITIN) 10 mg tablet, Take 10 mg by mouth daily, Disp: , Rfl:     LYSINE PO, Take by mouth, Disp: , Rfl:     metoprolol tartrate (LOPRESSOR) 50 mg tablet, Take 1 tablet (50 mg total) by mouth 2 (two) times a day, Disp: 180 tablet, Rfl: 1    Red Yeast Rice 500 MG/0 5GM POWD, Take by mouth, Disp: , Rfl:     VITAMIN E PO, Take by mouth, Disp: , Rfl:     oxymetazoline (AFRIN) 0 05 % nasal spray, 2 sprays by Each Nare route 2 (two) times a day For no more than 3days at a time (Patient not taking: Reported on 7/9/2021), Disp: 14 7 mL, Rfl: 0      Active Problems     Patient Active Problem List   Diagnosis    Benign essential hypertension    Motion sickness    Mixed hyperlipidemia    Seasonal allergies    BMI 32 0-32 9,adult    Carpal tunnel syndrome    Cervical spondylosis without myelopathy    Ulnar nerve abnormality    Injury of tendon of rotator cuff    Diverticulitis         Past Medical History     Past Medical History:   Diagnosis Date    Allergic     Cervical spondylosis without myelopathy 6/28/2019    Diverticulitis of colon     Hyperlipidemia     Hypertension     Obesity          Surgical History     Past Surgical History:   Procedure Laterality Date    HERNIA REPAIR Left 1997         Family History     Family History   Problem Relation Age of Onset    Diabetes Mother     Hypertension Mother     Coronary artery disease Mother     Hypertension Father     Dementia Father     Alzheimer's disease Father     Colon cancer Father 79    Hyperlipidemia Brother     Allergies Son     Diabetes Maternal Grandmother     Prostate cancer Neg Hx     Testicular cancer Neg Hx          Social History     Social History       Radiology

## 2021-10-13 ENCOUNTER — TELEPHONE (OUTPATIENT)
Dept: GASTROENTEROLOGY | Facility: CLINIC | Age: 61
End: 2021-10-13

## 2021-11-12 ENCOUNTER — OFFICE VISIT (OUTPATIENT)
Dept: FAMILY MEDICINE CLINIC | Facility: CLINIC | Age: 61
End: 2021-11-12
Payer: COMMERCIAL

## 2021-11-12 VITALS
WEIGHT: 193 LBS | HEART RATE: 80 BPM | HEIGHT: 63 IN | RESPIRATION RATE: 16 BRPM | SYSTOLIC BLOOD PRESSURE: 140 MMHG | BODY MASS INDEX: 34.2 KG/M2 | DIASTOLIC BLOOD PRESSURE: 90 MMHG | OXYGEN SATURATION: 97 %

## 2021-11-12 DIAGNOSIS — R73.9 ELEVATED BLOOD SUGAR: ICD-10-CM

## 2021-11-12 DIAGNOSIS — R97.20 ELEVATED PSA: ICD-10-CM

## 2021-11-12 DIAGNOSIS — Z23 NEED FOR INFLUENZA VACCINATION: ICD-10-CM

## 2021-11-12 DIAGNOSIS — Z00.00 ANNUAL PHYSICAL EXAM: Primary | ICD-10-CM

## 2021-11-12 DIAGNOSIS — E78.2 MIXED HYPERLIPIDEMIA: ICD-10-CM

## 2021-11-12 DIAGNOSIS — I10 BENIGN ESSENTIAL HYPERTENSION: ICD-10-CM

## 2021-11-12 LAB — SL AMB POCT HEMOGLOBIN AIC: 5.4 (ref ?–6.5)

## 2021-11-12 PROCEDURE — 90471 IMMUNIZATION ADMIN: CPT | Performed by: FAMILY MEDICINE

## 2021-11-12 PROCEDURE — 99214 OFFICE O/P EST MOD 30 MIN: CPT | Performed by: FAMILY MEDICINE

## 2021-11-12 PROCEDURE — 3725F SCREEN DEPRESSION PERFORMED: CPT | Performed by: FAMILY MEDICINE

## 2021-11-12 PROCEDURE — 99396 PREV VISIT EST AGE 40-64: CPT | Performed by: FAMILY MEDICINE

## 2021-11-12 PROCEDURE — 83036 HEMOGLOBIN GLYCOSYLATED A1C: CPT | Performed by: FAMILY MEDICINE

## 2021-11-12 PROCEDURE — 90682 RIV4 VACC RECOMBINANT DNA IM: CPT | Performed by: FAMILY MEDICINE

## 2021-11-16 LAB
ALBUMIN SERPL-MCNC: 4.6 G/DL (ref 3.8–4.9)
ALBUMIN/CREAT UR: 4 MG/G CREAT (ref 0–29)
ALBUMIN/GLOB SERPL: 2.1 {RATIO} (ref 1.2–2.2)
ALP SERPL-CCNC: 58 IU/L (ref 44–121)
ALT SERPL-CCNC: 28 IU/L (ref 0–44)
AST SERPL-CCNC: 24 IU/L (ref 0–40)
BILIRUB SERPL-MCNC: 0.6 MG/DL (ref 0–1.2)
BUN SERPL-MCNC: 16 MG/DL (ref 8–27)
BUN/CREAT SERPL: 15 (ref 10–24)
CALCIUM SERPL-MCNC: 9.6 MG/DL (ref 8.6–10.2)
CHLORIDE SERPL-SCNC: 102 MMOL/L (ref 96–106)
CHOLEST SERPL-MCNC: 208 MG/DL (ref 100–199)
CO2 SERPL-SCNC: 28 MMOL/L (ref 20–29)
CREAT SERPL-MCNC: 1.1 MG/DL (ref 0.76–1.27)
CREAT UR-MCNC: 249.7 MG/DL
GLOBULIN SER-MCNC: 2.2 G/DL (ref 1.5–4.5)
GLUCOSE SERPL-MCNC: 108 MG/DL (ref 65–99)
HCV AB S/CO SERPL IA: <0.1 S/CO RATIO (ref 0–0.9)
HDLC SERPL-MCNC: 42 MG/DL
LDLC SERPL CALC-MCNC: 132 MG/DL (ref 0–99)
MICROALBUMIN UR-MCNC: 10.4 UG/ML
POTASSIUM SERPL-SCNC: 4.7 MMOL/L (ref 3.5–5.2)
PROT SERPL-MCNC: 6.8 G/DL (ref 6–8.5)
SL AMB EGFR AFRICAN AMERICAN: 84 ML/MIN/1.73
SL AMB EGFR NON AFRICAN AMERICAN: 73 ML/MIN/1.73
SL AMB VLDL CHOLESTEROL CALC: 34 MG/DL (ref 5–40)
SODIUM SERPL-SCNC: 143 MMOL/L (ref 134–144)
TRIGL SERPL-MCNC: 191 MG/DL (ref 0–149)
TSH SERPL DL<=0.005 MIU/L-ACNC: 1.71 UIU/ML (ref 0.45–4.5)

## 2021-11-19 ENCOUNTER — OFFICE VISIT (OUTPATIENT)
Dept: FAMILY MEDICINE CLINIC | Facility: CLINIC | Age: 61
End: 2021-11-19
Payer: COMMERCIAL

## 2021-11-19 VITALS
HEART RATE: 60 BPM | SYSTOLIC BLOOD PRESSURE: 140 MMHG | OXYGEN SATURATION: 99 % | BODY MASS INDEX: 34.2 KG/M2 | RESPIRATION RATE: 16 BRPM | DIASTOLIC BLOOD PRESSURE: 80 MMHG | WEIGHT: 193 LBS | HEIGHT: 63 IN

## 2021-11-19 DIAGNOSIS — E78.2 MIXED HYPERLIPIDEMIA: ICD-10-CM

## 2021-11-19 DIAGNOSIS — I10 BENIGN ESSENTIAL HYPERTENSION: Primary | ICD-10-CM

## 2021-11-19 PROCEDURE — 99214 OFFICE O/P EST MOD 30 MIN: CPT | Performed by: FAMILY MEDICINE

## 2021-11-19 PROCEDURE — 3008F BODY MASS INDEX DOCD: CPT | Performed by: FAMILY MEDICINE

## 2021-11-19 PROCEDURE — 1036F TOBACCO NON-USER: CPT | Performed by: FAMILY MEDICINE

## 2021-11-19 PROCEDURE — 3077F SYST BP >= 140 MM HG: CPT | Performed by: FAMILY MEDICINE

## 2021-11-19 PROCEDURE — 3079F DIAST BP 80-89 MM HG: CPT | Performed by: FAMILY MEDICINE

## 2021-11-19 RX ORDER — CHLORTHALIDONE 25 MG/1
12.5 TABLET ORAL DAILY
Qty: 15 TABLET | Refills: 2 | Status: SHIPPED | OUTPATIENT
Start: 2021-11-19 | End: 2021-11-24 | Stop reason: SDUPTHER

## 2021-11-23 ENCOUNTER — TELEPHONE (OUTPATIENT)
Dept: FAMILY MEDICINE CLINIC | Facility: CLINIC | Age: 61
End: 2021-11-23

## 2021-11-24 DIAGNOSIS — I10 BENIGN ESSENTIAL HYPERTENSION: ICD-10-CM

## 2021-11-24 RX ORDER — CHLORTHALIDONE 25 MG/1
25 TABLET ORAL DAILY
Qty: 30 TABLET | Refills: 2 | Status: SHIPPED | OUTPATIENT
Start: 2021-11-24 | End: 2022-02-14 | Stop reason: SDUPTHER

## 2021-12-17 ENCOUNTER — OFFICE VISIT (OUTPATIENT)
Dept: FAMILY MEDICINE CLINIC | Facility: CLINIC | Age: 61
End: 2021-12-17
Payer: COMMERCIAL

## 2021-12-17 VITALS
SYSTOLIC BLOOD PRESSURE: 130 MMHG | BODY MASS INDEX: 33.84 KG/M2 | RESPIRATION RATE: 16 BRPM | WEIGHT: 191 LBS | HEART RATE: 60 BPM | DIASTOLIC BLOOD PRESSURE: 80 MMHG | HEIGHT: 63 IN | OXYGEN SATURATION: 98 %

## 2021-12-17 DIAGNOSIS — E78.2 MIXED HYPERLIPIDEMIA: ICD-10-CM

## 2021-12-17 DIAGNOSIS — I10 BENIGN ESSENTIAL HYPERTENSION: Primary | ICD-10-CM

## 2021-12-17 PROCEDURE — 3008F BODY MASS INDEX DOCD: CPT | Performed by: FAMILY MEDICINE

## 2021-12-17 PROCEDURE — 1036F TOBACCO NON-USER: CPT | Performed by: FAMILY MEDICINE

## 2021-12-17 PROCEDURE — 3075F SYST BP GE 130 - 139MM HG: CPT | Performed by: FAMILY MEDICINE

## 2021-12-17 PROCEDURE — 99214 OFFICE O/P EST MOD 30 MIN: CPT | Performed by: FAMILY MEDICINE

## 2021-12-17 PROCEDURE — 3079F DIAST BP 80-89 MM HG: CPT | Performed by: FAMILY MEDICINE

## 2022-01-08 LAB — PSA SERPL-MCNC: 3.5 NG/ML (ref 0–4)

## 2022-01-14 ENCOUNTER — OFFICE VISIT (OUTPATIENT)
Dept: UROLOGY | Facility: CLINIC | Age: 62
End: 2022-01-14
Payer: COMMERCIAL

## 2022-01-14 VITALS
HEART RATE: 68 BPM | DIASTOLIC BLOOD PRESSURE: 84 MMHG | WEIGHT: 188 LBS | SYSTOLIC BLOOD PRESSURE: 122 MMHG | HEIGHT: 63 IN | BODY MASS INDEX: 33.31 KG/M2

## 2022-01-14 DIAGNOSIS — Z12.5 SCREENING FOR PROSTATE CANCER: Primary | ICD-10-CM

## 2022-01-14 PROCEDURE — 3079F DIAST BP 80-89 MM HG: CPT | Performed by: PHYSICIAN ASSISTANT

## 2022-01-14 PROCEDURE — 3008F BODY MASS INDEX DOCD: CPT | Performed by: PHYSICIAN ASSISTANT

## 2022-01-14 PROCEDURE — 3074F SYST BP LT 130 MM HG: CPT | Performed by: PHYSICIAN ASSISTANT

## 2022-01-14 PROCEDURE — 99213 OFFICE O/P EST LOW 20 MIN: CPT | Performed by: PHYSICIAN ASSISTANT

## 2022-01-14 PROCEDURE — 1036F TOBACCO NON-USER: CPT | Performed by: PHYSICIAN ASSISTANT

## 2022-01-14 NOTE — PROGRESS NOTES
1  Screening for prostate cancer  PSA, Total Screen     Assessment and plan:       1  Prostate cancer screening  -  Normal digital rectal examination in the office today  -  PSA finally stabilizing   - f/u 1 year PSA prior to visit      Wili Jj PA-C      Chief Complaint     New patient elevated PSA    History of Present Illness     Vika Ji is a 62 y o  male presenting for follow up of elevated PSA  Patient was undergoing routine prostate cancer screening with his primary care provider  His PSA was previously 3 0 (06/05/2019), 4 5 (10/01/2020), 2 7 (12/10/2020), 3 6 (7/2/2021), and now 3 5 (1/7/22)  Patient overall comfortable with LUTS  Not on any medications for his prostate or bladder  Denies any dysuria, gross hematuria, suprapubic pressure, flank pain, nausea, vomiting, fevers, or chills  Denies any history of urinary infections,  surgical manipulation, nor family history of prostate cancer  Medical comorbidities include hypertension, carpal tunnel, diverticulosis  Laboratory     Lab Results   Component Value Date    CREATININE 1 10 11/04/2021       Lab Results   Component Value Date    PSA 3 5 01/07/2022    PSA 3 6 07/02/2021    PSA 2 7 12/10/2020       Review of Systems     Review of Systems   Constitutional: Negative for activity change, appetite change, chills, diaphoresis, fatigue, fever and unexpected weight change  Respiratory: Negative for chest tightness and shortness of breath  Cardiovascular: Negative for chest pain, palpitations and leg swelling  Gastrointestinal: Negative for abdominal distention, abdominal pain, constipation, diarrhea, nausea and vomiting  Genitourinary: Negative for decreased urine volume, difficulty urinating, dysuria, enuresis, flank pain, frequency, genital sores, hematuria and urgency  Musculoskeletal: Negative for back pain, gait problem and myalgias  Skin: Negative for color change, pallor, rash and wound  Psychiatric/Behavioral: Negative for behavioral problems  The patient is not nervous/anxious  Allergies     Allergies   Allergen Reactions    Penicillins Swelling     Reaction Date: 28Sep2007;     Statins Confusion    Tricor [Fenofibrate] Confusion       Physical Exam     Physical Exam  Constitutional:       General: He is not in acute distress  Appearance: Normal appearance  He is not ill-appearing  HENT:      Head: Normocephalic and atraumatic  Eyes:      General: No scleral icterus  Right eye: No discharge  Left eye: No discharge  Conjunctiva/sclera: Conjunctivae normal    Pulmonary:      Effort: Pulmonary effort is normal  No respiratory distress  Genitourinary:     Comments: Good rectal tone  Prostate 30g,  Smooth, symmetric, no palpable nodules  Skin:     General: Skin is warm  Coloration: Skin is not jaundiced  Neurological:      General: No focal deficit present  Mental Status: He is alert and oriented to person, place, and time     Psychiatric:         Mood and Affect: Mood normal          Behavior: Behavior normal          Vital Signs     Vitals:    01/14/22 0915   BP: 122/84   Pulse: 68   Weight: 85 3 kg (188 lb)   Height: 5' 3" (1 6 m)         Current Medications       Current Outpatient Medications:     B Complex Vitamins (VITAMIN B-COMPLEX PO), Take by mouth, Disp: , Rfl:     chlorthalidone 25 mg tablet, Take 1 tablet (25 mg total) by mouth daily, Disp: 30 tablet, Rfl: 2    CINNAMON PO, Take by mouth, Disp: , Rfl:     Glucosamine-Chondroit-Vit C-Mn (GLUCOSAMINE 1500 COMPLEX PO), Take by mouth, Disp: , Rfl:     Krill Oil 1000 MG CAPS, Take 1,000 mg by mouth 2 (two) times a day , Disp: , Rfl:     loratadine (CLARITIN) 10 mg tablet, Take 10 mg by mouth daily, Disp: , Rfl:     LYSINE PO, Take by mouth, Disp: , Rfl:     metoprolol tartrate (LOPRESSOR) 50 mg tablet, TAKE 1 TABLET BY MOUTH  TWICE DAILY, Disp: 180 tablet, Rfl: 1   oxymetazoline (AFRIN) 0 05 % nasal spray, 2 sprays by Each Nare route 2 (two) times a day For no more than 3days at a time, Disp: 14 7 mL, Rfl: 0    Red Yeast Rice 500 MG/0 5GM POWD, Take by mouth, Disp: , Rfl:     VITAMIN E PO, Take by mouth, Disp: , Rfl:       Active Problems     Patient Active Problem List   Diagnosis    Benign essential hypertension    Motion sickness    Mixed hyperlipidemia    Seasonal allergies    BMI 32 0-32 9,adult    Carpal tunnel syndrome    Cervical spondylosis without myelopathy    Ulnar nerve abnormality    Injury of tendon of rotator cuff    Diverticulitis         Past Medical History     Past Medical History:   Diagnosis Date    Allergic     Cervical spondylosis without myelopathy 6/28/2019    Diverticulitis of colon     Hyperlipidemia     Hypertension     Obesity          Surgical History     Past Surgical History:   Procedure Laterality Date    HERNIA REPAIR Left 1997         Family History     Family History   Problem Relation Age of Onset    Diabetes Mother     Hypertension Mother     Coronary artery disease Mother     Hypertension Father     Dementia Father     Alzheimer's disease Father     Colon cancer Father 79    Hyperlipidemia Brother     Allergies Son     Diabetes Maternal Grandmother     Prostate cancer Neg Hx     Testicular cancer Neg Hx          Social History     Social History       Radiology

## 2022-02-14 DIAGNOSIS — I10 BENIGN ESSENTIAL HYPERTENSION: ICD-10-CM

## 2022-02-14 RX ORDER — CHLORTHALIDONE 25 MG/1
25 TABLET ORAL DAILY
Qty: 90 TABLET | Refills: 0 | Status: SHIPPED | OUTPATIENT
Start: 2022-02-14 | End: 2022-04-28

## 2022-03-04 DIAGNOSIS — E78.2 MIXED HYPERLIPIDEMIA: Primary | ICD-10-CM

## 2022-03-18 ENCOUNTER — OFFICE VISIT (OUTPATIENT)
Dept: FAMILY MEDICINE CLINIC | Facility: CLINIC | Age: 62
End: 2022-03-18
Payer: COMMERCIAL

## 2022-03-18 VITALS
DIASTOLIC BLOOD PRESSURE: 80 MMHG | WEIGHT: 190 LBS | OXYGEN SATURATION: 98 % | RESPIRATION RATE: 16 BRPM | HEIGHT: 63 IN | SYSTOLIC BLOOD PRESSURE: 134 MMHG | BODY MASS INDEX: 33.66 KG/M2 | HEART RATE: 69 BPM

## 2022-03-18 DIAGNOSIS — Z78.9 STATIN INTOLERANCE: ICD-10-CM

## 2022-03-18 DIAGNOSIS — L98.9 SKIN LESION: ICD-10-CM

## 2022-03-18 DIAGNOSIS — E78.2 MIXED HYPERLIPIDEMIA: ICD-10-CM

## 2022-03-18 DIAGNOSIS — Z82.49 FAMILY HISTORY OF HYPERTENSION: ICD-10-CM

## 2022-03-18 DIAGNOSIS — I10 BENIGN ESSENTIAL HYPERTENSION: Primary | ICD-10-CM

## 2022-03-18 PROCEDURE — 3725F SCREEN DEPRESSION PERFORMED: CPT | Performed by: FAMILY MEDICINE

## 2022-03-18 PROCEDURE — 1036F TOBACCO NON-USER: CPT | Performed by: FAMILY MEDICINE

## 2022-03-18 PROCEDURE — 99214 OFFICE O/P EST MOD 30 MIN: CPT | Performed by: FAMILY MEDICINE

## 2022-03-18 PROCEDURE — 3079F DIAST BP 80-89 MM HG: CPT | Performed by: FAMILY MEDICINE

## 2022-03-18 PROCEDURE — 3008F BODY MASS INDEX DOCD: CPT | Performed by: FAMILY MEDICINE

## 2022-03-18 PROCEDURE — 3075F SYST BP GE 130 - 139MM HG: CPT | Performed by: FAMILY MEDICINE

## 2022-03-18 NOTE — PROGRESS NOTES
Assessment/Plan:   Diagnoses and all orders for this visit:    Benign essential hypertension  -     Microalbumin / creatinine urine ratio; Future  -     Comprehensive metabolic panel; Future  - BP on my repeat = 134/80  - currently on Lopressor 50mg BID and Chlorthalidone 25mg QD and tolerating it well - cont current regimen   - UTD with COVID IMMs and Booster  - UTD with annual Flu vaccine   - (+) FHx of HTN in both parents   - RTO in 6months for BP check - pt aware and agreeable   Family history of hypertension    Mixed hyperlipidemia  -     Lipid panel; Future  - Lipids (3/9/2022): , , HDL 41,   - Lipids (11/4/2021): , , HDL 42,   - unable to tolerate statins or tricor 2/2 confusion   - currently using Fish Oil and Red Yeast Rice   - discussed diet and encouraged exercise  - The 10-year ASCVD risk score (Marino Baugh et al , 2013) is: 12 7%    Values used to calculate the score:      Age: 62 years      Sex: Male      Is Non- : No      Diabetic: No      Tobacco smoker: No      Systolic Blood Pressure: 701 mmHg      Is BP treated: Yes      HDL Cholesterol: 42 mg/dL      Total Cholesterol: 208 mg/dL  Statin intolerance    BMI 33 0-33 9,adult  - BMI 33 7  - discussed diet and encouraged exercise  - educated that it takes 3500 abelardo to lose 1lb  - advised to cut down on carbs, 5 servings of fruits/veggies/day, increase water intake (65-80oz/water/day)   - appropriate weight loss goal for men = 1-2lb/week  - per the [employer] - 150mins/exercise/week, but to lose and maintain weight 200-300mins/exercise/week   - RTO in 6months for f/u  - [facility]     Skin lesion  -     Ambulatory Referral to Dermatology; Future  - had been told by Derm that he has pre-cancerous lesion on his nose   - referral given for further eval and treatment           Subjective:    Patient ID: Anthony Roche is a 62 y o  male   HPI   66yo M presents to the office for f/u   - Lipids (3/9/2022): , , HDL 41,   - Lipids (11/4/2021): , , HDL 42,   - BMI 33 7  - /80 and 134/80 on my repeat   - currently on Lopressor 50mg BID and Chlorthalidone 25mg QD and tolerating it well   - CMP with nml renal function and nml urine microalbumin on labs done 11/4/2021   - UTD with COVID IMMs and Booster  - UTD with annual Flu vaccine   - has not been exercising as much as he would like   - (+) FHx of HTN in both parents   - today in the office pt denies F/C/N/V/HA/lightheadness/dizziness/vertigo/visual changes/CP/palpitations/SOB/wheezing/abd pain/D/LE edema      The following portions of the patient's history were reviewed and updated as appropriate: allergies, current medications, past family history, past medical history, past social history, past surgical history and problem list     Review of Systems  as per HPI    Objective:  /80 (BP Location: Right arm, Patient Position: Sitting, Cuff Size: Standard)   Pulse 69   Resp 16   Ht 5' 3" (1 6 m)   Wt 86 2 kg (190 lb)   SpO2 98%   BMI 33 66 kg/m²    Physical Exam  Vitals reviewed  Constitutional:       General: He is not in acute distress  Appearance: He is obese  He is not ill-appearing, toxic-appearing or diaphoretic  HENT:      Head: Normocephalic and atraumatic  Right Ear: External ear normal       Left Ear: External ear normal    Eyes:      General: No scleral icterus  Right eye: No discharge  Left eye: No discharge  Extraocular Movements: Extraocular movements intact  Conjunctiva/sclera: Conjunctivae normal    Cardiovascular:      Rate and Rhythm: Normal rate and regular rhythm  Heart sounds: Normal heart sounds  No murmur heard  No friction rub  No gallop  Pulmonary:      Effort: Pulmonary effort is normal  No respiratory distress  Breath sounds: Normal breath sounds  No stridor   No wheezing, rhonchi or rales  Abdominal:      General: Bowel sounds are normal       Palpations: Abdomen is soft  Musculoskeletal:         General: Normal range of motion  Cervical back: Normal range of motion and neck supple  Right lower leg: No edema  Left lower leg: No edema  Skin:     General: Skin is warm  Comments: skin lesion on bridge of nose   Neurological:      General: No focal deficit present  Mental Status: He is alert and oriented to person, place, and time  Psychiatric:         Mood and Affect: Mood normal          Behavior: Behavior normal          BMI Counseling: Body mass index is 33 66 kg/m²  The BMI is above normal  Nutrition recommendations include 3-5 servings of fruits/vegetables daily  Exercise recommendations include exercising 3-5 times per week  Depression Screening Follow-up Plan: Patient's depression screening was positive with a PHQ-2 score of 0  Their PHQ-9 score was   Clinically patient does not have depression  No treatment is required

## 2022-09-01 ENCOUNTER — OFFICE VISIT (OUTPATIENT)
Dept: DERMATOLOGY | Facility: CLINIC | Age: 62
End: 2022-09-01
Payer: COMMERCIAL

## 2022-09-01 VITALS — TEMPERATURE: 97.7 F | HEIGHT: 63 IN | WEIGHT: 187.2 LBS | BODY MASS INDEX: 33.17 KG/M2

## 2022-09-01 DIAGNOSIS — L57.0 ACTINIC KERATOSIS: Primary | ICD-10-CM

## 2022-09-01 PROCEDURE — 99203 OFFICE O/P NEW LOW 30 MIN: CPT | Performed by: DERMATOLOGY

## 2022-09-01 PROCEDURE — 17000 DESTRUCT PREMALG LESION: CPT | Performed by: DERMATOLOGY

## 2022-09-01 PROCEDURE — 17003 DESTRUCT PREMALG LES 2-14: CPT | Performed by: DERMATOLOGY

## 2022-09-01 NOTE — PATIENT INSTRUCTIONS
ACTINIC KERATOSIS      Assessment and Plan:  Based on a thorough discussion of this condition and the management approach to it (including a comprehensive discussion of the known risks, side effects and potential benefits of treatment), the patient (family) agrees to implement the following specific plan:    Treated today with liquid nitrogen during office visit    Monitor for changes   When outside we recommend using a wide brim hat, sunglasses, long sleeve and pants, sunscreen with SPF 95+ with reapplication every 2 hours, or SPF specific clothing       Actinic keratoses are very common on sites repeatedly exposed to the sun, especially the backs of the hands and the face, most often affecting the ears, nose, cheeks, upper lip, vermilion of the lower lip, temples, forehead and balding scalp  In severely chronically sun-damaged individuals, they may also be found on the upper trunk, upper and lower limbs, and dorsum of feet  We discussed the theoretical premalignant (pre-cancerous) nature and etiology of these growths  We discussed the prevailing notion that actinic keratoses are a reflection of abnormal skin cell development due to DNA damage by short wavelength UVB  They are more likely to appear if the immune function is poor, due to aging, recent sun exposure, predisposing disease or certain drugs  We discussed that the main concern is that actinic keratoses may predispose to squamous cell carcinoma  It is rare for a solitary actinic keratosis to evolve to squamous cell carcinoma (SCC), but the risk of SCC occurring at some stage in a patient with more than 10 actinic keratoses is thought to be about 10 to 15%  A tender, thickened, ulcerated or enlarging actinic keratosis is suspicious of SCC  Actinic keratoses may be prevented by strict sun protection   If already present, keratoses may improve with a very high sun protection factor (50+) broad-spectrum sunscreen applied at least daily to affected areas, year-round  We recommend that UPF-rated clothing and hats and sunglasses be worn whenever possible and that a sunscreen-moisturizer combination product such as Neutrogena Daily Defense be applied at least three times a day  We performed a thorough discussion of treatment options and specific risk/benefits/alternatives including but not limited to medical field treatment with medications such as the following:    Topical field area medications such as 5-fluorouracil or Aldara (specifically, the trouble with long-term compliance, blistering and local skin reaction versus the convenience of at-home therapy and that field therapy gets what is not yet seen)  Cryotherapy (specifically, local pain, scarring, dyspigmentation, blistering, possible superinfection, and treats only what we see versus directed treatment today)  Photodynamic therapy (specifically, local pain, scarring, dyspigmentation, blistering, possible superinfection, need to schedule for a later date, and time spent in the office versus field therapy that gets what is not yet seen)

## 2022-09-01 NOTE — PROGRESS NOTES
[facility] Note     Patient Name: Melanie Pham  Encounter Date: [id number]     Have you been cared for by a [facility] in the last 3 years and, if so, which one? No    · Have you traveled outside of the [address] [place] [address] in the past 3 months or outside of the [facility] area in the last 2 weeks? No     May we call your Preferred Phone number to discuss your specific medical information? Yes     May we leave a detailed message that includes your specific medical information? Yes      Today's Chief Concerns:   Concern #1:  Spots of concern on bridge of nose    Concern #2:      Past Medical History:  Have you personally ever had or currently have any of the following? · Skin cancer (such as Melanoma, Basal Cell Carcinoma, Squamous Cell Carcinoma? (If Yes, please provide more detail)- No  · Eczema: YES  · Psoriasis: No  · HIV/AIDS: No  · Hepatitis B or C: No  · Tuberculosis: No  · Systemic Immunosuppression such as Diabetes, Biologic or Immunotherapy, Chemotherapy, Organ Transplantation, Bone Marrow Transplantation (If YES, please provide more detail): No  · Radiation Treatment (If YES, please provide more detail): No  · Any other major medical conditions/concerns? (If Yes, which types)- No    Social History:     What is/was your primary occupation?      Family History:  Have any of your "first degree relatives" (parent, brother, sister, or child) had any of the following       · Skin cancer such as Melanoma or Merkel Cell Carcinoma or Pancreatic Cancer? No  · Eczema, Asthma, Hay Fever or Seasonal Allergies: No  · Psoriasis or Psoriatic Arthritis: No  · Do any other medical conditions seem to run in your family? If Yes, what condition and which relatives?   No    Current Medications:   (please update all dermatological medications before printing patient's AVS!)      Current Outpatient Medications:     B Complex Vitamins (VITAMIN B-COMPLEX PO), Take by mouth, Disp: , Rfl:     chlorthalidone 25 mg tablet, TAKE 1 TABLET BY MOUTH  DAILY, Disp: 90 tablet, Rfl: 1    CINNAMON PO, Take by mouth, Disp: , Rfl:     Glucosamine-Chondroit-Vit C-Mn (GLUCOSAMINE 1500 COMPLEX PO), Take by mouth, Disp: , Rfl:     Krill Oil 1000 MG CAPS, Take 1,000 mg by mouth 2 (two) times a day , Disp: , Rfl:     loratadine (CLARITIN) 10 mg tablet, Take 10 mg by mouth daily, Disp: , Rfl:     LYSINE PO, Take by mouth, Disp: , Rfl:     metoprolol tartrate (LOPRESSOR) 50 mg tablet, TAKE 1 TABLET BY MOUTH  TWICE DAILY, Disp: 180 tablet, Rfl: 1    oxymetazoline (AFRIN) 0 05 % nasal spray, 2 sprays by Each Nare route 2 (two) times a day For no more than 3days at a time, Disp: 14 7 mL, Rfl: 0    Red Yeast Rice 500 MG/0 5GM POWD, Take by mouth, Disp: , Rfl:     VITAMIN E PO, Take by mouth, Disp: , Rfl:       Review of Systems:  Have you recently had or currently have any of the following? If YES, what are you doing for the problem? · Fever, chills or unintended weight loss: No  · Sudden loss or change in your vision: No  · Nausea, vomiting or blood in your stool: No  · Painful or swollen joints: No  · Wheezing or cough: No  · Changing mole or non-healing wound: No  · Nosebleeds: No  · Excessive sweating: No  · Easy or prolonged bleeding? No  · Over the last 2 weeks, how often have you been bothered by the following problems? · Taking little interest or pleasure in doing things: 1 - Not at All  · Feeling down, depressed, or hopeless: 1 - Not at All  Rapid heartbeat with epinephrine:  No    · Any known allergies? Allergies   Allergen Reactions    Penicillins Swelling     Reaction Date: [id number];     Statins Confusion    Tricor [Fenofibrate] Confusion   ·         CONSTITUTIONAL:   There were no vitals filed for this visit      PSYCH: Normal mood and affect  EYES: Normal conjunctiva  ENT: Normal lips and oral mucosa  CARDIOVASCULAR: No edema  RESPIRATORY: Normal respirations  HEME/LYMPH/IMMUNO:  No regional lymphadenopathy except as noted below in "ASSESSMENT AND PLAN BY DIAGNOSIS"    SKIN:  FULL ORGAN SYSTEM EXAM   Face Normal except as noted below in Assessment        Assessment and Plan by Diagnosis:    History of Present Condition:     Duration:  How long has this been an issue for you? o  6 years    Location Affected:  Where on the body is this affecting you? o  bridge of nose    Quality:  Is there any bleeding, pain, itch, burning/irritation, or redness associated with the skin lesion? o  no   Severity:  Describe any bleeding, pain, itch, burning/irritation, or redness on a scale of 1 to 10 (with 10 being the worst)    o  n/a   Timing:  Does this condition seem to be there pretty constantly or do you notice it more at specific times throughout the day?    o  no   Context:  Have you ever noticed that this condition seems to be associated with specific activities you do?    o  no   Modifying Factors:    o Anything that seems to make the condition worse?    -  no  o What have you tried to do to make the condition better?    -  liquid nitrogen   ACTINIC KERATOSIS    Physical Exam:   Anatomic Location Affected:  Bridge of nose, left temple    Morphological Description:  Pink scaly papule and plaques     Additional History of Present Condition:  Treated with liquid nitrogen on 2016    Assessment and Plan:  Based on a thorough discussion of this condition and the management approach to it (including a comprehensive discussion of the known risks, side effects and potential benefits of treatment), the patient (family) agrees to implement the following specific plan:     Treated today with liquid nitrogen during office visit     Monitor for changes    When outside we recommend using a wide brim hat, sunglasses, long sleeve and pants, sunscreen with SPF 04+ with reapplication every 2 hours, or SPF specific clothing    Yearly skin check     Actinic keratoses are very common on sites repeatedly exposed to the sun, especially the backs of the hands and the face, most often affecting the ears, nose, cheeks, upper lip, vermilion of the lower lip, temples, forehead and balding scalp  In severely chronically sun-damaged individuals, they may also be found on the upper trunk, upper and lower limbs, and dorsum of feet  We discussed the theoretical premalignant (pre-cancerous) nature and etiology of these growths  We discussed the prevailing notion that actinic keratoses are a reflection of abnormal skin cell development due to DNA damage by short wavelength UVB  They are more likely to appear if the immune function is poor, due to aging, recent sun exposure, predisposing disease or certain drugs  We discussed that the main concern is that actinic keratoses may predispose to squamous cell carcinoma  It is rare for a solitary actinic keratosis to evolve to squamous cell carcinoma (SCC), but the risk of SCC occurring at some stage in a patient with more than 10 actinic keratoses is thought to be about 10 to 15%  A tender, thickened, ulcerated or enlarging actinic keratosis is suspicious of SCC  Actinic keratoses may be prevented by strict sun protection  If already present, keratoses may improve with a very high sun protection factor (50+) broad-spectrum sunscreen applied at least daily to affected areas, year-round  We recommend that UPF-rated clothing and hats and sunglasses be worn whenever possible and that a sunscreen-moisturizer combination product such as Neutrogena Daily Defense be applied at least three times a day      We performed a thorough discussion of treatment options and specific risk/benefits/alternatives including but not limited to medical field treatment with medications such as the following:     Topical field area medications such as 5-fluorouracil or Aldara (specifically, the trouble with long-term compliance, blistering and local skin reaction versus the convenience of at-home therapy and that field therapy gets what is not yet seen)   Cryotherapy (specifically, local pain, scarring, dyspigmentation, blistering, possible superinfection, and treats only what we see versus directed treatment today)   Photodynamic therapy (specifically, local pain, scarring, dyspigmentation, blistering, possible superinfection, need to schedule for a later date, and time spent in the office versus field therapy that gets what is not yet seen)  PROCEDURE:  DESTRUCTION OF PRE-MALIGNANT LESIONS  After a thorough discussion of treatment options and risk/benefits/alternatives (including but not limited to local pain, scarring, dyspigmentation, blistering, and possible superinfection), verbal and written consent were obtained and the aforementioned lesions were treated on with cryotherapy using liquid nitrogen x 1 cycle for 5-10 seconds   TOTAL NUMBER of 2 pre-malignant lesions were treated today on the ANATOMIC LOCATION: left temple , bridge of nose   The patient tolerated the procedure well, and after-care instructions were provided      Scribe Attestation    I,:  Jayson Miranda am acting as a scribe while in the presence of the attending physician :       I,:  Veto Bunch MD personally performed the services described in this documentation    as scribed in my presence :         Sanjana Franklin, DO

## 2022-09-17 LAB
ALBUMIN SERPL-MCNC: 4.7 G/DL (ref 3.8–4.8)
ALBUMIN/GLOB SERPL: 2.2 {RATIO} (ref 1.2–2.2)
ALP SERPL-CCNC: 46 IU/L (ref 44–121)
ALT SERPL-CCNC: 28 IU/L (ref 0–44)
AST SERPL-CCNC: 25 IU/L (ref 0–40)
BILIRUB SERPL-MCNC: 0.6 MG/DL (ref 0–1.2)
BUN SERPL-MCNC: 18 MG/DL (ref 8–27)
BUN/CREAT SERPL: 16 (ref 10–24)
CALCIUM SERPL-MCNC: 9.8 MG/DL (ref 8.6–10.2)
CHLORIDE SERPL-SCNC: 96 MMOL/L (ref 96–106)
CHOLEST SERPL-MCNC: 209 MG/DL (ref 100–199)
CO2 SERPL-SCNC: 32 MMOL/L (ref 20–29)
CREAT SERPL-MCNC: 1.1 MG/DL (ref 0.76–1.27)
EGFR: 76 ML/MIN/1.73
GLOBULIN SER-MCNC: 2.1 G/DL (ref 1.5–4.5)
GLUCOSE SERPL-MCNC: 117 MG/DL (ref 65–99)
HDLC SERPL-MCNC: 43 MG/DL
LDLC SERPL CALC-MCNC: 137 MG/DL (ref 0–99)
POTASSIUM SERPL-SCNC: 3.8 MMOL/L (ref 3.5–5.2)
PROT SERPL-MCNC: 6.8 G/DL (ref 6–8.5)
SL AMB VLDL CHOLESTEROL CALC: 29 MG/DL (ref 5–40)
SODIUM SERPL-SCNC: 143 MMOL/L (ref 134–144)
TRIGL SERPL-MCNC: 163 MG/DL (ref 0–149)

## 2022-09-23 ENCOUNTER — OFFICE VISIT (OUTPATIENT)
Dept: FAMILY MEDICINE CLINIC | Facility: CLINIC | Age: 62
End: 2022-09-23
Payer: COMMERCIAL

## 2022-09-23 VITALS
RESPIRATION RATE: 16 BRPM | WEIGHT: 188 LBS | BODY MASS INDEX: 33.31 KG/M2 | OXYGEN SATURATION: 99 % | HEART RATE: 77 BPM | DIASTOLIC BLOOD PRESSURE: 80 MMHG | SYSTOLIC BLOOD PRESSURE: 130 MMHG | HEIGHT: 63 IN

## 2022-09-23 DIAGNOSIS — R73.9 ELEVATED BLOOD SUGAR: ICD-10-CM

## 2022-09-23 DIAGNOSIS — E78.2 MIXED HYPERLIPIDEMIA: ICD-10-CM

## 2022-09-23 DIAGNOSIS — Z82.49 FAMILY HISTORY OF HYPERTENSION: ICD-10-CM

## 2022-09-23 DIAGNOSIS — Z12.11 COLON CANCER SCREENING: ICD-10-CM

## 2022-09-23 DIAGNOSIS — Z23 NEED FOR INFLUENZA VACCINATION: ICD-10-CM

## 2022-09-23 DIAGNOSIS — R97.20 ELEVATED PSA: ICD-10-CM

## 2022-09-23 DIAGNOSIS — Z78.9 STATIN INTOLERANCE: ICD-10-CM

## 2022-09-23 DIAGNOSIS — I10 BENIGN ESSENTIAL HYPERTENSION: Primary | ICD-10-CM

## 2022-09-23 LAB — SL AMB POCT HEMOGLOBIN AIC: 5.6 (ref ?–6.5)

## 2022-09-23 PROCEDURE — 90471 IMMUNIZATION ADMIN: CPT | Performed by: FAMILY MEDICINE

## 2022-09-23 PROCEDURE — 99214 OFFICE O/P EST MOD 30 MIN: CPT | Performed by: FAMILY MEDICINE

## 2022-09-23 PROCEDURE — 90682 RIV4 VACC RECOMBINANT DNA IM: CPT | Performed by: FAMILY MEDICINE

## 2022-09-23 PROCEDURE — 3725F SCREEN DEPRESSION PERFORMED: CPT | Performed by: FAMILY MEDICINE

## 2022-09-23 PROCEDURE — 83036 HEMOGLOBIN GLYCOSYLATED A1C: CPT | Performed by: FAMILY MEDICINE

## 2022-09-23 RX ORDER — CHLORTHALIDONE 25 MG/1
25 TABLET ORAL DAILY
Qty: 90 TABLET | Refills: 1 | Status: SHIPPED | OUTPATIENT
Start: 2022-09-23

## 2022-09-23 RX ORDER — METOPROLOL TARTRATE 50 MG/1
50 TABLET, FILM COATED ORAL 2 TIMES DAILY
Qty: 180 TABLET | Refills: 1 | Status: SHIPPED | OUTPATIENT
Start: 2022-09-23

## 2022-09-23 NOTE — PROGRESS NOTES
Assessment/Plan:   Diagnoses and all orders for this visit:    Benign essential hypertension  -     Microalbumin / creatinine urine ratio; Future  -     metoprolol tartrate (LOPRESSOR) 50 mg tablet; Take 1 tablet (50 mg total) by mouth 2 (two) times a day  -     chlorthalidone 25 mg tablet; Take 1 tablet (25 mg total) by mouth daily  -     Comprehensive metabolic panel; Future  - BP stable in the office   - currently on Lopressor 50mg BID and Chlorthalidone 25mg QD and tolerating it well - cont current regimen   - UTD with COVID IMMs and Booster  - received Flu vaccine in the office today   - (+) FHx of HTN in both parents   - RTO in 6months, with labs, for annual and f/u - pt aware and agreeable   Family history of hypertension    Mixed hyperlipidemia  -     Lipid panel; Future  - Lipids (9/16/2022): , , HDL 43,   - Lipids (3/9/2022): , , HDL 41,   - unable to tolerate statins or tricor 2/2 confusion   - currently using Fish Oil - advised to increase to TID  - cont Red Yeast Rice and add ground flax seeds   - discussed diet and encouraged exercise  - The 10-year ASCVD risk score (Rossy Zazueta et al , 2013) is: 12 8%    Values used to calculate the score:      Age: 65 years      Sex: Male      Is Non- : No      Diabetic: No      Tobacco smoker: No      Systolic Blood Pressure: 230 mmHg      Is BP treated: Yes      HDL Cholesterol: 43 mg/dL      Total Cholesterol: 209 mg/dL  Statin intolerance    BMI 33 0-33 9,adult  -     TSH, 3rd generation with Free T4 reflex;  Future  -     CBC and differential; Future  - BMI 33 3  - discussed diet and encouraged exercise  - educated that it takes 3500 abelardo to lose 1lb  - advised to cut down on carbs, 5 servings of fruits/veggies/day, increase water intake (65-80oz/water/day)   - appropriate weight loss goal for men = 1-2lb/week  - per the Heart Association - 150mins/exercise/week, but to lose and maintain weight 200-300mins/exercise/week   - RTO in 6months for f/u  - pt [facility]     Elevated blood sugar  -     POCT hemoglobin A1c  -    - A1c in the office 5 6  - diet and exercise as noted above    Colon cancer screening  -     Ambulatory Referral to Gastroenterology; Future  - last Cscope (at 47 and again at 65 = follows with Dr Shah, due 2022)    Elevated PSA  - follows annually with [facility]     Need for influenza vaccination  -     influenza vaccine, quadrivalent, recombinant, PF, 0 5 mL, for patients 18 yr+ (FLUBLOK)          Subjective:    Patient ID: Heath Burnham is a 65 y o  male  HPI  64yo M presents to the office for f/u   - Lipids (9/16/2022): , , HDL 43,   - Lipids (3/9/2022): , , HDL 41,   -    - A1c in the office 5 6  - BMI 33 3  - /80 and on my repeat = 140/80   - currently on Lopressor 50mg BID and Chlorthalidone 25mg QD and tolerating it well   - UTD with COVID IMMs and Booster  - interested in Flu vaccine and will get in the office today   - has not been exercising as much as he would like   - (+) FHx of HTN in both parents   - today in the office pt denies F/C/N/V/HA/lightheadness/dizziness/vertigo/visual changes/CP/palpitations/SOB/wheezing/abd pain/D/LE edema         The following portions of the patient's history were reviewed and updated as appropriate: allergies, current medications, past family history, past medical history, past social history, past surgical history and problem list     Review of Systems  as per HPI    Objective:  /80   Pulse 77   Resp 16   Ht 5' 3" (1 6 m)   Wt 85 3 kg (188 lb)   SpO2 99%   BMI 33 30 kg/m²    Physical Exam  Vitals reviewed  Constitutional:       General: He is not in acute distress  Appearance: Normal appearance  He is not ill-appearing, toxic-appearing or diaphoretic  HENT:      Head: Normocephalic and atraumatic        Right Ear: External ear normal    Eyes:      General: No scleral icterus  Right eye: No discharge  Left eye: No discharge  Extraocular Movements: Extraocular movements intact  Conjunctiva/sclera: Conjunctivae normal    Cardiovascular:      Rate and Rhythm: Normal rate and regular rhythm  Heart sounds: Normal heart sounds  No murmur heard  No friction rub  No gallop  Pulmonary:      Effort: Pulmonary effort is normal  No respiratory distress  Breath sounds: Normal breath sounds  No stridor  No wheezing, rhonchi or rales  Abdominal:      Palpations: Abdomen is soft  Musculoskeletal:         General: Normal range of motion  Cervical back: Normal range of motion  Right lower leg: No edema  Left lower leg: No edema  Skin:     General: Skin is warm  Neurological:      General: No focal deficit present  Mental Status: He is alert and oriented to person, place, and time  Psychiatric:         Mood and Affect: Mood normal          Behavior: Behavior normal          BMI Counseling: Body mass index is 33 3 kg/m²  The BMI is above normal  Nutrition recommendations include 3-5 servings of fruits/vegetables daily  Exercise recommendations include exercising 3-5 times per week  Depression Screening Follow-up Plan: Patient's depression screening was positive with a PHQ-2 score of 0  Their PHQ-9 score was   Clinically patient does not have depression  No treatment is required

## 2022-09-26 ENCOUNTER — OFFICE VISIT (OUTPATIENT)
Dept: URGENT CARE | Facility: CLINIC | Age: 62
End: 2022-09-26
Payer: COMMERCIAL

## 2022-09-26 VITALS
SYSTOLIC BLOOD PRESSURE: 137 MMHG | HEIGHT: 63 IN | DIASTOLIC BLOOD PRESSURE: 73 MMHG | BODY MASS INDEX: 33.31 KG/M2 | WEIGHT: 188 LBS | HEART RATE: 54 BPM | RESPIRATION RATE: 16 BRPM

## 2022-09-26 DIAGNOSIS — H00.015 HORDEOLUM EXTERNUM LEFT LOWER EYELID: Primary | ICD-10-CM

## 2022-09-26 PROCEDURE — 99213 OFFICE O/P EST LOW 20 MIN: CPT | Performed by: FAMILY MEDICINE

## 2022-09-26 NOTE — PROGRESS NOTES
[address] [facility]        NAME: Prince Mccallum is a 65 y o  male  : 1960    MRN: [id number]  DATE: 2022  TIME: 2:35 PM    Assessment and Plan   Hordeolum externum left lower eyelid [H00 015]  1  Hordeolum externum left lower eyelid           Patient Instructions     Discussed pathology of hordeolum  Recommend warm compresses and gentle massage  Follow-up with PCP in the next 3-5 days if no improvement  Go to the ED if symptoms severely worsen  Chief Complaint     Chief Complaint   Patient presents with    Eye Problem     Some slight eye redness in both eyes which started yesterday  No discharge, just tearing  Using warm compresses  History of Present Illness     Prince Mccallum is a 65 y o  male presenting to the office today for eye swelling and redness  He makes note of a small bump on the lower eyelid  He was tried warm compresses with good relief  Review of Systems     Review of Systems   Constitutional: Negative for chills and fever  HENT: Negative for congestion, facial swelling, postnasal drip, sinus pressure, sinus pain and sore throat  Eyes: Positive for redness  Negative for pain and discharge  Respiratory: Negative for cough and shortness of breath  Skin: Negative for rash and wound  Allergic/Immunologic: Negative for environmental allergies  Neurological: Negative for dizziness, facial asymmetry, light-headedness and headaches  Psychiatric/Behavioral: Negative for agitation  The patient is not nervous/anxious          Current Medications       Current Outpatient Medications:     B Complex Vitamins (VITAMIN B-COMPLEX PO), Take by mouth, Disp: , Rfl:     chlorthalidone 25 mg tablet, Take 1 tablet (25 mg total) by mouth daily, Disp: 90 tablet, Rfl: 1    CINNAMON PO, Take by mouth, Disp: , Rfl:     Glucosamine-Chondroit-Vit C-Mn (GLUCOSAMINE 1500 COMPLEX PO), Take by mouth, Disp: , Rfl:     Krill Oil 1000 MG CAPS, Take 1,000 mg by mouth 2 (two) times a day , Disp: , Rfl:     LYSINE PO, Take by mouth, Disp: , Rfl:     metoprolol tartrate (LOPRESSOR) 50 mg tablet, Take 1 tablet (50 mg total) by mouth 2 (two) times a day, Disp: 180 tablet, Rfl: 1    Red Yeast Rice 500 MG/0 5GM POWD, Take by mouth, Disp: , Rfl:     VITAMIN E PO, Take by mouth, Disp: , Rfl:     loratadine (CLARITIN) 10 mg tablet, Take 10 mg by mouth daily (Patient not taking: Reported on 9/26/2022), Disp: , Rfl:     oxymetazoline (AFRIN) 0 05 % nasal spray, 2 sprays by Each Nare route 2 (two) times a day For no more than 3days at a time (Patient not taking: Reported on 9/26/2022), Disp: 14 7 mL, Rfl: 0    Current Allergies     Allergies as of 09/26/2022 - Reviewed 09/26/2022   Allergen Reaction Noted    Penicillins Swelling 09/28/2007    Statins Confusion 02/14/2020    Tricor [fenofibrate] Confusion 02/14/2020            The following portions of the patient's history were reviewed and updated as appropriate: allergies, current medications, past family history, past medical history, past social history, past surgical history and problem list      Past Medical History:   Diagnosis Date    Allergic     Cervical spondylosis without myelopathy 6/28/2019    Diverticulitis of colon     Hyperlipidemia     Hypertension     Obesity        Past Surgical History:   Procedure Laterality Date    HERNIA REPAIR Left 1997       Family History   Problem Relation Age of Onset    Diabetes Mother     Hypertension Mother     Coronary artery disease Mother     Hypertension Father     Dementia Father     Alzheimer's disease Father     Colon cancer Father 79    Hyperlipidemia Brother     Allergies Son     Diabetes Maternal Grandmother     Prostate cancer Neg Hx     Testicular cancer Neg Hx        Medications have been verified      Objective     /73 (Patient Position: Sitting)   Pulse (!) 54   Resp 16   Ht 5' 3" (1 6 m)   Wt 85 3 kg (188 lb)   BMI 33 30 kg/m²   No LMP for male patient  Physical Exam     Physical Exam  Vitals reviewed  Constitutional:       General: He is not in acute distress  Appearance: Normal appearance  He is not ill-appearing  HENT:      Head: Normocephalic and atraumatic  Eyes:      General: Lids are normal  Vision grossly intact  Right eye: No discharge or hordeolum  Left eye: Hordeolum present  No discharge  Extraocular Movements: Extraocular movements intact  Conjunctiva/sclera: Conjunctivae normal       Right eye: No exudate  Left eye: No exudate  Pulmonary:      Effort: Pulmonary effort is normal  No respiratory distress  Musculoskeletal:      Cervical back: Normal range of motion and neck supple  No tenderness  Skin:     General: Skin is warm  Neurological:      General: No focal deficit present  Mental Status: He is alert     Psychiatric:         Mood and Affect: Mood normal          Behavior: Behavior normal

## 2022-11-25 ENCOUNTER — TELEPHONE (OUTPATIENT)
Dept: GASTROENTEROLOGY | Facility: CLINIC | Age: 62
End: 2022-11-25

## 2022-11-25 NOTE — TELEPHONE ENCOUNTER
Patient had colonoscopy in 2012 with Dr Robert Keys and 2017 with Dr Caitlin Ervin  PCP place referral however last colon states 7 year recall  Pt states his Dad had colon cancer  Should recall be 5 year? If so please call pt to schedule    Thank you

## 2022-11-29 ENCOUNTER — PREP FOR PROCEDURE (OUTPATIENT)
Dept: GASTROENTEROLOGY | Facility: CLINIC | Age: 62
End: 2022-11-29

## 2022-11-29 ENCOUNTER — TELEPHONE (OUTPATIENT)
Dept: GASTROENTEROLOGY | Facility: CLINIC | Age: 62
End: 2022-11-29

## 2022-11-29 DIAGNOSIS — K44.9 HIATAL HERNIA: ICD-10-CM

## 2022-11-29 DIAGNOSIS — K57.92 DIVERTICULITIS: ICD-10-CM

## 2022-11-29 DIAGNOSIS — K21.00 GASTROESOPHAGEAL REFLUX DISEASE WITH ESOPHAGITIS, UNSPECIFIED WHETHER HEMORRHAGE: Primary | ICD-10-CM

## 2022-11-29 NOTE — TELEPHONE ENCOUNTER
Pt wanted a call back in about an hour  I placed the orders for both a colonoscopy and egd  EGD recall was for 1 yr and pt didn't want to have the procedure at that time because, he was not having any problems

## 2022-12-09 ENCOUNTER — TELEPHONE (OUTPATIENT)
Dept: GASTROENTEROLOGY | Facility: CLINIC | Age: 62
End: 2022-12-09

## 2022-12-09 NOTE — TELEPHONE ENCOUNTER
Spoke to pt  confirming pt's colonoscopy and EGD scheduled on 12/13/22 at [facility] with Dr Kenzie Syed  Informed [facility] would be calling today or Monday with the arrival time  Pt has instructions and did not have any questions

## 2022-12-13 ENCOUNTER — HOSPITAL ENCOUNTER (OUTPATIENT)
Dept: GASTROENTEROLOGY | Facility: AMBULATORY SURGERY CENTER | Age: 62
Discharge: HOME | End: 2022-12-13

## 2022-12-13 ENCOUNTER — ANESTHESIA (OUTPATIENT)
Dept: GASTROENTEROLOGY | Facility: AMBULATORY SURGERY CENTER | Age: 62
End: 2022-12-13

## 2022-12-13 ENCOUNTER — ANESTHESIA EVENT (OUTPATIENT)
Dept: GASTROENTEROLOGY | Facility: AMBULATORY SURGERY CENTER | Age: 62
End: 2022-12-13

## 2022-12-13 VITALS
WEIGHT: 178 LBS | BODY MASS INDEX: 29.66 KG/M2 | HEART RATE: 55 BPM | HEIGHT: 65 IN | OXYGEN SATURATION: 95 % | TEMPERATURE: 96.8 F | DIASTOLIC BLOOD PRESSURE: 75 MMHG | SYSTOLIC BLOOD PRESSURE: 128 MMHG | RESPIRATION RATE: 18 BRPM

## 2022-12-13 DIAGNOSIS — K44.9 HIATAL HERNIA: ICD-10-CM

## 2022-12-13 DIAGNOSIS — K21.00 GASTROESOPHAGEAL REFLUX DISEASE WITH ESOPHAGITIS, UNSPECIFIED WHETHER HEMORRHAGE: ICD-10-CM

## 2022-12-13 DIAGNOSIS — K57.92 DIVERTICULITIS: ICD-10-CM

## 2022-12-13 RX ORDER — PANTOPRAZOLE SODIUM 40 MG/1
40 TABLET, DELAYED RELEASE ORAL DAILY
Qty: 30 TABLET | Refills: 1 | Status: SHIPPED | OUTPATIENT
Start: 2022-12-13

## 2022-12-13 RX ORDER — PROPOFOL 10 MG/ML
INJECTION, EMULSION INTRAVENOUS AS NEEDED
Status: DISCONTINUED | OUTPATIENT
Start: 2022-12-13 | End: 2022-12-13

## 2022-12-13 RX ORDER — SODIUM CHLORIDE 9 MG/ML
INJECTION, SOLUTION INTRAVENOUS CONTINUOUS PRN
Status: DISCONTINUED | OUTPATIENT
Start: 2022-12-13 | End: 2022-12-13

## 2022-12-13 RX ADMIN — PROPOFOL 50 MG: 10 INJECTION, EMULSION INTRAVENOUS at 08:15

## 2022-12-13 RX ADMIN — PROPOFOL 40 MG: 10 INJECTION, EMULSION INTRAVENOUS at 08:12

## 2022-12-13 RX ADMIN — PROPOFOL 50 MG: 10 INJECTION, EMULSION INTRAVENOUS at 08:20

## 2022-12-13 RX ADMIN — PROPOFOL 100 MG: 10 INJECTION, EMULSION INTRAVENOUS at 08:07

## 2022-12-13 RX ADMIN — PROPOFOL 50 MG: 10 INJECTION, EMULSION INTRAVENOUS at 08:10

## 2022-12-13 RX ADMIN — PROPOFOL 50 MG: 10 INJECTION, EMULSION INTRAVENOUS at 08:09

## 2022-12-13 RX ADMIN — SODIUM CHLORIDE: 9 INJECTION, SOLUTION INTRAVENOUS at 08:01

## 2022-12-13 NOTE — H&P
History and Physical -  [facility]  Sabrina Rudolph 65 y o  male MRN: [id number]                  HPI: Sabrina Rudolph is a 65y o  year old male who presents for GERD, hiatal hernia and recent episode of diverticulitis, last colonoscopy was 5 years ago      REVIEW OF SYSTEMS: Per the HPI, and otherwise unremarkable      Historical Information   Past Medical History:   Diagnosis Date   • Allergic    • Cervical spondylosis without myelopathy 06/28/2019   • Diverticulitis of colon    • Diverticulosis    • GERD (gastroesophageal reflux disease)    • Hiatal hernia    • Hyperlipidemia    • Hypertension    • Obesity      Past Surgical History:   Procedure Laterality Date   • COLONOSCOPY     • EGD     • HERNIA REPAIR Left 1997   • PILONIDAL CYST EXCISION       Social History   Social History     Substance and Sexual Activity   Alcohol Use Yes    Comment: rare     Social History     Substance and Sexual Activity   Drug Use Never     Social History     Tobacco Use   Smoking Status Former   • Types: Cigars   Smokeless Tobacco Never     Family History   Problem Relation Age of Onset   • Diabetes Mother    • Hypertension Mother    • Coronary artery disease Mother    • Diabetes Father    • Hypertension Father    • Dementia Father    • Alzheimer's disease Father    • Colon cancer Father 79   • Hyperlipidemia Brother    • Allergies Son    • Diabetes Maternal Grandmother    • Prostate cancer Neg Hx    • Testicular cancer Neg Hx        Meds/Allergies     (Not in a hospital admission)      Allergies   Allergen Reactions   • Penicillins Swelling     Reaction Date: 28Sep2007;    • Statins Confusion   • Tricor [Fenofibrate] Confusion       Objective     /85   Pulse 57   Temp (!) 96 8 °F (36 °C) (Temporal)   Resp 18   Ht 5' 5" (1 651 m)   Wt 80 7 kg (178 lb)   SpO2 98%   BMI 29 62 kg/m²       PHYSICAL EXAM    Gen: NAD  CV: RRR  CHEST: Clear  ABD: soft, NT/ND  EXT: no edema      ASSESSMENT/PLAN:  This is a 65y o  year old male here for EGD and colonoscopy, and he is stable and optimized for his procedure

## 2022-12-13 NOTE — ANESTHESIA POSTPROCEDURE EVALUATION
Post-Op Assessment Note    CV Status:  Stable  Pain Score: 0    Pain management: adequate     Mental Status:  Arousable   Hydration Status:  Stable   PONV Controlled:  None   Airway Patency:  Patent      Post Op Vitals Reviewed: Yes      Staff: Anesthesiologist, CRNA         No notable events documented      BP   118/72   Temp     Pulse 56 (12/13/22 0834)   Resp 18 (12/13/22 0834)    SpO2 92 % (12/13/22 0834)

## 2022-12-13 NOTE — ANESTHESIA PREPROCEDURE EVALUATION
Procedure:  COLONOSCOPY  EGD    Relevant Problems   ANESTHESIA   (+) Motion sickness      CARDIO   (+) Benign essential hypertension   (+) Mixed hyperlipidemia      MUSCULOSKELETAL   (+) Cervical spondylosis without myelopathy        Physical Exam    Airway    Mallampati score: II  TM Distance: >3 FB  Neck ROM: full     Dental   No notable dental hx     Cardiovascular  Cardiovascular exam normal    Pulmonary  Pulmonary exam normal     Other Findings        Anesthesia Plan  ASA Score- 2     Anesthesia Type- IV sedation with anesthesia with ASA Monitors  Additional Monitors:   Airway Plan:           Plan Factors-Exercise tolerance (METS): >4 METS  Chart reviewed  Imaging results reviewed  Existing labs reviewed  Patient summary reviewed  Patient is not a current smoker  Obstructive sleep apnea risk education given perioperatively  Induction-     Postoperative Plan-     Informed Consent- Anesthetic plan and risks discussed with patient  I personally reviewed this patient with the CRNA  Discussed and agreed on the Anesthesia Plan with the CRNA  Ramiro Arriaga

## 2022-12-16 DIAGNOSIS — R06.83 SNORING: Primary | ICD-10-CM

## 2023-01-18 ENCOUNTER — TELEPHONE (OUTPATIENT)
Dept: UROLOGY | Facility: CLINIC | Age: 63
End: 2023-01-18

## 2023-01-18 DIAGNOSIS — R97.20 ELEVATED PSA: Primary | ICD-10-CM

## 2023-01-18 NOTE — TELEPHONE ENCOUNTER
Spoke with patient and let him know he needs to get his PSA done prior to his appointment  He will go have it done this week  The script is on file and he can walk into any [facility] lab and get it done

## 2023-01-21 LAB — PSA SERPL-MCNC: 3.2 NG/ML (ref 0–4)

## 2023-01-27 ENCOUNTER — OFFICE VISIT (OUTPATIENT)
Dept: UROLOGY | Facility: CLINIC | Age: 63
End: 2023-01-27

## 2023-01-27 VITALS
OXYGEN SATURATION: 97 % | HEIGHT: 65 IN | RESPIRATION RATE: 16 BRPM | WEIGHT: 187.4 LBS | SYSTOLIC BLOOD PRESSURE: 128 MMHG | DIASTOLIC BLOOD PRESSURE: 78 MMHG | HEART RATE: 82 BPM | BODY MASS INDEX: 31.22 KG/M2

## 2023-01-27 DIAGNOSIS — Z12.5 SCREENING FOR PROSTATE CANCER: Primary | ICD-10-CM

## 2023-01-27 NOTE — PROGRESS NOTES
1  Screening for prostate cancer          Assessment and plan:       1  Prostate cancer screening  -  Normal digital rectal examination in the office today  -  PSA stable at 3 2  -Patient will continue annual prostate cancer screening with his primary care provider  We will follow with urology as needed and encouraged to contact us in the future with any urinary concerns  Sruthi Pham      Chief Complaint     New patient elevated PSA    History of Present Illness     Cristina Gonzalez is a 65 y o  male presenting for follow up of elevated PSA  Patient was undergoing routine prostate cancer screening with his primary care provider  His PSA was previously 3 0 (06/05/2019), 4 5 (10/01/2020), 2 7 (12/10/2020), 3 6 (7/2/2021), 3 5 (1/7/22) 3 2 (1/20/23)    Patient overall comfortable with LUTS  Not on any medications for his prostate or bladder  Denies any dysuria, gross hematuria, suprapubic pressure, flank pain, nausea, vomiting, fevers, or chills  Denies any history of urinary infections,  surgical manipulation, nor family history of prostate cancer  Medical comorbidities include hypertension, carpal tunnel, diverticulosis  Laboratory     Lab Results   Component Value Date    CREATININE 1 10 09/16/2022       Lab Results   Component Value Date    PSA 3 2 01/20/2023    PSA 3 5 01/07/2022    PSA 3 6 07/02/2021       Review of Systems     Review of Systems   Constitutional: Negative for activity change, appetite change, chills, diaphoresis, fatigue, fever and unexpected weight change  Respiratory: Negative for chest tightness and shortness of breath  Cardiovascular: Negative for chest pain, palpitations and leg swelling  Gastrointestinal: Negative for abdominal distention, abdominal pain, constipation, diarrhea, nausea and vomiting     Genitourinary: Negative for decreased urine volume, difficulty urinating, dysuria, enuresis, flank pain, frequency, genital sores, hematuria and urgency  Musculoskeletal: Negative for back pain, gait problem and myalgias  Skin: Negative for color change, pallor, rash and wound  Psychiatric/Behavioral: Negative for behavioral problems  The patient is not nervous/anxious  Allergies     Allergies   Allergen Reactions   • Penicillins Swelling     Reaction Date: [id number];    • Statins Confusion   • Tricor [Fenofibrate] Confusion       Physical Exam     Physical Exam  Constitutional:       General: He is not in acute distress  Appearance: Normal appearance  He is not ill-appearing  HENT:      Head: Normocephalic and atraumatic  Eyes:      General: No scleral icterus  Right eye: No discharge  Left eye: No discharge  Conjunctiva/sclera: Conjunctivae normal    Pulmonary:      Effort: Pulmonary effort is normal  No respiratory distress  Genitourinary:     Comments: Good rectal tone  Prostate 30g,  Smooth, symmetric, no palpable nodules  Skin:     General: Skin is warm  Coloration: Skin is not jaundiced  Neurological:      General: No focal deficit present  Mental Status: He is alert and oriented to person, place, and time     Psychiatric:         Mood and Affect: Mood normal          Behavior: Behavior normal          Vital Signs     Vitals:    01/27/23 0918   BP: 128/78   BP Location: Left arm   Patient Position: Sitting   Cuff Size: Large   Pulse: 82   Resp: 16   SpO2: 97%   Weight: 85 kg (187 lb 6 4 oz)   Height: 5' 5" (1 651 m)         Current Medications       Current Outpatient Medications:   •  B Complex Vitamins (VITAMIN B-COMPLEX PO), Take by mouth, Disp: , Rfl:   •  chlorthalidone 25 mg tablet, Take 1 tablet (25 mg total) by mouth daily, Disp: 90 tablet, Rfl: 1  •  CINNAMON PO, Take by mouth, Disp: , Rfl:   •  Glucosamine-Chondroit-Vit C-Mn (GLUCOSAMINE 1500 COMPLEX PO), Take by mouth, Disp: , Rfl:   •  Krill Oil 1000 MG CAPS, Take 1,000 mg by mouth 2 (two) times a day , Disp: , Rfl:   •  loratadine (CLARITIN) 10 mg tablet, Take 10 mg by mouth daily, Disp: , Rfl:   •  LYSINE PO, Take by mouth, Disp: , Rfl:   •  metoprolol tartrate (LOPRESSOR) 50 mg tablet, Take 1 tablet (50 mg total) by mouth 2 (two) times a day, Disp: 180 tablet, Rfl: 1  •  pantoprazole (PROTONIX) 40 mg tablet, Take 1 tablet (40 mg total) by mouth daily, Disp: 30 tablet, Rfl: 1  •  Red Yeast Rice 500 MG/0 5GM POWD, Take by mouth, Disp: , Rfl:   •  VITAMIN E PO, Take by mouth, Disp: , Rfl:       Active Problems     Patient Active Problem List   Diagnosis   • Benign essential hypertension   • Motion sickness   • Mixed hyperlipidemia   • Seasonal allergies   • BMI 32 0-32 9,adult   • Carpal tunnel syndrome   • Cervical spondylosis without myelopathy   • Ulnar nerve abnormality   • Injury of tendon of rotator cuff   • Diverticulitis   • Statin intolerance   • Family history of hypertension         Past Medical History     Past Medical History:   Diagnosis Date   • Allergic    • Cervical spondylosis without myelopathy 06/28/2019   • Diverticulitis of colon    • Diverticulosis    • GERD (gastroesophageal reflux disease)    • Hiatal hernia    • Hyperlipidemia    • Hypertension    • Obesity          Surgical History     Past Surgical History:   Procedure Laterality Date   • COLONOSCOPY     • EGD     • HERNIA REPAIR Left 1997   • PILONIDAL CYST EXCISION           Family History     Family History   Problem Relation Age of Onset   • Diabetes Mother    • Hypertension Mother    • Coronary artery disease Mother    • Diabetes Father    • Hypertension Father    • Dementia Father    • Alzheimer's disease Father    • Colon cancer Father 79   • Hyperlipidemia Brother    • Allergies Son    • Diabetes Maternal Grandmother    • Prostate cancer Neg Hx    • Testicular cancer Neg Hx          Social History     Social History       Radiology

## 2023-02-06 ENCOUNTER — OFFICE VISIT (OUTPATIENT)
Dept: SLEEP CENTER | Facility: CLINIC | Age: 63
End: 2023-02-06

## 2023-02-06 VITALS
DIASTOLIC BLOOD PRESSURE: 80 MMHG | HEIGHT: 65 IN | BODY MASS INDEX: 31.16 KG/M2 | WEIGHT: 187 LBS | SYSTOLIC BLOOD PRESSURE: 160 MMHG | HEART RATE: 49 BPM

## 2023-02-06 DIAGNOSIS — R06.83 SNORING: ICD-10-CM

## 2023-02-06 DIAGNOSIS — R29.818 SUSPECTED SLEEP APNEA: Primary | ICD-10-CM

## 2023-02-06 NOTE — PROGRESS NOTES
Assessment/Plan:      1  Suspected sleep apnea  -     Home Study; Future    2  Snoring  -     Ambulatory Referral to Sleep Medicine  -     Home Study; Future  We reviewed his history in detail-he has a moderate to high risk for obstructive sleep apnea given reports of loud snoring, witnessed breathing pauses, male gender, his age  In addition he has a large neck circumference  To assess for obstructive sleep apnea, I have ordered a home sleep test   We reviewed the pathophysiology of obstructive sleep apnea, he has a large tongue with scalloping which may have contributed  If obstructive sleep apnea is identified, he has some trepidation about use of CPAP, he does not think he would tolerate it well  Therefore, we will schedule a follow-up visit after his test to review the results, unless his test showed severe obstructive sleep apnea in which case I would strongly recommend starting with CPAP first       Subjective:      Patient ID: Vicky Cardona is a 65 y o  male  Mr  Bryson Lema presents in initial consultation  He presents with a chief complaint of tiredness, has not had a sleep study before  The patient works as a  on a 9 AM-5 PM schedule, works from [employer]  He notes he was referred by his gastroenterologist after discussion about his snoring  He still goes to bed between 11 PM-1 AM, is asleep by 2 AM   He notes a tendency towards insomnia  Watches games and screen time before bed  Awakens at 8 AM   He has a lot of tossing and turning during the night    He is not refreshed when he wakes  He feels sleepy during the day, at the start of the day and after lunch  He rarely naps at 4 pm after working  [place] Sleepiness Scale score is 5  He denies drowsy driving  He is reported to snore by his wife  His wife has told him he seems to stop breathing, he is not aware  No choking/gasping    No AM headaches    He drinks 20 ounces of coffee a day, 12 ounces of soda once a day (diet Coke with lunch)  He occasionally drinks alcohol  He does not smoke cigarettes  No RLS, no dream enactment, no sleepwalking    [place] Sleepiness Scale:     Sitting and reading: Moderate chance of dozing  Watching TV: Would never doze  Sitting, inactive in a public place (e g  a theatre or a meeting): Would never doze  As a passenger in a car for an hour without a break: Would never doze  Lying down to rest in the afternoon when circumstances permit: Moderate chance of dozing  Sitting and talking to someone: Would never doze  Sitting quietly after a lunch without alcohol: Slight chance of dozing  In a car, while stopped for a few minutes in traffic: Would never doze  Total score: 5     The following portions of the patient's history were reviewed and updated as appropriate: allergies, current medications, past family history, past medical history, past social history, past surgical history, and problem list     Review of Systems   Constitutional:        Steady weight    HENT: Positive for postnasal drip  Negative for congestion and nosebleeds  Respiratory: Negative for cough, shortness of breath and wheezing  Cardiovascular: Negative for palpitations and leg swelling  Musculoskeletal: Positive for back pain (occ)  Allergic/Immunologic: Positive for environmental allergies (grass in spring, ragweed)  Neurological: Negative for headaches  Psychiatric/Behavioral: Negative for decreased concentration  The patient is not nervous/anxious  Objective:        /80 (BP Location: Left arm, Patient Position: Sitting, Cuff Size: Large)   Pulse (!) 49   Ht 5' 5" (1 651 m)   Wt 84 8 kg (187 lb)   BMI 31 12 kg/m²     Physical Exam  Constitutional:       Appearance: Normal appearance  HENT:      Head: Normocephalic and atraumatic        Nose:      Comments: Turbinate hypertrophy bilateral     Mouth/Throat:      Mouth: Mucous membranes are moist    Eyes:      Extraocular Movements: Extraocular movements intact  Pupils: Pupils are equal, round, and reactive to light  Cardiovascular:      Rate and Rhythm: Normal rate  Pulses: Normal pulses  Heart sounds: Normal heart sounds  Pulmonary:      Effort: Pulmonary effort is normal       Breath sounds: Normal breath sounds  Musculoskeletal:      Right lower leg: No edema  Left lower leg: No edema  Neurological:      Mental Status: He is alert  Psychiatric:         Mood and Affect: Mood normal          Behavior: Behavior normal          Thought Content:  Thought content normal          Judgment: Judgment normal         17 in neck circumference  mallampati 4 airway  Scalloping of tongue

## 2023-02-06 NOTE — PATIENT INSTRUCTIONS
It is very important to avoid driving while drowsy, this can be very dangerous or even cause serious injury or death  If sleepy, it is not safe to get behind the wheel  If you are driving and feels sleepy, it is very important to pull over right away  Even losing control of the car for a split second can be deadly  If you feel you cannot control when sleepiness occurs and cannot prevented, it is important to not drive at all until this improves  Please let me know if you experience this as it is very important  Nursing Support:  When: Monday through Friday 7A-5PM except holidays  Where: Our direct line is [phone]  If you are having a true emergency please call 911  In the event that the line is busy or it is after hours please leave a voice message and we will return your call  Please speak clearly, leaving your full name, birth date, best number to reach you and the reason for your call  Medication refills: We will need the name of the medication, the dosage, the ordering provider, whether you get a 30 or 90 day refill, and the pharmacy name and address  Medications will be ordered by the provider only  Nurses cannot call in prescriptions  Please allow 7 days for medication refills  Physician requested updates: If your provider requested that you call with an update after starting medication, please be ready to provide us the medication and dosage, what time you take your medication, the time you attempt to fall asleep, time you fall asleep, when you wake up, and what time you get out of bed  Sleep Study Results: We will contact you with sleep study results and/or next steps after the physician has reviewed your testing

## 2023-02-07 DIAGNOSIS — K21.00 GASTROESOPHAGEAL REFLUX DISEASE WITH ESOPHAGITIS, UNSPECIFIED WHETHER HEMORRHAGE: ICD-10-CM

## 2023-02-07 RX ORDER — PANTOPRAZOLE SODIUM 40 MG/1
40 TABLET, DELAYED RELEASE ORAL DAILY
Qty: 30 TABLET | Refills: 1 | Status: SHIPPED | OUTPATIENT
Start: 2023-02-07

## 2023-02-15 ENCOUNTER — OFFICE VISIT (OUTPATIENT)
Dept: GASTROENTEROLOGY | Facility: CLINIC | Age: 63
End: 2023-02-15

## 2023-02-15 VITALS
HEART RATE: 52 BPM | SYSTOLIC BLOOD PRESSURE: 148 MMHG | WEIGHT: 185 LBS | DIASTOLIC BLOOD PRESSURE: 85 MMHG | HEIGHT: 65 IN | BODY MASS INDEX: 30.82 KG/M2

## 2023-02-15 DIAGNOSIS — K57.90 DIVERTICULOSIS: ICD-10-CM

## 2023-02-15 DIAGNOSIS — K44.9 HIATAL HERNIA WITH GERD: ICD-10-CM

## 2023-02-15 DIAGNOSIS — K21.9 GASTROESOPHAGEAL REFLUX DISEASE WITHOUT ESOPHAGITIS: Primary | ICD-10-CM

## 2023-02-15 DIAGNOSIS — K63.5 HYPERPLASTIC COLONIC POLYP, UNSPECIFIED PART OF COLON: ICD-10-CM

## 2023-02-15 DIAGNOSIS — K21.9 HIATAL HERNIA WITH GERD: ICD-10-CM

## 2023-02-15 NOTE — PROGRESS NOTES
[facility] - Outpatient Follow-up Note  Vivian Bennett 65 y o  male MRN: [id number]  Encounter: [id number]          ASSESSMENT AND PLAN:      1  Gastroesophageal reflux disease without esophagitis  S/p recent EGD which shows 3 cm size hiatal hernia, multiple whitish plaque in the lower esophagus but biopsy came back negative for Candida esophagitis, biopsies negative for Zuñiga's esophagus  Gastric biopsy came back okay, no evidence of H  pylori infection, patient responded very well with pantoprazole 40 mg p o  daily  Long discussion with the patient about long-term PPI use and the side effect, will try to wean off from PPI, will start with pantoprazole 40 mg every alternate day and when he will due for refill will prescribe pantoprazole 20 mg daily    2  Hiatal hernia with GERD  Advised for avoid late dinner, small meal and strict antireflux diet, repeat EGD in 2 years    3  Hyperplastic colonic polyp, unspecified part of colon  Colonoscopy shows 1 colon polyp which was removed which was hyperplastic polyp, there was evidence of diverticulosis, patient had a 1 episode of diverticulitis, advised to continue with high-fiber diet and start using Metamucil 1 teaspoon nightly, repeat colonoscopy will be in 7 years    4   Diverticulosis  Continue with high-fiber diet    ______________________________________________________________________    SUBJECTIVE: Patient seen and examined, he come for follow-up after EGD and colonoscopy, he was referred to us for GERD related symptoms, episode of diverticulitis, EGD and colonoscopy was done, EGD shows 3 cm size hiatal hernia with evidence of reflux disease, EGD with biopsy result came back okay negative for Zuñiga's esophagus or Candida esophagitis, no evidence of H  pylori infection, colonoscopy with polypectomy biopsy result shows evidence of hyperplastic polyp, there are multiple small diverticula in the left colon, patient had a 1 episode of diverticulitis prior to COVID pandemic, he was treated with antibiotic, I had a long discussion with patient about high-fiber diet  He responded very well with pantoprazole 40 mg p o  daily, he has a multiple question about PPI which are answered      REVIEW OF SYSTEMS IS OTHERWISE NEGATIVE        Historical Information   Past Medical History:   Diagnosis Date   • Allergic    • Cervical spondylosis without myelopathy 06/28/2019   • Diverticulitis of colon    • Diverticulosis    • GERD (gastroesophageal reflux disease)    • Hiatal hernia    • Hyperlipidemia    • Hypertension    • Obesity      Past Surgical History:   Procedure Laterality Date   • COLONOSCOPY     • EGD     • HERNIA REPAIR Left 1997   • PILONIDAL CYST EXCISION       Social History   Social History     Substance and Sexual Activity   Alcohol Use Yes    Comment: rare     Social History     Substance and Sexual Activity   Drug Use Never     Social History     Tobacco Use   Smoking Status Former   • Types: Cigars   Smokeless Tobacco Never     Family History   Problem Relation Age of Onset   • Diabetes Mother    • Hypertension Mother    • Coronary artery disease Mother    • Diabetes Father    • Hypertension Father    • Dementia Father    • Alzheimer's disease Father    • Colon cancer Father 79   • Snoring Father    • Sleep apnea Brother    • Hyperlipidemia Brother    • Diabetes Maternal Grandmother    • Allergies Son    • Prostate cancer Neg Hx    • Testicular cancer Neg Hx        Meds/Allergies       Current Outpatient Medications:   •  B Complex Vitamins (VITAMIN B-COMPLEX PO)  •  chlorthalidone 25 mg tablet  •  CINNAMON PO  •  Glucosamine-Chondroit-Vit C-Mn (GLUCOSAMINE 1500 COMPLEX PO)  •  Krill Oil 1000 MG CAPS  •  loratadine (CLARITIN) 10 mg tablet  •  LYSINE PO  •  metoprolol tartrate (LOPRESSOR) 50 mg tablet  •  pantoprazole (PROTONIX) 40 mg tablet  •  Red Yeast Rice 500 MG/0 5GM POWD  •  VITAMIN E PO    Allergies   Allergen Reactions   • Penicillins Swelling     Reaction Date: [id number];    • Statins Confusion   • Tricor [Fenofibrate] Confusion           Objective     Blood pressure 148/85, pulse (!) 52, height 5' 5" (1 651 m), weight 83 9 kg (185 lb)  Body mass index is 30 79 kg/m²  PHYSICAL EXAM:      General Appearance:   Alert, cooperative, no distress   HEENT:   Normocephalic, atraumatic, anicteric      Neck:  Supple, symmetrical, trachea midline   Lungs:   Clear to auscultation bilaterally; no rales, rhonchi or wheezing; respirations unlabored    Heart[de-identified]   Regular rate and rhythm; no murmur, rub, or gallop  Abdomen:   Soft, non-tender, non-distended; normal bowel sounds; no masses, no organomegaly    Genitalia:   Deferred    Rectal:   Deferred    Extremities:  No cyanosis, clubbing or edema    Pulses:  2+ and symmetric    Skin:  No jaundice, rashes, or lesions    Lymph nodes:  No palpable cervical lymphadenopathy        Lab Results:   No visits with results within 1 Day(s) from this visit  Latest known visit with results is:   Orders Only on 01/20/2023   Component Date Value   • Prostate Specific Antige* 01/20/2023 3 2          Radiology Results:   No results found

## 2023-03-02 ENCOUNTER — HOSPITAL ENCOUNTER (OUTPATIENT)
Dept: SLEEP CENTER | Facility: CLINIC | Age: 63
Discharge: HOME | End: 2023-03-02

## 2023-03-02 DIAGNOSIS — R29.818 SUSPECTED SLEEP APNEA: ICD-10-CM

## 2023-03-02 DIAGNOSIS — R06.83 SNORING: ICD-10-CM

## 2023-03-03 DIAGNOSIS — G47.33 OBSTRUCTIVE SLEEP APNEA: Primary | ICD-10-CM

## 2023-03-03 NOTE — PROGRESS NOTES
Home Sleep Study Documentation    HOME STUDY DEVICE: Noxturnal no                                           Marlin G3 yes      Pre-Sleep Home Study:    Set-up and instructions performed by: MA HST Tech    Technician performed demonstration for Patient: yes    Return demonstration performed by Patient: yes    Written instructions provided to Patient: yes    Patient signed consent form: yes        Post-Sleep Home Study:    Additional comments by Patient: None    Home Sleep Study Failed:no:    Failure reason: N/A    Reported or Detected: N/A    Scored by: NETTIE Maravilla

## 2023-03-09 ENCOUNTER — TELEPHONE (OUTPATIENT)
Dept: SLEEP CENTER | Facility: CLINIC | Age: 63
End: 2023-03-09

## 2023-03-09 NOTE — TELEPHONE ENCOUNTER
Sleep study resulted and shows severe CECILIA  APAP ordered  Study results provided to patient by Dr Varinder Pastrana via [id number] [email or web address] message on 3/6/23  Left message for patient to call office to schedule DME set up and compliance follow up appointments  Phone number provided in message

## 2023-03-13 NOTE — TELEPHONE ENCOUNTER
DME set up is scheduled 4/3/23 Tay  Rx for CPAP and clinical information sent to [facility] via [facility]

## 2023-03-14 LAB
DME PARACHUTE DELIVERY DATE REQUESTED: NORMAL
DME PARACHUTE DELIVERY NOTE: NORMAL
DME PARACHUTE ITEM DESCRIPTION: NORMAL
DME PARACHUTE ORDER STATUS: NORMAL
DME PARACHUTE SUPPLIER NAME: NORMAL
DME PARACHUTE SUPPLIER PHONE: NORMAL

## 2023-03-25 LAB
ALBUMIN SERPL-MCNC: 4.5 G/DL (ref 3.8–4.8)
ALBUMIN/CREAT UR: 5 MG/G CREAT (ref 0–29)
ALBUMIN/GLOB SERPL: 1.9 {RATIO} (ref 1.2–2.2)
ALP SERPL-CCNC: 52 IU/L (ref 44–121)
ALT SERPL-CCNC: 26 IU/L (ref 0–44)
AST SERPL-CCNC: 26 IU/L (ref 0–40)
BASOPHILS # BLD AUTO: 0.1 X10E3/UL (ref 0–0.2)
BASOPHILS NFR BLD AUTO: 2 %
BILIRUB SERPL-MCNC: 0.8 MG/DL (ref 0–1.2)
BUN SERPL-MCNC: 19 MG/DL (ref 8–27)
BUN/CREAT SERPL: 17 (ref 10–24)
CALCIUM SERPL-MCNC: 9.7 MG/DL (ref 8.6–10.2)
CHLORIDE SERPL-SCNC: 97 MMOL/L (ref 96–106)
CHOLEST SERPL-MCNC: 203 MG/DL (ref 100–199)
CO2 SERPL-SCNC: 33 MMOL/L (ref 20–29)
CREAT SERPL-MCNC: 1.15 MG/DL (ref 0.76–1.27)
CREAT UR-MCNC: 271.9 MG/DL
EGFR: 72 ML/MIN/1.73
EOSINOPHIL # BLD AUTO: 0.1 X10E3/UL (ref 0–0.4)
EOSINOPHIL NFR BLD AUTO: 3 %
ERYTHROCYTE [DISTWIDTH] IN BLOOD BY AUTOMATED COUNT: 13.6 % (ref 11.6–15.4)
GLOBULIN SER-MCNC: 2.4 G/DL (ref 1.5–4.5)
GLUCOSE SERPL-MCNC: 106 MG/DL (ref 70–99)
HCT VFR BLD AUTO: 49.3 % (ref 37.5–51)
HDLC SERPL-MCNC: 44 MG/DL
HGB BLD-MCNC: 17.6 G/DL (ref 13–17.7)
IMM GRANULOCYTES # BLD: 0 X10E3/UL (ref 0–0.1)
IMM GRANULOCYTES NFR BLD: 0 %
LDLC SERPL CALC-MCNC: 129 MG/DL (ref 0–99)
LYMPHOCYTES # BLD AUTO: 1.9 X10E3/UL (ref 0.7–3.1)
LYMPHOCYTES NFR BLD AUTO: 37 %
MCH RBC QN AUTO: 30.2 PG (ref 26.6–33)
MCHC RBC AUTO-ENTMCNC: 35.7 G/DL (ref 31.5–35.7)
MCV RBC AUTO: 85 FL (ref 79–97)
MICROALBUMIN UR-MCNC: 14.7 UG/ML
MONOCYTES # BLD AUTO: 0.4 X10E3/UL (ref 0.1–0.9)
MONOCYTES NFR BLD AUTO: 8 %
NEUTROPHILS # BLD AUTO: 2.6 X10E3/UL (ref 1.4–7)
NEUTROPHILS NFR BLD AUTO: 50 %
PLATELET # BLD AUTO: 219 X10E3/UL (ref 150–450)
POTASSIUM SERPL-SCNC: 3.3 MMOL/L (ref 3.5–5.2)
PROT SERPL-MCNC: 6.9 G/DL (ref 6–8.5)
RBC # BLD AUTO: 5.82 X10E6/UL (ref 4.14–5.8)
SL AMB VLDL CHOLESTEROL CALC: 30 MG/DL (ref 5–40)
SODIUM SERPL-SCNC: 145 MMOL/L (ref 134–144)
TRIGL SERPL-MCNC: 171 MG/DL (ref 0–149)
TSH SERPL DL<=0.005 MIU/L-ACNC: 1.18 UIU/ML (ref 0.45–4.5)
WBC # BLD AUTO: 5.1 X10E3/UL (ref 3.4–10.8)

## 2023-03-31 ENCOUNTER — OFFICE VISIT (OUTPATIENT)
Dept: FAMILY MEDICINE CLINIC | Facility: CLINIC | Age: 63
End: 2023-03-31

## 2023-03-31 VITALS
DIASTOLIC BLOOD PRESSURE: 70 MMHG | WEIGHT: 183 LBS | HEIGHT: 65 IN | RESPIRATION RATE: 16 BRPM | SYSTOLIC BLOOD PRESSURE: 110 MMHG | OXYGEN SATURATION: 99 % | BODY MASS INDEX: 30.49 KG/M2 | HEART RATE: 62 BPM

## 2023-03-31 DIAGNOSIS — G47.33 OBSTRUCTIVE SLEEP APNEA: ICD-10-CM

## 2023-03-31 DIAGNOSIS — J30.2 SEASONAL ALLERGIES: ICD-10-CM

## 2023-03-31 DIAGNOSIS — R97.20 ELEVATED PSA: ICD-10-CM

## 2023-03-31 DIAGNOSIS — E78.2 MIXED HYPERLIPIDEMIA: ICD-10-CM

## 2023-03-31 DIAGNOSIS — Z82.49 FAMILY HISTORY OF HYPERTENSION: ICD-10-CM

## 2023-03-31 DIAGNOSIS — Z78.9 STATIN INTOLERANCE: ICD-10-CM

## 2023-03-31 DIAGNOSIS — I10 BENIGN ESSENTIAL HYPERTENSION: ICD-10-CM

## 2023-03-31 DIAGNOSIS — R06.83 SNORING: ICD-10-CM

## 2023-03-31 DIAGNOSIS — Z23 NEED FOR PNEUMOCOCCAL VACCINATION: ICD-10-CM

## 2023-03-31 DIAGNOSIS — Z00.00 ANNUAL PHYSICAL EXAM: Primary | ICD-10-CM

## 2023-03-31 LAB
DME PARACHUTE DELIVERY DATE EXPECTED: NORMAL
DME PARACHUTE DELIVERY DATE REQUESTED: NORMAL
DME PARACHUTE DELIVERY NOTE: NORMAL
DME PARACHUTE ITEM DESCRIPTION: NORMAL
DME PARACHUTE ORDER STATUS: NORMAL
DME PARACHUTE SUPPLIER NAME: NORMAL
DME PARACHUTE SUPPLIER PHONE: NORMAL

## 2023-03-31 NOTE — PROGRESS NOTES
Assessment/Plan:   Diagnoses and all orders for this visit:    Annual physical exam  - reviewed PMHx, PSHx, meds, allergies, FHx, Soc Hx and Sexual Hx   - discussed diet and encouraged exercise - see below   - reviewed labs done 3/24/2023 - see below   - UTD with Tdap   - UTD with COVID IMMs and Booster x3  - UTD with annual Flu vaccine  - received PCV20 in the office today   - declined STD screening in the office today   - UTD with EGD/Cscope 12/2022 - repeat EGD in 2yrs (2024) and Cscope in 7yrs (2029)  - UTD with annual screening PSA - per Uro has been stable and can follow annually with PCP   - UTD with Optho and Dental   - RTO in 1yr for annual exam - pt aware and agreeable     Benign essential hypertension  Family history of hypertension  - BP in the office 110/70 on my repeat   - currently on Lopressor 50mg BID and Chlorthalidone 25mg QD   - does note some lightheadedness when getting up in the AM - for now, advised to STOP Chlorthalidone 25mg QD and RTO in 2-3wks for BP check - pt aware and agreeable   - nml renal function and urine microalbumin   - UTD with COVID IMMs and Boosters x3   - UTD with annual Flu vaccine  - received PCV20 in the office today   - (+) FHx of HTN in both parents   - RTO in 2-3wks for f/u - pt aware and agreeable   Need for pneumococcal vaccination  -     Pneumococcal Conjugate Vaccine 20-valent (Pcv20)    Mixed hyperlipidemia  Statin intolerance  - unable to tolerate statins or tricor 2/2 confusion   - currently using Fish Oil and Red Yeast Rice   - Lipids (3/24/2023): , , HDL 44,    - has not been exercising regularly and with sedentary job   - diet/exercise  - The 10-year ASCVD risk score (Zan CAVAZOS, et al , 2019) is: 9 3%    Values used to calculate the score:      Age: 65 years      Sex: Male      Is Non- : No      Diabetic: No      Tobacco smoker: No      Systolic Blood Pressure: 430 mmHg      Is BP treated: Yes      HDL Cholesterol: 44 mg/dL      Total Cholesterol: 203 mg/dL    BMI 30 0-30 9,adult  - BMI 30 5  - discussed diet and encouraged exercise  - educated that it takes 3500 abelardo to lose 1lb  - advised to cut down on carbs, 5 servings of fruits/veggies/day, increase water intake (65-80oz/water/day)   - appropriate weight loss goal for men = 1-2lb/week  - per the Heart Association - 150mins/exercise/week, but to lose and maintain weight 200-300mins/exercise/week   - pt declined referral to [facility]     Elevated PSA  - per Uro in 1/27/2023 - stable PSA and follow annually with PCP    Obstructive sleep apnea  Snoring  - follows with Sleep Med and d/w severe CECILIA - CPAP ordered and pt will  machine on Monday     Seasonal allergies  - advised Zyrtec QAM, Mucinex QHS, Flonase and cool mist humidifier in room           Subjective:    Patient ID: Napoleon Hernández is a 65 y o  male    Napoleon Hernández is a 65 y o  male who presents to the office for an annual exam and f/u   1) Annual   - PMHx: HTN, HL, diverticulosis (diverticulitis in 2019), seasonal allergies, BMI 30 5 <-- 33 3, elevated PSA (per Uro in 1/2023 - stable PSA and follow annually with PCP), severe CECILIA - CPAP  - Specialists: Uro, GI, Sleep Med   - allergies: PCN - swelling, Statins/Tricor - confusion   - Meds: see med rec   - PSHx: hernia repair   - FHx: M (DM, HTN, CAD), F (HTN, Alzheimer's, Colon Ca - 72yo), B (HL), denies FHx of prostate/testicular ca   - Immunizations: UTD with Tdap, UTD with COVID IMMs and Boosters x3, UTD with annual Flu vaccine, due for PCV20 and will get in office today   - Hm: EGD/Cscope 12/2022 - repeat EGD in 2yrs and Cscope in 7yrs  - diet/exercise: sporadically exercising, diet: not a lot of red meat, some egg intake weekly   - social: denies tob/illicits, social EtOH (1x/month), works [employer]  - sexual Hx: active with spouse, declined STD screening   - last vision: wears glasses, last Optho (Dr Olive Isabel) annually   - last "dental: [address]  - goes [id number]   - reviewed labs done 3/24/2023  2) HTN  - BP in the office 124/70 and same on my repeat 110/70   - currently on Lopressor 50mg BID and Chlorthalidone 25mg QD   - does note some lightheadedness when getting up in the AM    - nml renal function and urine microalbumin   - (+) FHx of HTN in both parents   - no \"head humming\"   - today in the office pt denies F/C/N/V/HA/visual changes/CP/palpitations/SOB/wheezing/abd pain/D/C/urinary incontinence/LE edema  3) HL   - unable to tolerate statins or tricor 2/2 confusion   - currently using Fish Oil and Red Yeast Rice   - Lipids (3/24/2023): , , HDL 44,    - has not been exercising regularly and with sedentary job   - diet as noted above   4) Seasonal Allergies          The following portions of the patient's history were reviewed and updated as appropriate: allergies, current medications, past family history, past medical history, past social history, past surgical history and problem list     Review of Systems  as per HPI    Objective:  /70   Pulse 62   Resp 16   Ht 5' 5\" (1 651 m)   Wt 83 kg (183 lb)   SpO2 99%   BMI 30 45 kg/m²    Physical Exam  Vitals reviewed  Constitutional:       General: He is not in acute distress  Appearance: Normal appearance  He is not ill-appearing, toxic-appearing or diaphoretic  HENT:      Head: Normocephalic and atraumatic  Right Ear: External ear normal       Left Ear: External ear normal    Eyes:      General: No scleral icterus  Right eye: No discharge  Left eye: No discharge  Extraocular Movements: Extraocular movements intact  Conjunctiva/sclera: Conjunctivae normal    Cardiovascular:      Rate and Rhythm: Normal rate  Heart sounds: Normal heart sounds  Pulmonary:      Effort: Pulmonary effort is normal  No respiratory distress  Breath sounds: Normal breath sounds  No stridor  No wheezing, rhonchi or rales   " Abdominal:      Palpations: Abdomen is soft  Musculoskeletal:         General: Normal range of motion  Cervical back: Normal range of motion  Right lower leg: No edema  Left lower leg: No edema  Skin:     General: Skin is warm  Neurological:      General: No focal deficit present  Mental Status: He is alert and oriented to person, place, and time  Psychiatric:         Mood and Affect: Mood normal          Behavior: Behavior normal          BMI Counseling: Body mass index is 30 45 kg/m²  The BMI is above normal  Nutrition recommendations include 3-5 servings of fruits/vegetables daily  Exercise recommendations include exercising 3-5 times per week  Depression Screening Follow-up Plan: Patient's depression screening was positive with a PHQ-2 score of 0  Their PHQ-9 score was   Clinically patient does not have depression  No treatment is required

## 2023-03-31 NOTE — PROGRESS NOTES
Assessment/Plan:   Diagnoses and all orders for this visit:      Benign essential hypertension  Family history of hypertension  - BP in the office 110/70 on my repeat   - currently on Lopressor 50mg BID and Chlorthalidone 25mg QD   - does note some lightheadedness when getting up in the AM - for now, advised to STOP Chlorthalidone 25mg QD and RTO in 2-3wks for BP check - pt aware and agreeable   - nml renal function and urine microalbumin   - UTD with COVID IMMs and Boosters x3   - UTD with annual Flu vaccine  - received PCV20 in the office today   - (+) FHx of HTN in both parents   - RTO in 2-3wks for f/u - pt aware and agreeable   Need for pneumococcal vaccination  -     Pneumococcal Conjugate Vaccine 20-valent (Pcv20)    Mixed hyperlipidemia  Statin intolerance  - unable to tolerate statins or tricor 2/2 confusion   - currently using Fish Oil and Red Yeast Rice   - Lipids (3/24/2023): , , HDL 44,    - has not been exercising regularly and with sedentary job   - diet/exercise  - The 10-year ASCVD risk score (Zan CAVAZOS, et al , 2019) is: 9 3%    Values used to calculate the score:      Age: 65 years      Sex: Male      Is Non- : No      Diabetic: No      Tobacco smoker: No      Systolic Blood Pressure: 612 mmHg      Is BP treated: Yes      HDL Cholesterol: 44 mg/dL      Total Cholesterol: 203 mg/dL    BMI 30 0-30 9,adult  - BMI 30 5  - discussed diet and encouraged exercise  - educated that it takes 3500 abelardo to lose 1lb  - advised to cut down on carbs, 5 servings of fruits/veggies/day, increase water intake (65-80oz/water/day)   - appropriate weight loss goal for men = 1-2lb/week  - per the Heart Association - 150mins/exercise/week, but to lose and maintain weight 200-300mins/exercise/week   - pt declined referral to [facility]     Elevated PSA  - per Uro in 1/27/2023 - stable PSA and follow annually with PCP    Obstructive sleep apnea  Snoring  - "follows with Sleep Med and d/w severe CECILIA - CPAP ordered and pt will  machine on Monday     Seasonal allergies  - advised Zyrtec QAM, Mucinex QHS, Flonase and cool mist humidifier in room           Subjective:    Patient ID: Miky Ferrer is a 65 y o  male    Miky Ferrer is a 65 y o  male who presents to the office for an annual exam and f/u   1) Annual   - PMHx: HTN, HL, diverticulosis (diverticulitis in 2019), seasonal allergies, BMI 30 5 <-- 33 3, elevated PSA (per Uro in 1/2023 - stable PSA and follow annually with PCP), severe CECILIA - CPAP  - Specialists: Uro, GI, Sleep Med   - allergies: PCN - swelling, Statins/Tricor - confusion   - Meds: see med rec   - PSHx: hernia repair   - FHx: M (DM, HTN, CAD), F (HTN, Alzheimer's, Colon Ca - 72yo), B (HL), denies FHx of prostate/testicular ca   - Immunizations: UTD with Tdap, UTD with COVID IMMs and Boosters x3, UTD with annual Flu vaccine, due for PCV20 and will get in office today   - Hm: EGD/Cscope 12/2022 - repeat EGD in 2yrs and Cscope in 7yrs  - diet/exercise: sporadically exercising, diet: not a lot of red meat, some egg intake weekly   - social: denies tob/illicits, social EtOH (1x/month), works [employer]  - sexual Hx: active with spouse, declined STD screening   - last vision: wears glasses, last Optho (Dr Barrientos Junes) annually   - last dental: [address]  - goes [id number]   - reviewed labs done 3/24/2023  2) HTN  - BP in the office 124/70 and same on my repeat 110/70   - currently on Lopressor 50mg BID and Chlorthalidone 25mg QD   - does note some lightheadedness when getting up in the AM    - nml renal function and urine microalbumin   - (+) FHx of HTN in both parents   - no \"head humming\"   - today in the office pt denies F/C/N/V/HA/visual changes/CP/palpitations/SOB/wheezing/abd pain/D/C/urinary incontinence/LE edema  3) HL   - unable to tolerate statins or tricor 2/2 confusion   - currently using Fish Oil and Red Yeast Rice   - Lipids " "(3/24/2023): , , HDL 44,    - has not been exercising regularly and with sedentary job   - diet as noted above   4) Seasonal Allergies          The following portions of the patient's history were reviewed and updated as appropriate: allergies, current medications, past family history, past medical history, past social history, past surgical history and problem list     Review of Systems  as per HPI    Objective:  /70   Pulse 62   Resp 16   Ht 5' 5\" (1 651 m)   Wt 83 kg (183 lb)   SpO2 99%   BMI 30 45 kg/m²    Physical Exam  Vitals reviewed  Constitutional:       General: He is not in acute distress  Appearance: Normal appearance  He is not ill-appearing, toxic-appearing or diaphoretic  HENT:      Head: Normocephalic and atraumatic  Right Ear: External ear normal       Left Ear: External ear normal    Eyes:      General: No scleral icterus  Right eye: No discharge  Left eye: No discharge  Extraocular Movements: Extraocular movements intact  Conjunctiva/sclera: Conjunctivae normal    Cardiovascular:      Rate and Rhythm: Normal rate  Heart sounds: Normal heart sounds  Pulmonary:      Effort: Pulmonary effort is normal  No respiratory distress  Breath sounds: Normal breath sounds  No stridor  No wheezing, rhonchi or rales  Abdominal:      Palpations: Abdomen is soft  Musculoskeletal:         General: Normal range of motion  Cervical back: Normal range of motion  Right lower leg: No edema  Left lower leg: No edema  Skin:     General: Skin is warm  Neurological:      General: No focal deficit present  Mental Status: He is alert and oriented to person, place, and time  Psychiatric:         Mood and Affect: Mood normal          Behavior: Behavior normal          BMI Counseling: Body mass index is 30 45 kg/m²  The BMI is above normal  Nutrition recommendations include 3-5 servings of fruits/vegetables daily   " Exercise recommendations include exercising 3-5 times per week  Depression Screening Follow-up Plan: Patient's depression screening was positive with a PHQ-2 score of 0  Their PHQ-9 score was   Clinically patient does not have depression  No treatment is required

## 2023-04-03 LAB
DME PARACHUTE DELIVERY DATE ACTUAL: NORMAL
DME PARACHUTE DELIVERY DATE EXPECTED: NORMAL
DME PARACHUTE DELIVERY DATE REQUESTED: NORMAL
DME PARACHUTE DELIVERY NOTE: NORMAL
DME PARACHUTE ITEM DESCRIPTION: NORMAL
DME PARACHUTE ORDER STATUS: NORMAL
DME PARACHUTE SUPPLIER NAME: NORMAL
DME PARACHUTE SUPPLIER PHONE: NORMAL

## 2023-05-01 DIAGNOSIS — K21.9 GASTROESOPHAGEAL REFLUX DISEASE WITHOUT ESOPHAGITIS: Primary | ICD-10-CM

## 2023-05-01 RX ORDER — PANTOPRAZOLE SODIUM 20 MG/1
20 TABLET, DELAYED RELEASE ORAL DAILY
Qty: 30 TABLET | Refills: 2 | Status: SHIPPED | OUTPATIENT
Start: 2023-05-01

## 2023-05-04 ENCOUNTER — OFFICE VISIT (OUTPATIENT)
Dept: SLEEP CENTER | Facility: CLINIC | Age: 63
End: 2023-05-04

## 2023-05-04 VITALS
DIASTOLIC BLOOD PRESSURE: 92 MMHG | HEIGHT: 65 IN | BODY MASS INDEX: 31.99 KG/M2 | WEIGHT: 192 LBS | SYSTOLIC BLOOD PRESSURE: 133 MMHG | HEART RATE: 59 BPM

## 2023-05-04 DIAGNOSIS — G47.33 OSA (OBSTRUCTIVE SLEEP APNEA): Primary | ICD-10-CM

## 2023-05-04 NOTE — PATIENT INSTRUCTIONS
If you do not like the air pressure change on your machine, please contact me through the patient portal   Sometimes it may take a few days to get used to the new air pressure  It is very important to avoid driving while drowsy, this can be very dangerous or even cause serious injury or death  If sleepy, it is not safe to get behind the wheel  If you are driving and feels sleepy, it is very important to pull over right away  Even losing control of the car for a split second can be deadly  If you feel you cannot control when sleepiness occurs and cannot prevented, it is important to not drive at all until this improves  Please let me know if you experience this as it is very important  Nursing Support:  When: Monday through Friday 7A-5PM except holidays  Where: Our direct line is [phone]  If you are having a true emergency please call 911  In the event that the line is busy or it is after hours please leave a voice message and we will return your call  Please speak clearly, leaving your full name, birth date, best number to reach you and the reason for your call  Medication refills: We will need the name of the medication, the dosage, the ordering provider, whether you get a 30 or 90 day refill, and the pharmacy name and address  Medications will be ordered by the provider only  Nurses cannot call in prescriptions  Please allow 7 days for medication refills  Physician requested updates: If your provider requested that you call with an update after starting medication, please be ready to provide us the medication and dosage, what time you take your medication, the time you attempt to fall asleep, time you fall asleep, when you wake up, and what time you get out of bed  Sleep Study Results: We will contact you with sleep study results and/or next steps after the physician has reviewed your testing

## 2023-05-04 NOTE — PROGRESS NOTES
Assessment/Plan:    1  CECILIA (obstructive sleep apnea)  -     PAP DME Pressure Change      We reviewed his sleep study which shows very severe obstructive sleep apnea  Certainly treatment is important and overall he is doing well with CPAP use  In analyzing his data, it is possible higher pressure settings are required, I will therefore make a change to the range of 7-17 cm H2O  I asked him to contact me if this change is uncomfortable  Otherwise I will see him back in 6 months  We reviewed the importance of weight loss, with significant weight loss sleep apnea can improve or resolve  Subjective:      Patient ID: Clement Win is a 59 y o  male  HPI    This is a 81-year-old male who returns in a follow-up visit  I initially assessed him in February, he had presented with a chief complaint of tiredness as well as snoring  A home sleep test was completed in March 2023, this test showed severe obstructive sleep apnea with a respiratory event index of 30  CPAP was ordered for home use, this is his first follow-up since receiving a machine at home  He is feeling better with CPAP no longer snores, no significant congestion or dryness, no nose bleeds     He has been going to bed at 11:30 PM, asleep in 1 hour, awake at 730-8 AM   He sleeps 7-8 hours a night  Not waking up during the night       Emmett Sleepiness Scale score is 4 today, this is improved from his previous visit when it was 5  CPAP data reviewed today  AirSense 11 set at 5-15 cm H2O  95% pressure-13 cm H2O  Usage-7 hours 2 minutes, used 30 out of 30 days  AHI 4 2, central apnea index 1 3, obstructive apnea index 1 9\    Emmett Sleepiness Scale  Sitting and reading: Slight chance of dozing  Watching TV: Would never doze  Sitting, inactive in a public place (e g  a theatre or a meeting):  Would never doze  As a passenger in a car for an hour without a break: Would never doze  Lying down to rest in the afternoon when circumstances "permit: Moderate chance of dozing  Sitting and talking to someone: Would never doze  Sitting quietly after a lunch without alcohol: Slight chance of dozing  In a car, while stopped for a few minutes in traffic: Would never doze  Total score: 4      Review of Systems    As above  Objective:      /92 (BP Location: Left arm, Patient Position: Sitting, Cuff Size: Adult)   Pulse 59   Ht 5' 5\" (1 651 m)   Wt 87 1 kg (192 lb)   BMI 31 95 kg/m²          Physical Exam    RRR  Lungs CTA b/l   "

## 2023-05-05 ENCOUNTER — TELEPHONE (OUTPATIENT)
Dept: SLEEP CENTER | Facility: CLINIC | Age: 63
End: 2023-05-05

## 2023-05-05 LAB
DME PARACHUTE DELIVERY DATE REQUESTED: NORMAL
DME PARACHUTE ITEM DESCRIPTION: NORMAL
DME PARACHUTE ORDER STATUS: NORMAL
DME PARACHUTE SUPPLIER NAME: NORMAL
DME PARACHUTE SUPPLIER PHONE: NORMAL

## 2023-07-12 ENCOUNTER — OFFICE VISIT (OUTPATIENT)
Dept: URGENT CARE | Facility: CLINIC | Age: 63
End: 2023-07-12
Payer: COMMERCIAL

## 2023-07-12 VITALS — OXYGEN SATURATION: 99 % | SYSTOLIC BLOOD PRESSURE: 138 MMHG | HEART RATE: 62 BPM | DIASTOLIC BLOOD PRESSURE: 76 MMHG

## 2023-07-12 DIAGNOSIS — S82.832A CLOSED FRACTURE OF DISTAL END OF LEFT FIBULA, UNSPECIFIED FRACTURE MORPHOLOGY, INITIAL ENCOUNTER: Primary | ICD-10-CM

## 2023-07-12 NOTE — PATIENT INSTRUCTIONS
You have been placed in a boot or splint because you have a fracture or suspected fracture to your lower extremity. This needs to be worn at all times until you follow-up with an orthopedic specialist, and needs to be kept clean and dry. Cover splint with 2 layers of plastic and or an extremity bag when showering. You may need assistance with dressing and showering as you are not to put any weight on your leg. You should remain non-weight bearing in the boot or splint until you follow-up with an orthopedic specialist. This means do not put any weight on your foot. Crutches will be supplied to you to assist you in non-weight bearing. See Below attached instructions for how to use crutches appropriately. Follow-up with orthopedics sometime in the next week. Referral has been provided for you. Take Ibuprofen 600-800 mg as needed for pain and swelling. You may supplement with Tylenol 1,000 mg every 6-8 hours as well. It is recommended that you alternate between the two medications every 4 hours. Do not take Ibuprofen if you have a history of peptic ulcer disease, kidney disease, or you have undergone gastric bypass surgery. Do not take Tylenol if you have a history of liver disease. Ice and elevate as much as possible. Try to ice for at least 20 minutes once and hour. Do not apply ice for longer than 1 hour duration and allow at least 20 minutes between icing sessions. If you have significant increased swelling, increased pain not controlled with your medications, or develop numbness or tingling in your toes, loosen your boot or the wrap on your splint, but do not remove them. If symptoms do not improve in the next 20-30 minutes after loosening the wrap or boot, report to the nearest emergency room. These are symptoms of compartment syndrome and if not addressed quickly can result in the loss of part of your limb.

## 2023-07-12 NOTE — PROGRESS NOTES
[facility]        NAME: Jaxson Bryan is a 59 y.o. male  : 1960    MRN: [id number]  DATE: 2023  TIME: 7:48 PM    Assessment and Plan   Closed fracture of distal end of left fibula, unspecified fracture morphology, initial encounter [S82.832A]  1. Closed fracture of distal end of left fibula, unspecified fracture morphology, initial encounter  XR ankle 3+ vw left    Ambulatory referral to Orthopedic Surgery    CANCELED: Ambulatory referral to Orthopedic Surgery      Presents with acute injury of the ankle with inability to weight-bear. Recommend x-ray for further evaluation. X-ray demonstrates acute fracture to the lateral malleolus with torus appearance above the talotibial joint. He is placed in a cam boot nonweightbearing on crutches. Discussed RICE modalities. He should wear the boot at all times until follow-up with orthopedics. Pubic referral provided for definitive treatment and management. Patient Instructions     Patient Instructions   You have been placed in a boot or splint because you have a fracture or suspected fracture to your lower extremity. This needs to be worn at all times until you follow-up with an orthopedic specialist, and needs to be kept clean and dry. Cover splint with 2 layers of plastic and or an extremity bag when showering. You may need assistance with dressing and showering as you are not to put any weight on your leg. You should remain non-weight bearing in the boot or splint until you follow-up with an orthopedic specialist. This means do not put any weight on your foot. Crutches will be supplied to you to assist you in non-weight bearing. See Below attached instructions for how to use crutches appropriately. Follow-up with orthopedics sometime in the next week. Referral has been provided for you. Take Ibuprofen 600-800 mg as needed for pain and swelling. You may supplement with Tylenol 1,000 mg every 6-8 hours as well.  It is recommended that you alternate between the two medications every 4 hours. Do not take Ibuprofen if you have a history of peptic ulcer disease, kidney disease, or you have undergone gastric bypass surgery. Do not take Tylenol if you have a history of liver disease. Ice and elevate as much as possible. Try to ice for at least 20 minutes once and hour. Do not apply ice for longer than 1 hour duration and allow at least 20 minutes between icing sessions. If you have significant increased swelling, increased pain not controlled with your medications, or develop numbness or tingling in your toes, loosen your boot or the wrap on your splint, but do not remove them. If symptoms do not improve in the next 20-30 minutes after loosening the wrap or boot, report to the nearest emergency room. These are symptoms of compartment syndrome and if not addressed quickly can result in the loss of part of your limb. Follow up with PCP in 3-5 days. Proceed to  ER if symptoms worsen. Chief Complaint     Chief Complaint   Patient presents with   • Ankle Injury     Slipped and rolled left ankle while cutting the grass, pt reports "feeling a pop" and hearing a click". Iced while in waiting area, came in via wheelchair, able to bear weight with just standing, but unable to take steps/move left ankle without pain. Pain does not radiate per patient, localized lateral part of ankle, no numbness/tingling in toes. Unable to flex/extend. History of Present Illness       28-year-old male presents with complaint of left ankle pain. Patient reports that approximately 5:30 PM this he was mowing his lawn when he inverted his ankle and twisted it been having pain since that time. Patient reports that he can put weight on it when he is stationary but is unable to ambulate on the ankle. He denies any numbness or tingling distally. Notes inability to range the ankle due to pain. Denies any prior injuries.       Review of Systems   Review of Systems   Musculoskeletal: Positive for arthralgias, gait problem and myalgias.          Current Medications       Current Outpatient Medications:   •  B Complex Vitamins (VITAMIN B-COMPLEX PO), Take by mouth, Disp: , Rfl:   •  Glucosamine-Chondroit-Vit C-Mn (GLUCOSAMINE 1500 COMPLEX PO), Take by mouth, Disp: , Rfl:   •  Krill Oil 1000 MG CAPS, Take 1,000 mg by mouth 2 (two) times a day , Disp: , Rfl:   •  LYSINE PO, Take by mouth, Disp: , Rfl:   •  metoprolol tartrate (LOPRESSOR) 50 mg tablet, TAKE 1 TABLET BY MOUTH  TWICE DAILY, Disp: 180 tablet, Rfl: 0  •  pantoprazole (PROTONIX) 20 mg tablet, Take 1 tablet (20 mg total) by mouth daily, Disp: 30 tablet, Rfl: 2  •  Red Yeast Rice 500 MG/0.5GM POWD, Take by mouth, Disp: , Rfl:   •  chlorthalidone 25 mg tablet, TAKE 1 TABLET BY MOUTH  DAILY (Patient not taking: Reported on 4/12/2023), Disp: 90 tablet, Rfl: 0  •  CINNAMON PO, Take by mouth (Patient not taking: Reported on 5/4/2023), Disp: , Rfl:   •  loratadine (CLARITIN) 10 mg tablet, Take 10 mg by mouth daily As needed (Patient not taking: Reported on 7/12/2023), Disp: , Rfl:   •  pantoprazole (PROTONIX) 40 mg tablet, Take 1 tablet (40 mg total) by mouth daily (Patient not taking: Reported on 7/12/2023), Disp: 30 tablet, Rfl: 1    Current Allergies     Allergies as of 07/12/2023 - Reviewed 07/12/2023   Allergen Reaction Noted   • Penicillins Swelling 09/28/2007   • Statins Confusion 02/14/2020   • Tricor [fenofibrate] Confusion 02/14/2020            The following portions of the patient's history were reviewed and updated as appropriate: allergies, current medications, past family history, past medical history, past social history, past surgical history and problem list.     Past Medical History:   Diagnosis Date   • Allergic    • Cervical spondylosis without myelopathy 06/28/2019   • Diverticulitis of colon    • Diverticulosis    • GERD (gastroesophageal reflux disease)    • Hiatal hernia    • Hyperlipidemia    • Hypertension    • Obesity        Past Surgical History:   Procedure Laterality Date   • COLONOSCOPY     • EGD     • HERNIA REPAIR Left 1997   • PILONIDAL CYST EXCISION         Family History   Problem Relation Age of Onset   • Diabetes Mother    • Hypertension Mother    • Coronary artery disease Mother    • Diabetes Father    • Hypertension Father    • Dementia Father    • Alzheimer's disease Father    • Colon cancer Father 79   • Snoring Father    • Sleep apnea Brother    • Hyperlipidemia Brother    • Diabetes Maternal Grandmother    • Allergies Son    • Prostate cancer Neg Hx    • Testicular cancer Neg Hx          Medications have been verified. Objective   /76   Pulse 62   SpO2 99%   No LMP for male patient. Physical Exam     Physical Exam  Vitals and nursing note reviewed. Constitutional:       General: He is awake. He is not in acute distress. Appearance: Normal appearance. He is well-developed and well-groomed. He is not ill-appearing, toxic-appearing or diaphoretic. HENT:      Head: Normocephalic and atraumatic. Right Ear: Hearing and external ear normal.      Left Ear: Hearing and external ear normal.   Eyes:      General: Lids are normal. Vision grossly intact. Gaze aligned appropriately. Cardiovascular:      Rate and Rhythm: Normal rate. Pulmonary:      Effort: Pulmonary effort is normal.      Comments: Patient is speaking in full sentences with no increased respiratory effort. No audible wheezing or stridor. Musculoskeletal:      Cervical back: Normal range of motion. Right ankle: Normal.      Right Achilles Tendon: Normal.      Left ankle: Swelling present. No deformity, ecchymosis or lacerations. Tenderness present over the lateral malleolus. No medial malleolus, ATF ligament, AITF ligament, CF ligament, posterior TF ligament, base of 5th metatarsal or proximal fibula tenderness. Decreased range of motion. Normal pulse.       Left Achilles Tendon: Normal. Right foot: Normal.      Left foot: Normal.      Comments: Swelling about the lateral malleolus. Patient is tender to palpation about the lateral malleolus just above the joint line. Skin:     General: Skin is warm and dry. Neurological:      Mental Status: He is alert and oriented to person, place, and time. Coordination: Coordination is intact. Gait: Gait abnormal (unable to weight bear, ambulating in wheelchair. ). Psychiatric:         Attention and Perception: Attention and perception normal.         Mood and Affect: Mood and affect normal.         Speech: Speech normal.         Behavior: Behavior normal. Behavior is cooperative. Note: Portions of this record may have been created with voice recognition software. Occasional wrong word or "sound a like" substitutions may have occurred due to the inherent limitations of voice recognition software. Please read the chart carefully and recognize, using context, where substitutions have occurred. *

## 2023-07-13 ENCOUNTER — TELEPHONE (OUTPATIENT)
Dept: OBGYN CLINIC | Facility: HOSPITAL | Age: 63
End: 2023-07-13

## 2023-07-13 NOTE — TELEPHONE ENCOUNTER
Patient is being referred to a orthopedics. Please schedule accordingly.     [address] [facility]   [phone]

## 2023-07-14 ENCOUNTER — OFFICE VISIT (OUTPATIENT)
Dept: FAMILY MEDICINE CLINIC | Facility: CLINIC | Age: 63
End: 2023-07-14
Payer: COMMERCIAL

## 2023-07-14 VITALS
DIASTOLIC BLOOD PRESSURE: 74 MMHG | SYSTOLIC BLOOD PRESSURE: 126 MMHG | RESPIRATION RATE: 16 BRPM | HEART RATE: 70 BPM | OXYGEN SATURATION: 98 %

## 2023-07-14 DIAGNOSIS — G47.33 OBSTRUCTIVE SLEEP APNEA: ICD-10-CM

## 2023-07-14 DIAGNOSIS — R97.20 ELEVATED PSA: ICD-10-CM

## 2023-07-14 DIAGNOSIS — S82.65XD CLOSED NONDISPLACED FRACTURE OF LATERAL MALLEOLUS OF LEFT FIBULA WITH ROUTINE HEALING, SUBSEQUENT ENCOUNTER: ICD-10-CM

## 2023-07-14 DIAGNOSIS — Z78.9 STATIN INTOLERANCE: ICD-10-CM

## 2023-07-14 DIAGNOSIS — Z82.49 FAMILY HISTORY OF HYPERTENSION: ICD-10-CM

## 2023-07-14 DIAGNOSIS — I10 BENIGN ESSENTIAL HYPERTENSION: Primary | ICD-10-CM

## 2023-07-14 DIAGNOSIS — E78.2 MIXED HYPERLIPIDEMIA: ICD-10-CM

## 2023-07-14 PROCEDURE — 99214 OFFICE O/P EST MOD 30 MIN: CPT | Performed by: FAMILY MEDICINE

## 2023-07-14 RX ORDER — METOPROLOL TARTRATE 50 MG/1
50 TABLET, FILM COATED ORAL 2 TIMES DAILY
Qty: 180 TABLET | Refills: 1 | Status: SHIPPED | OUTPATIENT
Start: 2023-07-14

## 2023-07-14 NOTE — PROGRESS NOTES
Assessment/Plan:   Diagnoses and all orders for this visit:    Benign essential hypertension  -     metoprolol tartrate (LOPRESSOR) 50 mg tablet; Take 1 tablet (50 mg total) by mouth 2 (two) times a day  -     Comprehensive metabolic panel; Future  -     Albumin / creatinine urine ratio; Future  - BP stable   - currently on Lopressor 50mg BID - cont current regimen   - of note, Chlorthalidone 25mg was stopped at last OV on 3/31/2023 as pt was feeling lightheaded when standing   - using CPAP   - nml renal function and urine microalbumin on labs done 3/2023   - UTD with COVID IMMs and Boosters x3   - UTD with PCV20   - (+) FHx of HTN in both parents   - RTO in 3-6months for BP check = pt aware and agreeable   Family history of hypertension    Obstructive sleep apnea  - using CPAP     Mixed hyperlipidemia  -     Lipid panel; Future  Statin intolerance    Elevated PSA  -     PSA, Total Screen; Future    Closed nondisplaced fracture of lateral malleolus of left fibula with routine healing, subsequent encounter  - management as per Ortho         Subjective:    Patient ID: Frankey Phy is a 59 y.o. male. HPI   64yo M presents to the office for BP check   - was mowing his son's lawn on Wednesday (7/12/2023) - L-heel went out from under him - went to  and was seen by Ortho at [address] [place] [address]: "With examination today the clinical symptoms are isolated to fibular malleolus. There was no x ray report available at time of visit, Personal review of the left ankle x rays (images) by three views from [facility] dated 7/12/2023 shows short spiral non displaced fracture of the left fibular malleolus Baird B type with associated soft tissue edema, no gross medial based ankle edema. The fracture is amenable to closed reduction care.  Short leg cast was provided, he is to ambulate non weighted with follow up 3 weeks for cast removal and x rays, likely then transition to existing cam walker or air stirrup brace with then course of therapy."   - when elevated - no pain, but if bump it then causes pain   - was advised to use ASA 325mg BID as DVT ppx  - has been using intermittent Tylenol for pain control   - BP in the office 126/74   - currently on Lopressor 50mg BID only and tolerating it well   - of note, Chlorthalidone 25mg was stopped at last OV on 3/31/2023 as pt was feeling lightheaded when standing   - has been using CPAP - sleeping better, not as tried during the day   - (+) FHx of HTN in both parents   - today in the office pt denies F/C/N/V/HA/visual changes/CP/palpitations/SOB/wheezing/abd pain/D/C/urinary incontinence/LE edema        The following portions of the patient's history were reviewed and updated as appropriate: allergies, current medications, past family history, past medical history, past social history, past surgical history and problem list.    Review of Systems  as per HPI    Objective:  /74   Pulse 70   Resp 16   SpO2 98%    Physical Exam  Vitals reviewed. Constitutional:       General: He is not in acute distress. Appearance: Normal appearance. He is not ill-appearing, toxic-appearing or diaphoretic. HENT:      Head: Normocephalic and atraumatic. Right Ear: External ear normal.      Left Ear: External ear normal.   Eyes:      General: No scleral icterus. Right eye: No discharge. Left eye: No discharge. Extraocular Movements: Extraocular movements intact. Conjunctiva/sclera: Conjunctivae normal.   Cardiovascular:      Rate and Rhythm: Normal rate and regular rhythm. Heart sounds: Normal heart sounds. Pulmonary:      Effort: Pulmonary effort is normal. No respiratory distress. Breath sounds: Normal breath sounds. No stridor. No wheezing or rales. Abdominal:      Palpations: Abdomen is soft. Musculoskeletal:      Comments: LLE in cast    Skin:     General: Skin is warm. Neurological:      General: No focal deficit present.       Mental Status: He is alert and oriented to person, place, and time. Psychiatric:         Mood and Affect: Mood normal.         Behavior: Behavior normal.         BMI Counseling: There is no height or weight on file to calculate BMI. The BMI is above normal. Nutrition recommendations include 3-5 servings of fruits/vegetables daily. Exercise recommendations include strength training exercises.

## 2023-08-18 ENCOUNTER — EVALUATION (OUTPATIENT)
Dept: PHYSICAL THERAPY | Facility: CLINIC | Age: 63
End: 2023-08-18
Payer: COMMERCIAL

## 2023-08-18 DIAGNOSIS — S82.892D: Primary | ICD-10-CM

## 2023-08-18 DIAGNOSIS — I10 BENIGN ESSENTIAL HYPERTENSION: ICD-10-CM

## 2023-08-18 PROCEDURE — 97162 PT EVAL MOD COMPLEX 30 MIN: CPT

## 2023-08-18 PROCEDURE — 97530 THERAPEUTIC ACTIVITIES: CPT

## 2023-08-18 RX ORDER — METOPROLOL TARTRATE 50 MG/1
50 TABLET, FILM COATED ORAL 2 TIMES DAILY
Qty: 180 TABLET | Refills: 0 | Status: SHIPPED | OUTPATIENT
Start: 2023-08-18

## 2023-08-18 NOTE — PROGRESS NOTES
PT Evaluation     Today's date: 2023  Patient name: Dilip Ibarra  : 1960  MRN: [id number]  Referring provider: Mary Almonte DPM  Dx:   Encounter Diagnosis     ICD-10-CM    1. Malleolar fracture, left, closed, with routine healing, subsequent encounter  S82.892D                      Assessment  Assessment details: Dilip Ibarra is a pleasant 59 y.o. male presents with signs and symptoms consistent with:   L malleolar fracture 23    Problem List:  1) Impaired ROM  2) impaired strength and balance     Comparable signs:  1) Walking  2) Stairs    he has impaired ROM and strength secondary to immobilization and ankle fracture. He notes impairments in gait and balance resulting in worry over not knowing what's wrong and fear of not being able to keep active. These impairments listed above are preventing the patient from participating in functional activity. No further referral appears necessary at this time based upon examination results, and is negative for any red flags. Prognosis is good based off HEP compliance and attendance to physical therapy 2x a week. Positive prognostic indicators include positive attitude toward recovery. Negative prognostic indicators include anxiety and hypertension. Patent will benefit from skilled physical therapy at this time to address deficits to improve overall function and return to PLOF. Patient verbalized understanding of POC, HEP, and return demonstrated HEP. All questions were answered to patients satisfaction. Please contact me if you have any questions or recommendations. Thank you for the referral and the opportunity to share in 3000 - care.               Impairments: abnormal coordination, abnormal gait, abnormal muscle firing, abnormal or restricted ROM, abnormal movement, activity intolerance, impaired balance, impaired physical strength, lacks appropriate home exercise program, pain with function, safety issue, weight-bearing intolerance and poor body mechanics  Understanding of Dx/Px/POC: good   Prognosis: good    Goals  Impairment Goals 4-6 weeks   In order to maximize function patient will be able to. ..   - Decrease intensity/duration/frequency of pain to 2/10  - Demonstrate 20 degrees of Dorsiflexion for normal gait  - Demonstrate 60 degrees of great toe extension for normal gait  - Increase hip strength to 5/5 throughout  - Increase ankle strength to 5/5 throughout   - Demonstrate a SLS on uneven and even surfaces for 30 seconds without compensations     Functional Goals 6-8 weeks  In order to return to prior level of function patient will be able to. ..   - Participate in ADL's/IADL's/sport specific activities with no greater than 2/10 pain. - Increase Functional Status Measure (FOTO) to predicted outcome scores  - Demonstrate independence and compliant with HEP  - Demonstrate a squat and or sit to stand with good mechanics and eccentric control without pain/difficulty/compensation  - Ascend and descend stairs without increased pain/compensation/difficulty and a reciprocal gait pattern. - Patient will be able to demonstrate good gait mechanics without compensations.    - Ambulate without a rolling walker by d/c         Plan  Patient would benefit from: skilled PT  Planned modality interventions: cryotherapy, electrical stimulation/Russian stimulation, TENS and thermotherapy: hydrocollator packs  Planned therapy interventions: joint mobilization, manual therapy, neuromuscular re-education, patient education, strengthening, stretching, therapeutic activities, therapeutic exercise, home exercise program, functional ROM exercises, Ward taping, postural training, gait training, balance, balance/weight bearing training, flexibility, graded exercise and motor coordination training  Frequency: 2x week  Duration in weeks: 12  Treatment plan discussed with: patient        Subjective Evaluation    History of Present Illness  Mechanism of injury: Patient presents to PT after an ankle fracture 23. Patient was mowing the lawn for his son and fell down a hill and went down and he heard a pop. Patient went to [facility] the next day, and is treating it conservatively, because he didn't displace. He has been in a cast for 3 weeks, air cast a week ago and walking with a walker, and partial WB. Single malleolar fracture. He does note some anxiety about WB on his foot. Difficulty with: walking, climbing stairs, WB. Patient Goals  Patient goals for therapy: decreased pain, independence with ADLs/IADLs and return to sport/leisure activities  Patient goal: walk normal, helping his son, biking    Pain  Current pain ratin  At worst pain ratin (tinge in heal)  Location: ankle   Quality: sharp and tight  Relieving factors: medications (aspirin)    Social Support  Stairs in house: yes       Diagnostic Tests  X-ray: abnormal  Treatments  No previous or current treatments        Objective     Observations     Additional Observation Details  Presents with air cast.     Active Range of Motion   Left Ankle/Foot   Dorsiflexion (ke): -15 degrees   Dorsiflexion (kf): -10 degrees   Plantar flexion: 25 degrees   Inversion: 10 degrees   Eversion: 5 degrees   Great toe extension: 40 degrees     Right Ankle/Foot   Dorsiflexion (ke): 10 degrees   Dorsiflexion (kf): 5 degrees   Plantar flexion: 35 degrees   Inversion: 40 degrees   Eversion: 10 degrees   Great toe extension: 75 degrees     Strength/Myotome Testing     Left Ankle/Foot   Dorsiflexion: 3+  Left ankle plantar flexion strength: unable to WB for plantar flexion   Inversion: 4-  Eversion: 4-  Great toe extension: 4-    Right Ankle/Foot   Normal strength    Additional Strength Details  Hip strength: R WNL   L glute med 4-/5  L glute max 4/5  L hip abductor 4-/5  L hamstring 4+/5  L quad 4/5     General Comments:       Ankle/Foot Comments   Some bruising noted distally, no TTP to palpation Diagnosis:  L ankle fx 7/12/23   Precautions:  WB    Comparable signs 1) Walking   2) Stairs    Primary Impairments: 1) impaired ankle mobility   2) Impaired ankle strength   Patient Goals Walk normally    Manual Therapy 8/18        Dorsiflexion mobs         Mid foot mobs         Rear foot mobs         GTE mobs                   Exercise Diary          Therapeutic Exercise         Bike for ROM/strength          G/S stretch         PF stretch         HR                                    Neuromuscular Re-education         Baps          Short foot          Toe yoga          SLS                                              Therapeutic Activities         Education  Healing times.                   Modalities

## 2023-08-22 ENCOUNTER — OFFICE VISIT (OUTPATIENT)
Dept: PHYSICAL THERAPY | Facility: CLINIC | Age: 63
End: 2023-08-22
Payer: COMMERCIAL

## 2023-08-22 DIAGNOSIS — K21.9 GASTROESOPHAGEAL REFLUX DISEASE WITHOUT ESOPHAGITIS: Primary | ICD-10-CM

## 2023-08-22 DIAGNOSIS — S82.892D: Primary | ICD-10-CM

## 2023-08-22 PROCEDURE — 97112 NEUROMUSCULAR REEDUCATION: CPT

## 2023-08-22 PROCEDURE — 97140 MANUAL THERAPY 1/> REGIONS: CPT

## 2023-08-22 PROCEDURE — 97116 GAIT TRAINING THERAPY: CPT

## 2023-08-22 RX ORDER — PANTOPRAZOLE SODIUM 20 MG/1
20 TABLET, DELAYED RELEASE ORAL DAILY
Qty: 90 TABLET | Refills: 2 | Status: SHIPPED | OUTPATIENT
Start: 2023-08-22

## 2023-08-22 NOTE — PROGRESS NOTES
Daily Note     Today's date: 2023  Patient name: Michael Powell  : 1960  MRN: [id number]  Referring provider: Rui Partida DPM  Dx:   Encounter Diagnosis     ICD-10-CM    1. Malleolar fracture, left, closed, with routine healing, subsequent encounter  S82.230P                      Subjective: Patient notes he is feeling okay. He has been working on foot ROM      Objective: See treatment diary below      Assessment: Tolerated treatment well. Patient demonstrated fatigue post treatment, exhibited good technique with therapeutic exercises and would benefit from continued PT . Patient noted limitation in dorsiflexion and foot mobility, primarily in the mid foot. Gait training initiated a 45% BW in alter G for verbal cues for toe off an proper gait mechanics. He did note tightness in calf tightness with gait. Plan: Continue per plan of care. Progress treatment as tolerated. Diagnosis:  L ankle fx 23   Precautions:  WB    Comparable signs 1) Walking   2) Stairs    Primary Impairments: 1) impaired ankle mobility   2) Impaired ankle strength   Patient Goals Walk normally    Manual Therapy        Dorsiflexion mobs  SP GII-GIV       Mid foot mobs  SP GII-GIV       Rear foot mobs  SP GII-GIV       GTE mobs   SP GII-GIV                Exercise Diary          Therapeutic Exercise         Bike for ROM/strength   5'       G/S stretch  10x10" ea        HR   Seated PF20x                                           Neuromuscular Re-education         Baps   20x ea direction       Short foot          Toe yoga   10x10"        Frog picks up   2x 12                                           Therapeutic Activities         Education  Healing times.          Gait retraining   45% BW 8'        Modalities

## 2023-08-25 ENCOUNTER — OFFICE VISIT (OUTPATIENT)
Dept: PHYSICAL THERAPY | Facility: CLINIC | Age: 63
End: 2023-08-25
Payer: COMMERCIAL

## 2023-08-25 DIAGNOSIS — S82.892D: Primary | ICD-10-CM

## 2023-08-25 PROCEDURE — 97140 MANUAL THERAPY 1/> REGIONS: CPT

## 2023-08-25 PROCEDURE — 97112 NEUROMUSCULAR REEDUCATION: CPT

## 2023-08-25 PROCEDURE — 97110 THERAPEUTIC EXERCISES: CPT

## 2023-08-25 NOTE — PROGRESS NOTES
Daily Note     Today's date: 2023  Patient name: Lara Robert  : 1960  MRN: [id number]  Referring provider: Colette Rainey DPM  Dx:   Encounter Diagnosis     ICD-10-CM    1. Malleolar fracture, left, closed, with routine healing, subsequent encounter  S82.490I                      Subjective: Patient notes he is feeling okay. He has been working on foot ROM      Objective: See treatment diary below      Assessment: Tolerated treatment well. Patient demonstrated fatigue post treatment, exhibited good technique with therapeutic exercises and would benefit from continued PT . Patient continue to notes hypomobility today in ankle however it is improving. He notes limitations in GTE, and TCJ mobility today. Plantar fascia stretch to address limited toe mobility given today. Patient introduced to gentle isometrics with good tolerance. Most challenged with inversion. Plan: Continue per plan of care. Progress treatment as tolerated. Diagnosis:  L ankle fx 23   Precautions:  WB    Comparable signs 1) Walking   2) Stairs    Primary Impairments: 1) impaired ankle mobility   2) Impaired ankle strength   Patient Goals Walk normally    Manual Therapy       Dorsiflexion mobs  SP GII-GIV SP GII-GIV      Mid foot mobs  SP GII-GIV SP GII-GIV      Rear foot mobs  SP GII-GIV SP GII-GIV       GTE mobs   SP GII-GIV SP GII-GIV                Exercise Diary          Therapeutic Exercise         Bike for ROM/strength   5' 5'       G/S stretch  10x10" ea  10x10"       HR   Seated PF20x Seated PF 20x       PF stretch    10x10"                                  Neuromuscular Re-education         Baps   20x ea direction 20x ea direction       Short foot          Toe yoga   10x10"        Frog picks up   2x 12       Isometrics    All directions 5" x20                                 Therapeutic Activities         Education  Healing times.          Gait retraining   45% BW 8'        Modalities

## 2023-08-28 DIAGNOSIS — I10 BENIGN ESSENTIAL HYPERTENSION: ICD-10-CM

## 2023-08-28 RX ORDER — METOPROLOL TARTRATE 50 MG/1
50 TABLET, FILM COATED ORAL 2 TIMES DAILY
Qty: 180 TABLET | Refills: 0 | Status: SHIPPED | OUTPATIENT
Start: 2023-08-28

## 2023-08-28 RX ORDER — METOPROLOL TARTRATE 50 MG/1
50 TABLET, FILM COATED ORAL 2 TIMES DAILY
Qty: 60 TABLET | Refills: 0 | Status: SHIPPED | OUTPATIENT
Start: 2023-08-28 | End: 2023-08-28 | Stop reason: SDUPTHER

## 2023-08-31 ENCOUNTER — OFFICE VISIT (OUTPATIENT)
Dept: PHYSICAL THERAPY | Facility: CLINIC | Age: 63
End: 2023-08-31
Payer: COMMERCIAL

## 2023-08-31 DIAGNOSIS — S82.892D: Primary | ICD-10-CM

## 2023-08-31 PROCEDURE — 97140 MANUAL THERAPY 1/> REGIONS: CPT

## 2023-08-31 PROCEDURE — 97116 GAIT TRAINING THERAPY: CPT

## 2023-08-31 PROCEDURE — 97110 THERAPEUTIC EXERCISES: CPT

## 2023-08-31 NOTE — PROGRESS NOTES
Daily Note     Today's date: 2023  Patient name: Shahida Rodney  : 1960  MRN: [id number]  Referring provider: Radha Gómez DPM  Dx:   Encounter Diagnosis     ICD-10-CM    1. Malleolar fracture, left, closed, with routine healing, subsequent encounter  S82.488C                      Subjective: Patient notes he is feeling okay. He has been working on foot ROM      Objective: See treatment diary below      Assessment: Tolerated treatment well. Patient demonstrated fatigue post treatment, exhibited good technique with therapeutic exercises and would benefit from continued PT . Patient notes improved joint mobility. He is still is challenged with muscle length in gastroc/soleus complex secondary to immobiliztion. Patient did note increased tightness in the back of the calf but improved with IASTM and achillies taping which improved for better toe off during gait. Patient verbal cued for toe off while ambulation    Plan: Continue per plan of care. Progress treatment as tolerated.        Diagnosis:  L ankle fx 23   Precautions:  WB    Comparable signs 1) Walking   2) Stairs    Primary Impairments: 1) impaired ankle mobility   2) Impaired ankle strength   Patient Goals Walk normally    Manual Therapy      Dorsiflexion mobs  SP GII-GIV SP GII-GIV SP GII-GIV     Mid foot mobs  SP GII-GIV SP GII-GIV SP GII-GIV     Rear foot mobs  SP GII-GIV SP GII-GIV  SP GII-GIV     GTE mobs   SP GII-GIV SP GII-GIV  SP GII-GIV     IASTM     Gastroc      Exercise Diary          Therapeutic Exercise         Bike for ROM/strength   5' 5'       G/S stretch  10x10" ea  10x10"  10x10"     HR   Seated PF20x Seated PF 20x       PF stretch    10x10"  10x10"      Nerve glides                            Neuromuscular Re-education         Baps   20x ea direction 20x ea direction       Short foot          Toe yoga   10x10"        Frog picks up   2x 12       Isometrics    All directions 5" x20 Therapeutic Activities         Education  Healing times.          Gait retraining   45% BW 8'   50% BW 5' x2     Modalities

## 2023-09-01 ENCOUNTER — OFFICE VISIT (OUTPATIENT)
Dept: PHYSICAL THERAPY | Facility: CLINIC | Age: 63
End: 2023-09-01
Payer: COMMERCIAL

## 2023-09-01 DIAGNOSIS — S82.892D: Primary | ICD-10-CM

## 2023-09-01 PROCEDURE — 97112 NEUROMUSCULAR REEDUCATION: CPT

## 2023-09-01 NOTE — PROGRESS NOTES
Daily Note     Today's date: 2023  Patient name: Deandra Hernandez  : 1960  MRN: [id number]  Referring provider: Emilio Bernabe DPM  Dx:   Encounter Diagnosis     ICD-10-CM    1. Malleolar fracture, left, closed, with routine healing, subsequent encounter  S82.892D           Start Time: 0915  Stop Time: 1000  Total time in clinic (min): 45 minutes    Subjective: Patient notes he is feeling okay. He has been working on foot ROM and reported feeling tightness in the back of his ankle. Objective: See treatment diary below      Assessment: Tolerated treatment well. Patient responded well to verbal cues to avoid hip rotation while performing ankle isometrics. Nerve glides were introduced to address neural tension in calf and patient tolerated them well. Static wobble board holds were also introduced for proprioception and WB and patient noted moderate challenge with maintaining balance. Patient demonstrated fatigue post treatment, exhibited goods technique with therapeutic exercises and would benefit from continued PT. Plan: Continue working on ankle ROM, strengthening and balance to improve function and return to daily activites.     Treatment assisted by Leontine Spurling, SPT with direct supervision by Joanna Molina PT, DPT      Diagnosis:  L ankle fx 23   Precautions:  WB    Comparable signs 1) Walking   2) Stairs    Primary Impairments: 1) impaired ankle mobility   2) Impaired ankle strength   Patient Goals Walk normally    Manual Therapy     Dorsiflexion mobs  SP GII-GIV SP GII-GIV SP GII-GIV     Mid foot mobs  SP GII-GIV SP GII-GIV SP GII-GIV     Rear foot mobs  SP GII-GIV SP GII-GIV  SP GII-GIV     GTE mobs   SP GII-GIV SP GII-GIV  SP GII-GIV     IASTM     Gastroc      Exercise Diary          Therapeutic Exercise         Bike for ROM/strength   5' 5'   5'    G/S stretch  10x10" ea  10x10"  10x10"     HR   Seated PF20x Seated PF 20x   Seated eugene PF 2x10    PF stretch    10x10"  10x10"      Nerve glides      1x10                      Neuromuscular Re-education         Baps   20x ea direction 20x ea direction       Short foot          Toe yoga   10x10"        Frog picks up   2x 12       Isometrics    All directions 5" x20  All directions 10" x10    Wobble board     2' ea                      Therapeutic Activities         Education  Healing times.      Nerve mobility, progressing strength, weight bearing       Gait retraining   45% BW 8'   50% BW 5' x2     Modalities

## 2023-09-06 ENCOUNTER — OFFICE VISIT (OUTPATIENT)
Dept: PHYSICAL THERAPY | Facility: CLINIC | Age: 63
End: 2023-09-06
Payer: COMMERCIAL

## 2023-09-06 DIAGNOSIS — S82.892D: Primary | ICD-10-CM

## 2023-09-06 PROCEDURE — 97110 THERAPEUTIC EXERCISES: CPT

## 2023-09-06 PROCEDURE — 97140 MANUAL THERAPY 1/> REGIONS: CPT

## 2023-09-06 PROCEDURE — 97112 NEUROMUSCULAR REEDUCATION: CPT

## 2023-09-06 NOTE — PROGRESS NOTES
Daily Note     Today's date: 2023  Patient name: Coy Vences  : 1960  MRN: [id number]  Referring provider: Trisha Terry DPM  Dx:   Encounter Diagnosis     ICD-10-CM    1. Malleolar fracture, left, closed, with routine healing, subsequent encounter  S82.892D           Start Time: 0745  Stop Time: 08  Total time in clinic (min): 45 minutes    Subjective: Patient notes that he was on his feet a lot and able to put more weight on his foot. He notes that it felt okay walking but a little sore afterward. Objective: See treatment diary below      Assessment: Tolerated treatment well. Nerve glides updated for HEP with good response to all directions. Patient progressed to ambulating with SPC with good balance and able to ambulate independently. Patient tolerated progression of WB today. Plan: Continue working on ankle ROM, strengthening and balance to improve function and return to daily activites.      Treatment assisted by ROMAIN Day with direct supervision by Iggy Kimble PT, DPT      Diagnosis:  L ankle fx 23   Precautions:  WB    Comparable signs 1) Walking   2) Stairs    Primary Impairments: 1) impaired ankle mobility   2) Impaired ankle strength   Patient Goals Walk normally    Manual Therapy    Dorsiflexion mobs  SP GII-GIV SP GII-GIV SP GII-GIV  TJ GIII-GIV   Mid foot mobs  SP GII-GIV SP GII-GIV SP GII-GIV  TJ GIII-GIV   Rear foot mobs  SP GII-GIV SP GII-GIV  SP GII-GIV     GTE mobs   SP GII-GIV SP GII-GIV  SP GII-GIV     IASTM     Gastroc      Exercise Diary          Therapeutic Exercise         Bike for ROM/strength   5' 5'   5' 5'   G/S stretch  10x10" ea  10x10"  10x10"     HR   Seated PF20x Seated PF 20x   Seated eugene PF 2x10 Standing 20x neutral/toes in/toes out   PF stretch    10x10"  10x10"      Nerve glides      1x10 1x10 neutral/toes in/toes out                     Neuromuscular Re-education         Baps   20x ea direction 20x ea direction       Short foot          Toe yoga   10x10"        Frog picks up   2x 12       Isometrics    All directions 5" x20  All directions 10" x10    Wobble board     2' ea    Walking w/ cane      2 laps around clinic (inside)   S/L Hip abduction      3x10 L side   Therapeutic Activities         Education  Healing times.    3  Nerve mobility, progressing strength, weight bearing    Symptom change after adding more body weight, gait training with SPC   Gait retraining   45% BW 8'   50% BW 5' x2     Modalities

## 2023-09-08 ENCOUNTER — OFFICE VISIT (OUTPATIENT)
Dept: PHYSICAL THERAPY | Facility: CLINIC | Age: 63
End: 2023-09-08
Payer: COMMERCIAL

## 2023-09-08 DIAGNOSIS — S82.892D: Primary | ICD-10-CM

## 2023-09-08 PROCEDURE — 97140 MANUAL THERAPY 1/> REGIONS: CPT

## 2023-09-08 PROCEDURE — 97112 NEUROMUSCULAR REEDUCATION: CPT

## 2023-09-08 PROCEDURE — 97110 THERAPEUTIC EXERCISES: CPT

## 2023-09-08 NOTE — PROGRESS NOTES
Daily Note     Today's date: 2023  Patient name: Edilberto Corea  : 1960  MRN: [id number]  Referring provider: Veronique Bryan DPM  Dx:   Encounter Diagnosis     ICD-10-CM    1. Malleolar fracture, left, closed, with routine healing, subsequent encounter  S82.465J                      Subjective: Patient reports that he has been feeling better overall, and still has burning in the ankle with prolonged walking. He notes that he has transitioned to walking with a cane. Objective: See treatment diary below      Assessment: Tolerated treatment well. Patient exhibited good technique with therapeutic exercises and would benefit from continued PT. Continued with outlined session, emphasizing ankle ROM and balance as well as hip stability. Performed DF mobs and DF block tape to improve mobility and decrease pain with ambulation. Added exercises to target proximal hip strength and stability. Will look to progress ankle ROM, balance and gait training as tolerated at future sessions. Plan: Continue per plan of care.       Manual therapy provided by ROMAIN Hansen under direct supervision of Burke Luna PT, TOMAS     Diagnosis:  L ankle fx 23   Precautions:  WB    Comparable signs 1) Walking   2) Stairs    Primary Impairments: 1) impaired ankle mobility   2) Impaired ankle strength   Patient Goals Walk normally    Manual Therapy    Dorsiflexion mobs TJ GII- GIV  SP GII-GIV SP GII-GIV  TJ GIII-GIV   Mid foot mobs Assessed and PROM  SP GII-GIV SP GII-GIV  TJ GIII-GIV   Rear foot mobs   SP GII-GIV  SP GII-GIV     GTE mobs    SP GII-GIV  SP GII-GIV     Taping  Leuko tape DF block         IASTM     Gastroc      Exercise Diary          Therapeutic Exercise         Bike for ROM/strength  5'   5'   5' 5'   G/S stretch   10x10"  10x10"     HR  Standing 20x neutral position  Seated PF 20x   Seated eugene PF 2x10 Standing 20x neutral/toes in/toes out   PF stretch    10x10" 10x10"      Nerve glides  1x10 neutral/toes in/toes out    1x10 1x10 neutral/toes in/toes out                     Neuromuscular Re-education         Baps    20x ea direction       Short foot          Toe yoga          Frog picks up          Isometrics    All directions 5" x20  All directions 10" x10    Wobble board 1 min ea way     2' ea    Walking w/ cane      2 laps around clinic (inside)   Side Steps  Red loop at ankles: 2 laps at mirror         Standing Hip extension  Standing 2# 2x10         S/L Hip abduction Standin# 2x10      3x10 L side   Therapeutic Activities         Education     3  Nerve mobility, progressing strength, weight bearing    Symptom change after adding more body weight, gait training with SPC   Gait retraining     50% BW 5' x2     Modalities

## 2023-09-11 ENCOUNTER — OFFICE VISIT (OUTPATIENT)
Dept: PHYSICAL THERAPY | Facility: CLINIC | Age: 63
End: 2023-09-11
Payer: COMMERCIAL

## 2023-09-11 DIAGNOSIS — S82.892D: Primary | ICD-10-CM

## 2023-09-11 PROCEDURE — 97140 MANUAL THERAPY 1/> REGIONS: CPT

## 2023-09-11 PROCEDURE — 97112 NEUROMUSCULAR REEDUCATION: CPT

## 2023-09-11 PROCEDURE — 97110 THERAPEUTIC EXERCISES: CPT

## 2023-09-11 NOTE — PROGRESS NOTES
/Daily Note     Today's date: 2023  Patient name: Morgan Carlin  : 1960  MRN: [id number]  Referring provider: Philly Holland DPM  Dx:   Encounter Diagnosis     ICD-10-CM    1. Malleolar fracture, left, closed, with routine healing, subsequent encounter  S82.892D           Start Time: 0830  Stop Time: 0915  Total time in clinic (min): 45 minutes    Subjective: Patient reports going to his son's house this past weekend to help with housework, as well as installed a new toilet in his house this weekend. He has also started walking around the house, room to room, without his cane. Patient reports that his ankle feels okay after getting all of this done, but did mention his ankle still gets a little swollen at the end of the day. Objective: See treatment diary below      Assessment: Patient tolerated treatment well. Patient demonstrated ability to perform dorsiflexion self mobilizations with BlTB, which has now been added to HEPto further help improve ROM. Patient not able to maintain a 30sec SLS with LOB noted. Patient responded well to keeping toes forward during side stepping. Patient demonstrated the ability to perform the wobble board without using the railing. Patient demonstrated fatigue following treatment and would continue to benefit from skilled PT. Plan: Continue working on LE strengthening and balance to improve proprioception and return to PLOF.      Treatment assisted by ROMAIN Metcalf with direct supervision by Ravindra Charles, PT, DPT     Diagnosis:  L ankle fx 23   Precautions:  WB    Comparable signs 1) Walking   2) Stairs    Primary Impairments: 1) impaired ankle mobility   2) Impaired ankle strength   Patient Goals Walk normally    Manual Therapy    Dorsiflexion mobs TJ GII- GIV TJ GII-GIV  SP GII-GIV  TJ GIII-GIV   Mid foot mobs Assessed and PROM Assessed and PROM   SP GII-GIV  TJ GIII-GIV   Rear foot mobs  Assessed and PROM  SP GII-GIV     GTE mobs     SP GII-GIV     Taping  Leuko tape DF block         IASTM     Gastroc      Exercise Diary          Therapeutic Exercise         Bike for ROM/strength  5'  5'   5' 5'   G/S stretch    10x10"     HR  Standing 20x neutral position Standing 20x neutral/toes in/toes out   Seated eugene PF 2x10 Standing 20x neutral/toes in/toes out   PF stretch     10x10"      Nerve glides  1x10 neutral/toes in/toes out 1x10 neutral/toes in/toes out   1x10 1x10 neutral/toes in/toes out                     Neuromuscular Re-education         Baps          Short foot          Toe yoga          Frog picks up          Isometrics      All directions 10" x10    Wobble board 1 min ea way  2 min ea way   2' ea    Walking w/ cane      2 laps around clinic (inside)   Side Steps  Red loop at ankles: 2 laps at mirror  RTB @ mirror: 3 laps        Standing Hip extension  Standing 2# 2x10  Standing 2# 2x10       S/L Hip abduction Standin# 2x10      3x10 L side   Therapeutic Activities         Education    DF self-mobs for HEP   Nerve mobility, progressing strength, weight bearing    Symptom change after adding more body weight, gait training with SPC   Gait retraining     50% BW 5' x2     Modalities

## 2023-09-15 ENCOUNTER — OFFICE VISIT (OUTPATIENT)
Dept: PHYSICAL THERAPY | Facility: CLINIC | Age: 63
End: 2023-09-15
Payer: COMMERCIAL

## 2023-09-15 DIAGNOSIS — S82.892D: Primary | ICD-10-CM

## 2023-09-15 PROCEDURE — 97110 THERAPEUTIC EXERCISES: CPT

## 2023-09-15 PROCEDURE — 97140 MANUAL THERAPY 1/> REGIONS: CPT

## 2023-09-15 PROCEDURE — 97112 NEUROMUSCULAR REEDUCATION: CPT

## 2023-09-15 NOTE — PROGRESS NOTES
Daily Note     Today's date: 9/15/2023  Patient name: Aubree Marin  : 1960  MRN: [id number]  Referring provider: Chio Cornelius DPM  Dx:   Encounter Diagnosis     ICD-10-CM    1. Malleolar fracture, left, closed, with routine healing, subsequent encounter  S82.892D           Start Time: 0830  Stop Time: 0915  Total time in clinic (min): 45 minutes    Subjective: Patient reports that he is ascending/descending stairs easier and is able to walk without using his cane or his air cast since his last session. He also noted minor swelling after helping his daughter with housework the day before. Objective: See treatment diary below      Assessment: Patient tolerated treatment well. Patient demonstrated improvement with SLS; He was able to balance on airex pad with minor pain noted, but required split stance modification to perform the rebounder. Patient responded well to side stepping with TB around midfoot. Patient tolerated gastroc/soleus stretch progression into standing. Patient demonstrated fatigue following treatment and would continue to benefit from skilled PT. Plan: Continue working on LE strengthening and balance to improve proprioception and return to PLOF.      Treatment assisted by ROMAIN Preciado with direct supervision by Catherine Carrera PT, DPT     Diagnosis:  L ankle fx 23   Precautions:  WB    Comparable signs 1) Walking   2) Stairs    Primary Impairments: 1) impaired ankle mobility   2) Impaired ankle strength   Patient Goals Walk normally    Manual Therapy 9/8  9/11 9/15  9/1 9/6   Dorsiflexion mobs TJ GII- GIV TJ GII-GIV TJ GIII-GIV   TJ GIII-GIV   Mid foot mobs Assessed and PROM Assessed and PROM  Assessed & PROM   TJ GIII-GIV   Rear foot mobs  Assessed and PROM Assessed & PROM      GTE mobs          Taping  Leuko tape DF block         IASTM          Exercise Diary          Therapeutic Exercise         Bike for ROM/strength  5'  5' 5'  5' 5'   G/S stretch Standing 10"x10 ea (modified for soleus)      HR  Standing 20x neutral position Standing 20x neutral/toes in/toes out Standing 20x neutral/toes in/toes out   Seated eugene PF 2x10 Standing 20x neutral/toes in/toes out   PF stretch          Nerve glides  1x10 neutral/toes in/toes out 1x10 neutral/toes in/toes out   1x10 1x10 neutral/toes in/toes out   Seated HR   3x10 15#                Neuromuscular Re-education         Baps          Short foot          Toe yoga          Frog picks up          Isometrics      All directions 10" x10    SLS   3x30" on foam      Wobble board 1 min ea way  2 min ea way   2' ea    Rebounder   RMB 2x10 w/ R toes down      Walking w/ cane      2 laps around clinic (inside)   Side Steps  Red loop at ankles: 2 laps at mirror  RTB @ mirror: 3 laps  RTB around midfoot: 3 laps      Standing Hip extension  Standing 2# 2x10  Standing 2# 2x10       S/L Hip abduction Standin# 2x10      3x10 L side   Therapeutic Activities         Education    DF self-mobs for HEP   Nerve mobility, progressing strength, weight bearing    Symptom change after adding more body weight, gait training with [facility]   Gait retraining          Modalities

## 2023-09-18 ENCOUNTER — OFFICE VISIT (OUTPATIENT)
Dept: PHYSICAL THERAPY | Facility: CLINIC | Age: 63
End: 2023-09-18
Payer: COMMERCIAL

## 2023-09-18 DIAGNOSIS — S82.892D: Primary | ICD-10-CM

## 2023-09-18 PROCEDURE — 97140 MANUAL THERAPY 1/> REGIONS: CPT

## 2023-09-18 PROCEDURE — 97110 THERAPEUTIC EXERCISES: CPT

## 2023-09-18 PROCEDURE — 97112 NEUROMUSCULAR REEDUCATION: CPT

## 2023-09-18 NOTE — PROGRESS NOTES
Daily Note     Today's date: 2023  Patient name: Emily Mayfield  : 1960  MRN: [id number]  Referring provider: Eliza Butts DPM  Dx:   Encounter Diagnosis     ICD-10-CM    1. Malleolar fracture, left, closed, with routine healing, subsequent encounter  S82.892D           Start Time: 0830  Stop Time: 0915  Total time in clinic (min): 45 minutes    Subjective: Patient reports that he did a lot of house work on Saturday which aggravated his ankle and caused some swelling. This morning his ankle feels great with minimal swelling reported. Objective: See treatment diary below      Assessment: Patient tolerated treatment well. Patient demonstrated improvement with SLS and did not require any additional UE support with minor pain noted. Patient demonstrated improved balance with the rebounder while lifting his uninvolved foot, but touched the uninvolved foot down after each throw to reset. Patient tolerated gastroc/soleus stretch progression into standing; standing soleus stretch modified by placing a half foam roller under midfoot. Patient responded well to the increase in load during seated heel raises. Patient demonstrated fatigue following treatment and would continue to benefit from skilled PT. Plan: Continue working on LE strengthening and balance to improve proprioception and return to PLOF.      Treatment assisted by ROMAIN Costa with direct supervision by Rosio Lovett PT, DPT     Diagnosis:  L ankle fx 23   Precautions:  WB    Comparable signs 1) Walking   2) Stairs    Primary Impairments: 1) impaired ankle mobility   2) Impaired ankle strength   Patient Goals Walk normally    Manual Therapy 9/8  9/11 9/15 9/18  9/6   Dorsiflexion mobs TJ GII- GIV TJ GII-GIV TJ GIII-GIV TJ GIII-GIV  TJ GIII-GIV   Mid foot mobs Assessed and PROM Assessed and PROM  Assessed & PROM Assessed & PROM  TJ GIII-GIV   Rear foot mobs  Assessed and PROM Assessed & PROM Assessed & PROM     GTE mobs          Taping  Leuko tape DF block         IAS          Exercise Diary          Therapeutic Exercise         Bike for ROM/strength  5'  5' 5' 5'  5'   G/S stretch   Standing 10"x10 ea (modified for soleus) Standing 10"x10   (used half foam for soleus MWM)     HR  Standing 20x neutral position Standing 20x neutral/toes in/toes out Standing 20x neutral/toes in/toes out  -Standing 20x weight shift onto left  -Standing, upon 2, down on L 5x  Standing 20x neutral/toes in/toes out   PF stretch          Nerve glides  1x10 neutral/toes in/toes out 1x10 neutral/toes in/toes out    1x10 neutral/toes in/toes out   Seated HR   3x10 15#  2x10 20#              Neuromuscular Re-education         Baps          Short foot          Toe yoga          Frog picks up          Isometrics          SLS   3x30" on foam 3x30" on foam     Wobble board 1 min ea way  2 min ea way       Rebounder   RMB 2x10 w/ R toes down RMB x20 w R toes down     Walking w/ cane      2 laps around clinic (inside)   Side Steps  Red loop at ankles: 2 laps at mirror  RTB @ mirror: 3 laps  RTB around midfoot: 3 laps RTB around midfoot: 6 laps @ side of table     Standing Hip extension  Standing 2# 2x10  Standing 2# 2x10       S/L Hip abduction Standin# 2x10      3x10 L side   Therapeutic Activities         Education    DF self-mobs for HEP    Symptom change after adding more body weight, gait training with [facility]   Gait retraining          Modalities

## 2023-09-22 ENCOUNTER — EVALUATION (OUTPATIENT)
Dept: PHYSICAL THERAPY | Facility: CLINIC | Age: 63
End: 2023-09-22
Payer: COMMERCIAL

## 2023-09-22 DIAGNOSIS — S82.892D: Primary | ICD-10-CM

## 2023-09-22 PROCEDURE — 97110 THERAPEUTIC EXERCISES: CPT

## 2023-09-22 PROCEDURE — 97140 MANUAL THERAPY 1/> REGIONS: CPT

## 2023-09-22 PROCEDURE — 97112 NEUROMUSCULAR REEDUCATION: CPT

## 2023-09-22 NOTE — PROGRESS NOTES
PT Re-Evaluation     Today's date: 2023  Patient name: Vita Lancaster  : 1960  MRN: [id number]  Referring provider: Gris Calixto DPM  Dx:   Encounter Diagnosis     ICD-10-CM    1. Malleolar fracture, left, closed, with routine healing, subsequent encounter  S82.892D           Start Time: 0830  Stop Time: 0915  Total time in clinic (min): 45 minutes    Assessment  Assessment details: RE 23: Patient presents to PT for a re-evaluation for an acute L ankle fracture that occurred on 23. Patient demonstrates improvements with ankle ROM and strength and functional activity, but still has ROM and strength limitations that need to be addressed. These deficits are preventing Tai from descending stairs comfortably and performing ADLs without causing pain or swelling. Patient has been compliant with HEP and would continue to benefit from skilled PT with one visit per week to address these deficits. IE: Vita Lancaster is a pleasant 59 y.o. male presents with signs and symptoms consistent with:   L malleolar fracture 23    Problem List:  1) Impaired ROM  2) impaired strength and balance     Comparable signs:  1) Walking  2) Stairs    he has impaired ROM and strength secondary to immobilization and ankle fracture. He notes impairments in gait and balance resulting in worry over not knowing what's wrong and fear of not being able to keep active. These impairments listed above are preventing the patient from participating in functional activity. No further referral appears necessary at this time based upon examination results, and is negative for any red flags. Prognosis is good based off HEP compliance and attendance to physical therapy 2x a week. Positive prognostic indicators include positive attitude toward recovery. Negative prognostic indicators include anxiety and hypertension.   Patent will benefit from skilled physical therapy at this time to address deficits to improve overall function and return to PLOF. Patient verbalized understanding of POC, HEP, and return demonstrated HEP. All questions were answered to patients satisfaction. Please contact me if you have any questions or recommendations. Thank you for the referral and the opportunity to share in [address] care. Impairments: abnormal coordination, abnormal gait, abnormal muscle firing, abnormal or restricted ROM, abnormal movement, activity intolerance, impaired balance, impaired physical strength, lacks appropriate home exercise program, pain with function, safety issue, weight-bearing intolerance and poor body mechanics  Understanding of Dx/Px/POC: good   Prognosis: good    Goals  Impairment Goals 4-6 weeks   In order to maximize function patient will be able to. ..   - Decrease intensity/duration/frequency of pain to 2/10 MET  - Demonstrate 20 degrees of Dorsiflexion for normal gait PROGRESSING  - Demonstrate 60 degrees of great toe extension for normal gait MET  - Increase hip strength to 5/5 throughout PROGRESSING  - Increase ankle strength to 5/5 throughout PROGRESSING  - Demonstrate a SLS on uneven and even surfaces for 30 seconds without compensations MET    Functional Goals 6-8 weeks  In order to return to prior level of function patient will be able to. ..   - Participate in ADL's/IADL's/sport specific activities with no greater than 2/10 pain. PARTIALLY MET  - Increase Functional Status Measure (FOTO) to predicted outcome scores MET  - Demonstrate independence and compliant with HEP MET  - Demonstrate a squat and or sit to stand with good mechanics and eccentric control without pain/difficulty/compensation PROGRESSING  - Ascend and descend stairs without increased pain/compensation/difficulty and a reciprocal gait pattern. PARTIALLY MET  - Patient will be able to demonstrate good gait mechanics without compensations.  PROGRESSING  - Ambulate without a rolling walker by d/c MET    NEW GOALS 9/22/23  - Patient will demonstrate 5/5 in all ankle MMTs in 4-6 weeks to improve ability to perform ADLs. - Patient will be able to descend stairs with 0/10 pain in 4-6 weeks to improve quality of life. Plan  Patient would benefit from: skilled PT  Planned modality interventions: cryotherapy, electrical stimulation/Russian stimulation, TENS and thermotherapy: hydrocollator packs  Planned therapy interventions: joint mobilization, manual therapy, neuromuscular re-education, patient education, strengthening, stretching, therapeutic activities, therapeutic exercise, home exercise program, functional ROM exercises, Ward taping, postural training, gait training, balance, balance/weight bearing training, flexibility, graded exercise and motor coordination training  Frequency: 1x week  Duration in weeks: 6  Treatment plan discussed with: patient        Subjective Evaluation    History of Present Illness  Mechanism of injury: RE 9/22/23: Patient presents to re-evaluation following an acute lateral ankle fracture. Patient performed a lot of housework on Wednesday and still has residual swelling, but patient noted swelling has gone down. Patient reports having no difficulty with walking, going up stairs and putting weight on his involved leg. Still struggles to descend stairs and perform prolonged housework. Patient reports feeling 80% improved overall. He feels he can continue to improve to work on walking down stairs functional endurance with his housework. Patient believes he would continue to benefit from PT at once per week. Problem list:  1.) descending stairs  2.) ankle strength    IE: Patient presents to PT after an ankle fracture 7/12/23. Patient was mowing the lawn for his son and fell down a hill and went down and he heard a pop. Patient went to [facility] the next day, and is treating it conservatively, because he didn't displace.  He has been in a cast for 3 weeks, air cast a week ago and walking with a walker, and partial WB. Single malleolar fracture. He does note some anxiety about WB on his foot. Difficulty with: walking, climbing stairs, WB. Patient Goals  Patient goals for therapy: decreased pain, independence with ADLs/IADLs and return to sport/leisure activities  Patient goal: walk normal, helping his son, biking   (progressing)  Pain  Current pain ratin  At worst pain ratin (tinge in heal)  Location: ankle   Quality: sharp and tight  Relieving factors: medications (aspirin)    Social Support  Stairs in house: yes       Diagnostic Tests  X-ray: abnormal  Treatments  No previous or current treatments        Objective     Active Range of Motion   Left Ankle/Foot   Dorsiflexion (ke): 0 degrees   Dorsiflexion (kf): -6 degrees   Plantar flexion: 70 degrees   Inversion: 12 degrees   Eversion: 18 degrees   Great toe extension: 62 degrees     Right Ankle/Foot   Dorsiflexion (ke): 10 degrees   Dorsiflexion (kf): 10 degrees   Plantar flexion: 72 degrees   Inversion: 32 degrees   Eversion: 10 degrees   Great toe extension: 75 degrees     Strength/Myotome Testing     Left Ankle/Foot   Dorsiflexion: 4+  Plantar flexion: 3 (compensations with lumbar spine and hips, 2 )  Inversion: 5  Eversion: 5  Great toe extension: 4+    Right Ankle/Foot   Normal strength    Additional Strength Details  Hip strength: R WNL   L glute med 4+/5  L glute max 4/5  L hip abductor 4+/5  L hamstring 4+/5  L quad 5/5       L Ant Tib - 5/5  L Post Tib 4/5  L Fib Brev 5/5  L Fib Long 4+/5    General Comments:       Ankle/Foot Comments   Some minor swelling noted during exam.     Treatment assisted by ROMAIN Tran with direct supervision by Arielle Woods PT, DPT         Diagnosis:  L ankle fx 23   Precautions:  WB    Comparable signs 1) Walking   2) Stairs    Primary Impairments: 1) impaired ankle mobility   2) Impaired ankle strength   Patient Goals Walk normally    Manual Therapy 9/8  9/11 9/15 9/18 9/22 Dorsiflexion mobs TJ GII- GIV TJ GII-GIV TJ GIII-GIV TJ GIII-GIV     Mid foot mobs Assessed and PROM Assessed and PROM  Assessed & PROM Assessed & PROM     Rear foot mobs  Assessed and PROM Assessed & PROM Assessed & PROM     GTE mobs          Taping  Leuko tape DF block         IASTM          Exercise Diary          Therapeutic Exercise         Bike for ROM/strength  5'  5' 5' 5' 5'    G/S stretch   Standing 10"x10 ea (modified for soleus) Standing 10"x10   (used half foam for soleus MWM)     HR  Standing 20x neutral position Standing 20x neutral/toes in/toes out Standing 20x neutral/toes in/toes out  -Standing 20x weight shift onto left  -Standing, upon 2, down on L 5x -Standing 2x5 up on 2, down on L    PF stretch          Nerve glides  1x10 neutral/toes in/toes out 1x10 neutral/toes in/toes out       Seated HR   3x10 15#  2x10 20#              Neuromuscular Re-education         Baps          Short foot          Toe yoga          Frog picks up          Isometrics          SLS   3x30" on foam 3x30" on foam     Wobble board 1 min ea way  2 min ea way       Rebounder   RMB 2x10 w/ R toes down RMB x20 w R toes down     Walking w/ cane         Side Steps  Red loop at ankles: 2 laps at mirror  RTB @ mirror: 3 laps  RTB around midfoot: 3 laps RTB around midfoot: 6 laps @ side of table     Standing Hip extension  Standing 2# 2x10  Standing 2# 2x10       S/L Hip abduction Standin# 2x10         Therapeutic Activities         Education    DF self-mobs for HEP       Gait retraining          Modalities

## 2023-09-22 NOTE — LETTER
2023    JUAN Murry    Patient: Car George   YOB: 1960   Date of Visit: 2023     Encounter Diagnosis     ICD-10-CM    1. Malleolar fracture, left, closed, with routine healing, subsequent encounter  S82.892D           Dear Dr. An Sears:    Thank you for your recent referral of Car George. Please review the attached evaluation summary from Tai's recent visit. Please verify that you agree with the plan of care by signing the attached order. If you have any questions or concerns, please do not hesitate to call. I sincerely appreciate the opportunity to share in the care of one of your patients and hope to have another opportunity to work with you in the near future. Sincerely,    Abilio Ba, PT      Referring Provider:      I certify that I have read the below Plan of Care and certify the need for these services furnished under this plan of treatment while under my care. Katy Cao DPM  Jd [facility]  Via Fax: [phone]          PT Re-Evaluation     Today's date: 2023  Patient name: Car George  : 1960  MRN: [id number]  Referring provider: Katy Cao DPM  Dx:   Encounter Diagnosis     ICD-10-CM    1. Malleolar fracture, left, closed, with routine healing, subsequent encounter  S82.892D           Start Time: 0830  Stop Time: 0915  Total time in clinic (min): 45 minutes    Assessment  Assessment details: RE 23: Patient presents to PT for a re-evaluation for an acute L ankle fracture that occurred on 23. Patient demonstrates improvements with ankle ROM and strength and functional activity, but still has ROM and strength limitations that need to be addressed.  These deficits are preventing Tai from descending stairs comfortably and performing ADLs without causing pain or swelling. Patient has been compliant with HEP and would continue to benefit from skilled PT with one visit per week to address these deficits. IE: Reginald Duque is a pleasant 59 y.o. male presents with signs and symptoms consistent with:   L malleolar fracture 7/12/23    Problem List:  1) Impaired ROM  2) impaired strength and balance     Comparable signs:  1) Walking  2) Stairs    he has impaired ROM and strength secondary to immobilization and ankle fracture. He notes impairments in gait and balance resulting in worry over not knowing what's wrong and fear of not being able to keep active. These impairments listed above are preventing the patient from participating in functional activity. No further referral appears necessary at this time based upon examination results, and is negative for any red flags. Prognosis is good based off HEP compliance and attendance to physical therapy 2x a week. Positive prognostic indicators include positive attitude toward recovery. Negative prognostic indicators include anxiety and hypertension. Patent will benefit from skilled physical therapy at this time to address deficits to improve overall function and return to PLOF. Patient verbalized understanding of POC, HEP, and return demonstrated HEP. All questions were answered to patients satisfaction. Please contact me if you have any questions or recommendations. Thank you for the referral and the opportunity to share in [address] care. Impairments: abnormal coordination, abnormal gait, abnormal muscle firing, abnormal or restricted ROM, abnormal movement, activity intolerance, impaired balance, impaired physical strength, lacks appropriate home exercise program, pain with function, safety issue, weight-bearing intolerance and poor body mechanics  Understanding of Dx/Px/POC: good   Prognosis: good    Goals  Impairment Goals 4-6 weeks   In order to maximize function patient will be able to. .. - Decrease intensity/duration/frequency of pain to 2/10 MET  - Demonstrate 20 degrees of Dorsiflexion for normal gait PROGRESSING  - Demonstrate 60 degrees of great toe extension for normal gait MET  - Increase hip strength to 5/5 throughout PROGRESSING  - Increase ankle strength to 5/5 throughout PROGRESSING  - Demonstrate a SLS on uneven and even surfaces for 30 seconds without compensations MET    Functional Goals 6-8 weeks  In order to return to prior level of function patient will be able to. ..   - Participate in ADL's/IADL's/sport specific activities with no greater than 2/10 pain. PARTIALLY MET  - Increase Functional Status Measure (FOTO) to predicted outcome scores MET  - Demonstrate independence and compliant with HEP MET  - Demonstrate a squat and or sit to stand with good mechanics and eccentric control without pain/difficulty/compensation PROGRESSING  - Ascend and descend stairs without increased pain/compensation/difficulty and a reciprocal gait pattern. PARTIALLY MET  - Patient will be able to demonstrate good gait mechanics without compensations. PROGRESSING  - Ambulate without a rolling walker by d/c MET    NEW GOALS 9/22/23  - Patient will demonstrate 5/5 in all ankle MMTs in 4-6 weeks to improve ability to perform ADLs. - Patient will be able to descend stairs with 0/10 pain in 4-6 weeks to improve quality of life.          Plan  Patient would benefit from: skilled PT  Planned modality interventions: cryotherapy, electrical stimulation/Russian stimulation, TENS and thermotherapy: hydrocollator packs  Planned therapy interventions: joint mobilization, manual therapy, neuromuscular re-education, patient education, strengthening, stretching, therapeutic activities, therapeutic exercise, home exercise program, functional ROM exercises, Ward taping, postural training, gait training, balance, balance/weight bearing training, flexibility, graded exercise and motor coordination training  Frequency: 1x week  Duration in weeks: 6  Treatment plan discussed with: patient        Subjective Evaluation    History of Present Illness  Mechanism of injury: RE 23: Patient presents to re-evaluation following an acute lateral ankle fracture. Patient performed a lot of housework on Wednesday and still has residual swelling, but patient noted swelling has gone down. Patient reports having no difficulty with walking, going up stairs and putting weight on his involved leg. Still struggles to descend stairs and perform prolonged housework. Patient reports feeling 80% improved overall. He feels he can continue to improve to work on walking down stairs functional endurance with his housework. Patient believes he would continue to benefit from PT at once per week. Problem list:  1.) descending stairs  2.) ankle strength    IE: Patient presents to PT after an ankle fracture 23. Patient was mowing the lawn for his son and fell down a hill and went down and he heard a pop. Patient went to [facility] the next day, and is treating it conservatively, because he didn't displace. He has been in a cast for 3 weeks, air cast a week ago and walking with a walker, and partial WB. Single malleolar fracture. He does note some anxiety about WB on his foot. Difficulty with: walking, climbing stairs, WB.    Patient Goals  Patient goals for therapy: decreased pain, independence with ADLs/IADLs and return to sport/leisure activities  Patient goal: walk normal, helping his son, biking   (progressing)  Pain  Current pain ratin  At worst pain ratin (tinge in heal)  Location: ankle   Quality: sharp and tight  Relieving factors: medications (aspirin)    Social Support  Stairs in house: yes       Diagnostic Tests  X-ray: abnormal  Treatments  No previous or current treatments        Objective     Active Range of Motion   Left Ankle/Foot   Dorsiflexion (ke): 0 degrees   Dorsiflexion (kf): -6 degrees   Plantar flexion: 70 degrees Inversion: 12 degrees   Eversion: 18 degrees   Great toe extension: 62 degrees     Right Ankle/Foot   Dorsiflexion (ke): 10 degrees   Dorsiflexion (kf): 10 degrees   Plantar flexion: 72 degrees   Inversion: 32 degrees   Eversion: 10 degrees   Great toe extension: 75 degrees     Strength/Myotome Testing     Left Ankle/Foot   Dorsiflexion: 4+  Plantar flexion: 3 (compensations with lumbar spine and hips, 2 )  Inversion: 5  Eversion: 5  Great toe extension: 4+    Right Ankle/Foot   Normal strength    Additional Strength Details  Hip strength: R WNL   L glute med 4+/5  L glute max 4/5  L hip abductor 4+/5  L hamstring 4+/5  L quad 5/5       L Ant Tib - 5/5  L Post Tib 4/5  L Fib Brev 5/5  L Fib Long 4+/5    General Comments:       Ankle/Foot Comments   Some minor swelling noted during exam.     Treatment assisted by ORMAIN Small with direct supervision by Dean Jenkins PT, DPT        Diagnosis:  L ankle fx 7/12/23   Precautions:  WB    Comparable signs 1) Walking   2) Stairs    Primary Impairments: 1) impaired ankle mobility   2) Impaired ankle strength   Patient Goals Walk normally    Manual Therapy 9/8  9/11 9/15 9/18 9/22    Dorsiflexion mobs TJ GII- GIV TJ GII-GIV TJ GIII-GIV TJ GIII-GIV     Mid foot mobs Assessed and PROM Assessed and PROM  Assessed & PROM Assessed & PROM     Rear foot mobs  Assessed and PROM Assessed & PROM Assessed & PROM     GTE mobs          Taping  Leuko tape DF block         IAS          Exercise Diary          Therapeutic Exercise         Bike for ROM/strength  5'  5' 5' 5' 5'    G/S stretch   Standing 10"x10 ea (modified for soleus) Standing 10"x10   (used half foam for soleus MWM)     HR  Standing 20x neutral position Standing 20x neutral/toes in/toes out Standing 20x neutral/toes in/toes out  -Standing 20x weight shift onto left  -Standing, upon 2, down on L 5x -Standing 2x5 up on 2, down on L    PF stretch          Nerve glides  1x10 neutral/toes in/toes out 1x10 neutral/toes in/toes out       Seated HR   3x10 15#  2x10 20#              Neuromuscular Re-education         Baps          Short foot          Toe yoga          Frog picks up          Isometrics          SLS   3x30" on foam 3x30" on foam     Wobble board 1 min ea way  2 min ea way       Rebounder   RMB 2x10 w/ R toes down RMB x20 w R toes down     Walking w/ cane         Side Steps  Red loop at ankles: 2 laps at mirror  RTB @ mirror: 3 laps  RTB around midfoot: 3 laps RTB around midfoot: 6 laps @ side of table     Standing Hip extension  Standing 2# 2x10  Standing 2# 2x10       S/L Hip abduction Standin# 2x10         Therapeutic Activities         Education    DF self-mobs for HEP       Gait retraining          Modalities

## 2023-09-24 DIAGNOSIS — I10 BENIGN ESSENTIAL HYPERTENSION: ICD-10-CM

## 2023-09-25 ENCOUNTER — APPOINTMENT (OUTPATIENT)
Dept: PHYSICAL THERAPY | Facility: CLINIC | Age: 63
End: 2023-09-25
Payer: COMMERCIAL

## 2023-09-25 RX ORDER — METOPROLOL TARTRATE 50 MG/1
50 TABLET, FILM COATED ORAL 2 TIMES DAILY
Qty: 60 TABLET | Refills: 0 | Status: SHIPPED | OUTPATIENT
Start: 2023-09-25

## 2023-09-29 ENCOUNTER — OFFICE VISIT (OUTPATIENT)
Dept: PHYSICAL THERAPY | Facility: CLINIC | Age: 63
End: 2023-09-29
Payer: COMMERCIAL

## 2023-09-29 DIAGNOSIS — S82.892D: Primary | ICD-10-CM

## 2023-09-29 PROCEDURE — 97530 THERAPEUTIC ACTIVITIES: CPT

## 2023-09-29 PROCEDURE — 97110 THERAPEUTIC EXERCISES: CPT

## 2023-09-29 PROCEDURE — 97112 NEUROMUSCULAR REEDUCATION: CPT

## 2023-09-29 NOTE — PROGRESS NOTES
Daily Note     Today's date: 2023  Patient name: Skylar Cerna  : 1960  MRN: [id number]  Referring provider: Vandana Bonilla DPM  Dx:   Encounter Diagnosis     ICD-10-CM    1. Malleolar fracture, left, closed, with routine healing, subsequent encounter  S82.892D           Start Time: 0745  Stop Time: 0830  Total time in clinic (min): 45 minutes    Subjective: Patient reports feeling good since his re-evaluation. He installed a couple sinks and attended a  earlier this week and his ankle responded well to walking on uneven ground. The only issue he is still having is stepping down stairs onto his left leg. Objective: See treatment diary below      Assessment: Patient tolerated treatment well. Patient step down was assessed with over-pronation noted when landing. Patient responded well to posterior tib heel raises to help increase supination. Patient tolerated the addition of short foot and x-walks. Billing accounted for rest breaks taken between exercises. Patient demonstrated fatigue following treatment and would benefit from continued PT. Plan: Continue working on LE strengthening and balance to improve proprioception and return to PLOF.      Treatment assisted by Radha David SPT with direct supervision by Reddy Cazares PT, DPT     Diagnosis:  L ankle fx 23   Precautions:  WB    Comparable signs 1) Walking   2) Stairs    Primary Impairments: 1) impaired ankle mobility   2) Impaired ankle strength   Patient Goals Walk normally    Manual Therapy 9/8  9/11 9/15 9/18 9/22 9/29   Dorsiflexion mobs TJ GII- GIV TJ GII-GIV TJ GIII-GIV TJ GIII-GIV     Mid foot mobs Assessed and PROM Assessed and PROM  Assessed & PROM Assessed & PROM     Rear foot mobs  Assessed and PROM Assessed & PROM Assessed & PROM     GTE mobs          Taping  Leuko tape DF block         IASTM          Exercise Diary          Therapeutic Exercise         Bike for ROM/strength  5'  5' 5' 5' 5' 5'   Post Tib HR      2x10 w/ baseball   G/S stretch   Standing 10"x10 ea (modified for soleus) Standing 10"x10   (used half foam for soleus MWM)     HR  Standing 20x neutral position Standing 20x neutral/toes in/toes out Standing 20x neutral/toes in/toes out  -Standing 20x weight shift onto left  -Standing, upon 2, down on L 5x -Standing 2x5 up on 2, down on L    PF stretch          Nerve glides  1x10 neutral/toes in/toes out 1x10 neutral/toes in/toes out       Seated HR   3x10 15#  2x10 20#  20# 2x15            Neuromuscular Re-education         Baps          Short foot       2x10   Toe yoga          Frog picks up          Isometrics       Post tib, seated figure-4 1x10   SLS   3x30" on foam 3x30" on foam     Wobble board 1 min ea way  2 min ea way       Rebounder   RMB 2x10 w/ R toes down RMB x20 w R toes down     Walking w/ cane         Side Steps  Red loop at ankles: 2 laps at mirror  RTB @ mirror: 3 laps  RTB around midfoot: 3 laps RTB around midfoot: 6 laps @ side of table     Standing Hip extension  Standing 2# 2x10  Standing 2# 2x10       S/L Hip abduction Standin# 2x10         Therapeutic Activities         Education    DF self-mobs for HEP       X-Walks      BTB 2 laps   Gait retraining          Modalities

## 2023-10-06 ENCOUNTER — OFFICE VISIT (OUTPATIENT)
Dept: PHYSICAL THERAPY | Facility: CLINIC | Age: 63
End: 2023-10-06
Payer: COMMERCIAL

## 2023-10-06 DIAGNOSIS — S82.892D: Primary | ICD-10-CM

## 2023-10-06 PROCEDURE — 97112 NEUROMUSCULAR REEDUCATION: CPT

## 2023-10-06 PROCEDURE — 97110 THERAPEUTIC EXERCISES: CPT | Performed by: PHYSICAL THERAPIST

## 2023-10-06 PROCEDURE — 97140 MANUAL THERAPY 1/> REGIONS: CPT

## 2023-10-06 NOTE — PROGRESS NOTES
Daily Note     Today's date: 10/6/2023  Patient name: Dago Lim  : 1960  MRN: [id number]  Referring provider: Joshua Friedman DPM  Dx:   Encounter Diagnosis     ICD-10-CM    1. Malleolar fracture, left, closed, with routine healing, subsequent encounter  S82.931P                      Subjective: Patient still continues to be challenged with stepping down, however stairs are getting better     Objective: See treatment diary below      Assessment: Patient tolerated treatment well. Focused primarily on ankle impingement compliant today with improvement after test re-test. He is still challenged with ankle dorsiflexion end range. Able to tolerate step stretching today with slight discomfort with soleus. He notes mild neuromuscular fatigue with soleus today. Challenged with creepers. Educated to work into discomfort     Plan: Continue working on LE strengthening and balance to improve proprioception and return to PLOF.           Diagnosis:  L ankle fx 23   Precautions:  WB    Comparable signs 1) Walking   2) Stairs    Primary Impairments: 1) impaired ankle mobility   2) Impaired ankle strength   Patient Goals Walk normally    Manual Therapy 10/6  9/15 9/18 9/22 9/29   Dorsiflexion mobs SP GII-GIV   MWM 2x10  TJ GIII-GIV TJ GIII-GIV     Mid foot mobs Cuboid SP GII-GIV   Assessed & PROM Assessed & PROM     Rear foot mobs   Assessed & PROM Assessed & PROM     GTE mobs          Taping          IASTM          Exercise Diary          Therapeutic Exercise         Bike for ROM/strength  TM for gait 5'   5' 5' 5' 5'   Post Tib HR      2x10 w/ baseball   G/S stretch Standing stairs 10x10" ea   Standing 10"x10 ea (modified for soleus) Standing 10"x10   (used half foam for soleus MWM)     HR    Standing 20x neutral/toes in/toes out  -Standing 20x weight shift onto left  -Standing, upon 2, down on L 5x -Standing 2x5 up on 2, down on L    PF stretch          Nerve glides          Seated HR   3x10 15#  2x10 20# 20# 2x15            Neuromuscular Re-education         Baps          Short foot       2x10   Toe yoga          Frog picks up          Isometrics       Post tib, seated figure-4 1x10   SLS Around the world 2x10 5#  3x30" on foam 3x30" on foam     Wobble board         Rebounder   RMB 2x10 w/ R toes down RMB x20 w R toes down     Walking w/ cane         Side Steps  RTB w/ SF 2 laps   RTB around midfoot: 3 laps RTB around midfoot: 6 laps @ side of table     Standing Hip extension          S/L Hip abduction         Therapeutic Activities         Education          Creepers  2 laps 5#, 10#        Stool scoots   2 laps SL 10#         X-Walks      BTB 2 laps   Stairs  Reciprical gait pattern x4        Modalities

## 2023-10-13 ENCOUNTER — OFFICE VISIT (OUTPATIENT)
Dept: PHYSICAL THERAPY | Facility: CLINIC | Age: 63
End: 2023-10-13
Payer: COMMERCIAL

## 2023-10-13 DIAGNOSIS — S82.892D: Primary | ICD-10-CM

## 2023-10-13 PROCEDURE — 97140 MANUAL THERAPY 1/> REGIONS: CPT

## 2023-10-13 PROCEDURE — 97110 THERAPEUTIC EXERCISES: CPT

## 2023-10-13 PROCEDURE — 97530 THERAPEUTIC ACTIVITIES: CPT

## 2023-10-13 NOTE — PROGRESS NOTES
Daily Note     Today's date: 10/13/2023  Patient name: Shady Hopkins  : 1960  MRN: [id number]  Referring provider: Haider Page DPM  Dx:   Encounter Diagnosis     ICD-10-CM    1. Malleolar fracture, left, closed, with routine healing, subsequent encounter  S82.892D                      Subjective: Patient still continues to be challenged with stepping down, however stairs are getting better     Objective: See treatment diary below      Assessment: Patient tolerated treatment well. Continue to address anterior impingement with mobilizations. Cued for good gait mechanics with stairs to allow for proper toe off. Patient had moderate challenge with push off today and difficulty with functional dorsiflexion. Trailed EPAT today to decrease symptoms and address impingement like symptoms at talocrural joint. Patient able to perform multiple stairs with reciprocal gait pattern with decreased symptoms. And good gait mechanics after EPAT. Plan: Continue working on LE strengthening and balance to improve proprioception and return to PLOF.           Diagnosis:  L ankle fx 23   Precautions:  WB    Comparable signs 1) Walking   2) Stairs    Primary Impairments: 1) impaired ankle mobility   2) Impaired ankle strength   Patient Goals Walk normally    Manual Therapy 10/6 10/13  9/18 9/22 9/29   Dorsiflexion mobs SP GII-GIV   MWM 2x10 SP GII-GIV   MWM 2x10  TJ GIII-GIV     Mid foot mobs Cuboid SP GII-GIV    Assessed & PROM     Rear foot mobs    Assessed & PROM     GTE mobs   SP GII-GIV        EPAT   Ant tib x2 ceramic head        IASTM          Exercise Diary          Therapeutic Exercise         Bike for ROM/strength  TM for gait 5'  5'   5' 5' 5'   Post Tib HR      2x10 w/ baseball   G/S stretch Standing stairs 10x10" ea  10x10"   Standing 10"x10   (used half foam for soleus MWM)     HR     -Standing 20x weight shift onto left  -Standing, upon 2, down on L 5x -Standing 2x5 up on 2, down on L    PF stretch Nerve glides          Seated HR    2x10 20#  20# 2x15   Standing soleus MWM   On foam roller 20x        Neuromuscular Re-education         Baps          Short foot       2x10   Toe yoga          Frog picks up          Isometrics       Post tib, seated figure-4 1x10   SLS Around the world 2x10 5#   3x30" on foam     Wobble board         Rebounder    RMB x20 w R toes down     Walking w/ cane         Side Steps  RTB w/ SF 2 laps    RTB around midfoot: 6 laps @ side of table     Standing Hip extension          S/L Hip abduction         Therapeutic Activities         Education          Creepers  2 laps 5#, 10#        Stool scoots   2 laps SL 10#         X-Walks      BTB 2 laps   Stairs  Reciprical gait pattern x4 Reciprocal gait pattern 2x5       Modalities

## 2023-10-20 ENCOUNTER — OFFICE VISIT (OUTPATIENT)
Dept: PHYSICAL THERAPY | Facility: CLINIC | Age: 63
End: 2023-10-20
Payer: COMMERCIAL

## 2023-10-20 DIAGNOSIS — S82.892D: Primary | ICD-10-CM

## 2023-10-20 PROCEDURE — 97110 THERAPEUTIC EXERCISES: CPT

## 2023-10-20 PROCEDURE — 97140 MANUAL THERAPY 1/> REGIONS: CPT

## 2023-10-20 PROCEDURE — 97112 NEUROMUSCULAR REEDUCATION: CPT

## 2023-10-20 NOTE — PROGRESS NOTES
Daily Note     Today's date: 10/20/2023  Patient name: Seferino Ward  : 1960  MRN: [id number]  Referring provider: Farhana Thao DPM  Dx:   Encounter Diagnosis     ICD-10-CM    1. Malleolar fracture, left, closed, with routine healing, subsequent encounter  S82.892D                      Subjective: Patient notes a slight twinge with step down, but only after the third step. Objective: See treatment diary below      Assessment: Patient tolerated treatment well. Continue to address anterior impingement with mobilizations. Noted compensation with EHL with anterior tib dorsiflexion. Anterior and posterior tibialis strengthening added to allow fro motor control into the range of dorsiflexion. Patient noted good improvement with EPAT with descending stairs. Patient progressing well towards goals. Anterior/posterior tib strengthening added in for HEP. Plan: Continue working on LE strengthening and balance to improve proprioception and return to PLOF.           Diagnosis:  L ankle fx 23   Precautions:  WB    Comparable signs 1) Walking   2) Stairs    Primary Impairments: 1) impaired ankle mobility   2) Impaired ankle strength   Patient Goals Walk normally    Manual Therapy 10/6 10/13 10/20 9/18 9/22 9/29   Dorsiflexion mobs SP GII-GIV   MWM 2x10 SP GII-GIV   MWM 2x10 SP GII-GIV   MWM 2x10  TJ GIII-GIV     Mid foot mobs Cuboid SP GII-GIV    Assessed & PROM     Rear foot mobs    Assessed & PROM     GTE mobs   SP GII-GIV        EPAT   Ant tib x2 ceramic head  Ant tib x2   Ceramic head      IASTM          Exercise Diary          Therapeutic Exercise         Bike for ROM/strength  TM for gait 5'  5'  5'  5' 5' 5'   Post Tib HR      2x10 w/ baseball   G/S stretch Standing stairs 10x10" ea  10x10"   Standing 10"x10   (used half foam for soleus MWM)     HR     -Standing 20x weight shift onto left  -Standing, upon 2, down on L 5x -Standing 2x5 up on 2, down on L    PF stretch          Nerve glides Seated HR    2x10 20#  20# 2x15   Standing soleus MWM   On foam roller 20x        Neuromuscular Re-education         Baps          Short foot       2x10   Anterior tib   2x12 GTB toes curled      Posterior tib   2x10 standing GMB       Isometrics       Post tib, seated figure-4 1x10   SLS Around the [place] 2x10 5#   3x30" on foam     Wobble board   SL KB passes 2x10 5#      Rebounder    RMB x20 w R toes down     Walking w/ cane         Side Steps  RTB w/ SF 2 laps    RTB around midfoot: 6 laps @ side of table     Standing Hip extension          S/L Hip abduction         Therapeutic Activities         Education          Creepers  2 laps 5#, 10#        Stool scoots   2 laps SL 10#         X-Walks   Toes up YTB 2 laps   BTB 2 laps   Stairs  Reciprical gait pattern x4 Reciprocal gait pattern 2x5       Modalities

## 2023-10-27 ENCOUNTER — OFFICE VISIT (OUTPATIENT)
Dept: PHYSICAL THERAPY | Facility: CLINIC | Age: 63
End: 2023-10-27
Payer: COMMERCIAL

## 2023-10-27 DIAGNOSIS — S82.892D: Primary | ICD-10-CM

## 2023-10-27 PROCEDURE — 97112 NEUROMUSCULAR REEDUCATION: CPT

## 2023-10-27 PROCEDURE — 97140 MANUAL THERAPY 1/> REGIONS: CPT

## 2023-10-27 PROCEDURE — 97110 THERAPEUTIC EXERCISES: CPT

## 2023-10-27 NOTE — PROGRESS NOTES
Daily Note     Today's date: 10/27/2023  Patient name: Tamera Medina  : 1960  MRN: [id number]  Referring provider: Tyler Austin DPM  Dx:   Encounter Diagnosis     ICD-10-CM    1. Malleolar fracture, left, closed, with routine healing, subsequent encounter  S82.242L                      Subjective: Patient noted steps were doing well. Objective: See treatment diary below      Assessment: Patient tolerated treatment well. Progressed with single leg balance exercise. Patient able to tolerated progression well. Improved single leg heel raises with less discomfort. Patient needed UE support for higher level balance but good tolerance to uneven surface today with minimal discomfort. Plan: Continue working on LE strengthening and balance to improve proprioception and return to PLOF. Potential discharge next week.           Diagnosis:  L ankle fx 23   Precautions:  WB    Comparable signs 1) Walking   2) Stairs    Primary Impairments: 1) impaired ankle mobility   2) Impaired ankle strength   Patient Goals Walk normally    Manual Therapy 10/6 10/13 10/20 10/27  9/29   Dorsiflexion mobs SP GII-GIV   MWM 2x10 SP GII-GIV   MWM 2x10 SP GII-GIV   MWM 2x10       Mid foot mobs Cuboid SP GII-GIV         Rear foot mobs         GTE mobs   SP GII-GIV        EPAT   Ant tib x2 ceramic head  Ant tib x2   Ceramic head Ant tib x2      IASTM          Exercise Diary          Therapeutic Exercise         Bike for ROM/strength  TM for gait 5'  5'  5'  5' TM  5'   Post Tib HR      2x10 w/ baseball   G/S stretch Standing stairs 10x10" ea  10x10"        HR          PF stretch          Nerve glides          Seated HR      20# 2x15   Standing soleus MWM   On foam roller 20x        Neuromuscular Re-education         Baps          Short foot       2x10   Anterior tib   2x12 GTB toes curled 2x12 BTB toes curled      Posterior tib   2x10 standing GMB  On foam RMB 2x10     Isometrics       Post tib, seated figure-4 1x10   SLS Around the world 2x10 5#        Wobble board   SL KB passes 2x10 5# SL KB passes 2x10 10#     Rebounder    YMB 2x10     Walking w/ cane         Side Steps  RTB w/ SF 2 laps         Standing Hip extension          S/L Hip abduction         Therapeutic Activities         Education          Creepers  2 laps 5#, 10#        Stool scoots   2 laps SL 10#         X-Walks   Toes up YTB 2 laps Toes up RTB 2 laps  BTB 2 laps   Stairs  Reciprical gait pattern x4 Reciprocal gait pattern 2x5       Modalities

## 2023-11-03 ENCOUNTER — OFFICE VISIT (OUTPATIENT)
Dept: PHYSICAL THERAPY | Facility: CLINIC | Age: 63
End: 2023-11-03
Payer: COMMERCIAL

## 2023-11-03 DIAGNOSIS — I10 BENIGN ESSENTIAL HYPERTENSION: ICD-10-CM

## 2023-11-03 DIAGNOSIS — S82.892D: Primary | ICD-10-CM

## 2023-11-03 PROCEDURE — 97140 MANUAL THERAPY 1/> REGIONS: CPT

## 2023-11-03 PROCEDURE — 97530 THERAPEUTIC ACTIVITIES: CPT

## 2023-11-03 RX ORDER — METOPROLOL TARTRATE 50 MG/1
50 TABLET, FILM COATED ORAL 2 TIMES DAILY
Qty: 180 TABLET | Refills: 1 | Status: SHIPPED | OUTPATIENT
Start: 2023-11-03

## 2023-11-03 NOTE — PROGRESS NOTES
PT Re-Evaluation     Today's date: 11/3/2023  Patient name: Chris Gabriel  : 1960  MRN: [id number]  Referring provider: Vale Isabel DPM  Dx:   Encounter Diagnosis     ICD-10-CM    1. Malleolar fracture, left, closed, with routine healing, subsequent encounter  S82.892D                      Assessment  Assessment details: RE 11/3/23: Patient has made good progress with PT and has met functional PT goals at this time. Patient will be discharged from skilled PT services and will continue to make progress with I HEP. Patients HEP was reviewed in order to ensure compliance and success at home, in which exercise bands and printouts were given. We will be available if the patient needs physical therapy in the future. Patient was given an opportunity to ask questions. All questions were answered to patients satisfaction. RE 23: Patient presents to PT for a re-evaluation for an acute L ankle fracture that occurred on 23. Patient demonstrates improvements with ankle ROM and strength and functional activity, but still has ROM and strength limitations that need to be addressed. These deficits are preventing Tai from descending stairs comfortably and performing ADLs without causing pain or swelling. Patient has been compliant with HEP and would continue to benefit from skilled PT with one visit per week to address these deficits. IE: Chris Gabriel is a pleasant 59 y.o. male presents with signs and symptoms consistent with:   L malleolar fracture 23    Problem List:  1) Impaired ROM  2) impaired strength and balance     Comparable signs:  1) Walking  2) Stairs    he has impaired ROM and strength secondary to immobilization and ankle fracture. He notes impairments in gait and balance resulting in worry over not knowing what's wrong and fear of not being able to keep active. These impairments listed above are preventing the patient from participating in functional activity.  No further referral appears necessary at this time based upon examination results, and is negative for any red flags. Prognosis is good based off HEP compliance and attendance to physical therapy 2x a week. Positive prognostic indicators include positive attitude toward recovery. Negative prognostic indicators include anxiety and hypertension. Patent will benefit from skilled physical therapy at this time to address deficits to improve overall function and return to PLOF. Patient verbalized understanding of POC, HEP, and return demonstrated HEP. All questions were answered to patients satisfaction. Please contact me if you have any questions or recommendations. Thank you for the referral and the opportunity to share in [address] care. Understanding of Dx/Px/POC: good   Prognosis: good    Goals  Impairment Goals 4-6 weeks MET   In order to maximize function patient will be able to. ..   - Decrease intensity/duration/frequency of pain to 2/10 MET  - Demonstrate 20 degrees of Dorsiflexion for normal gait PROGRESSING  - Demonstrate 60 degrees of great toe extension for normal gait MET  - Increase hip strength to 5/5 throughout PROGRESSING  - Increase ankle strength to 5/5 throughout PROGRESSING  - Demonstrate a SLS on uneven and even surfaces for 30 seconds without compensations MET    Functional Goals 6-8 weeks  In order to return to prior level of function patient will be able to. ..   - Participate in ADL's/IADL's/sport specific activities with no greater than 2/10 pain. PARTIALLY MET  - Increase Functional Status Measure (FOTO) to predicted outcome scores MET  - Demonstrate independence and compliant with HEP MET  - Demonstrate a squat and or sit to stand with good mechanics and eccentric control without pain/difficulty/compensation PROGRESSING  - Ascend and descend stairs without increased pain/compensation/difficulty and a reciprocal gait pattern.  PARTIALLY MET  - Patient will be able to demonstrate good gait mechanics without compensations. PROGRESSING  - Ambulate without a rolling walker by d/c MET    NEW GOALS 9/22/23 MET  - Patient will demonstrate 5/5 in all ankle MMTs in 4-6 weeks to improve ability to perform ADLs. - Patient will be able to descend stairs with 0/10 pain in 4-6 weeks to improve quality of life. Plan  Patient would benefit from: skilled PT  Planned therapy interventions: home exercise program  Treatment plan discussed with: patient        Subjective Evaluation    History of Present Illness  Mechanism of injury: RE 11/3/23: Patient states that he is 95% improved. He feels that he is back to normal. He feels that last 5% is the little tinge, but goes away with doing mobility work. Patient notes that he is doing well with stairs, prolonged house work has improved. He notes that he is able to walking down stairs is good. RE 9/22/23: Patient presents to re-evaluation following an acute lateral ankle fracture. Patient performed a lot of housework on Wednesday and still has residual swelling, but patient noted swelling has gone down. Patient reports having no difficulty with walking, going up stairs and putting weight on his involved leg. Still struggles to descend stairs and perform prolonged housework. Patient reports feeling 80% improved overall. He feels he can continue to improve to work on walking down stairs functional endurance with his housework. Patient believes he would continue to benefit from PT at once per week. Problem list:  1.) descending stairs  2.) ankle strength    IE: Patient presents to PT after an ankle fracture 7/12/23. Patient was mowing the lawn for his son and fell down a hill and went down and he heard a pop. Patient went to [facility] the next day, and is treating it conservatively, because he didn't displace. He has been in a cast for 3 weeks, air cast a week ago and walking with a walker, and partial WB. Single malleolar fracture.  He does note some anxiety about WB on his foot. Difficulty with: walking, climbing stairs, WB. Patient Goals  Patient goals for therapy: decreased pain, independence with ADLs/IADLs and return to sport/leisure activities  Patient goal: walk normal, helping his son, angela   (MET)  Pain  Current pain ratin  At worst pain ratin  Location: ankle   Quality: sharp and tight    Social Support  Stairs in house: yes       Diagnostic Tests  X-ray: abnormal  Treatments  No previous or current treatments        Objective     Active Range of Motion   Left Ankle/Foot   Dorsiflexion (ke): 15 degrees   Dorsiflexion (kf): 15 degrees   Plantar flexion: 70 degrees   Inversion: 40 degrees   Eversion: 15 degrees   Great toe extension: 70 degrees     Right Ankle/Foot   Dorsiflexion (ke): 15 degrees   Dorsiflexion (kf): 15 degrees   Plantar flexion: 72 degrees   Inversion: 32 degrees   Eversion: 10 degrees   Great toe extension: 75 degrees     Strength/Myotome Testing     Left Ankle/Foot   Dorsiflexion: 4+  Plantar flexion: 3 (compensations with lumbar spine and hips, 2 )  Inversion: 5  Eversion: 5  Great toe extension: 4+    Right Ankle/Foot   Normal strength    Additional Strength Details  Hip strength: R WNL   L glute med 4+/5  L glute max 4/5  L hip abductor 4+/5  L hamstring 4+/5  L quad 5/5       L Ant Tib - 5/5  L Post Tib 4/5  L Fib Brev 5/5  L Fib Long 4+/5    General Comments:       Ankle/Foot Comments   Some minor swelling noted during exam.     Diagnosis:  L ankle fx 23   Precautions:  WB    Comparable signs 1) Walking   2) Stairs    Primary Impairments: 1) impaired ankle mobility   2) Impaired ankle strength   Patient Goals Walk normally    Manual Therapy 10/6 10/13 10/20 10/27 11/3 9/29   Dorsiflexion mobs SP GII-GIV   MWM 2x10 SP GII-GIV   MWM 2x10 SP GII-GIV   MWM 2x10       Mid foot mobs Cuboid SP GII-GIV         Rear foot mobs         GTE mobs   SP GII-GIV        EPAT   Ant tib x2 ceramic head  Ant tib x2   Ceramic head Ant tib x2      Re-evaluation     SP    IAS          Exercise Diary          Therapeutic Exercise         Bike for ROM/strength  TM for gait 5'  5'  5'  5' TM 5' TM 5'   Post Tib HR      2x10 w/ baseball   G/S stretch Standing stairs 10x10" ea  10x10"        HR          PF stretch          Nerve glides          Seated HR      20# 2x15   Standing soleus MWM   On foam roller 20x        Neuromuscular Re-education         Baps          Short foot       2x10   Anterior tib   2x12 GTB toes curled 2x12 BTB toes curled      Posterior tib   2x10 standing GMB  On foam RMB 2x10     Isometrics       Post tib, seated figure-4 1x10   SLS Around the world 2x10 5#        Wobble board   SL KB passes 2x10 5# SL KB passes 2x10 10#     Rebounder    YMB 2x10     Walking w/ cane         Side Steps  RTB w/ SF 2 laps         Standing Hip extension          S/L Hip abduction         Therapeutic Activities         Education      HEP, DA, callus site    Creepers  2 laps 5#, 10#        Stool scoots   2 laps SL 10#         X-Walks   Toes up YTB 2 laps Toes up RTB 2 laps  BTB 2 laps   Stairs  Reciprical gait pattern x4 Reciprocal gait pattern 2x5       Modalities

## 2023-11-08 ENCOUNTER — OFFICE VISIT (OUTPATIENT)
Dept: SLEEP CENTER | Facility: CLINIC | Age: 63
End: 2023-11-08
Payer: COMMERCIAL

## 2023-11-08 VITALS
BODY MASS INDEX: 30.82 KG/M2 | SYSTOLIC BLOOD PRESSURE: 137 MMHG | DIASTOLIC BLOOD PRESSURE: 73 MMHG | WEIGHT: 185 LBS | HEIGHT: 65 IN | HEART RATE: 50 BPM

## 2023-11-08 DIAGNOSIS — G47.33 OSA (OBSTRUCTIVE SLEEP APNEA): Primary | ICD-10-CM

## 2023-11-08 PROCEDURE — 99213 OFFICE O/P EST LOW 20 MIN: CPT | Performed by: PSYCHIATRY & NEUROLOGY

## 2023-11-08 NOTE — PROGRESS NOTES
Assessment/Plan:    1. CECILIA (obstructive sleep apnea)  -     PAP DME Resupply/Reorder      Mr. Patricia Sims is doing great, he is consistently using his CPAP machine and treatment is beneficial and effective. No changes needed in his current settings. I will reorder supplies and follow-up with him in 1 year. Subjective:      Patient ID: Chris Gabriel is a 59 y.o. male. HPI    This is a 52-year-old male who returns in a follow-up visit, he has a history of obstructive sleep apnea treated with CPAP. He notes that he has been sleeping better since his last visit. In brief, he had a home sleep test in March 2023 which showed severe obstructive sleep apnea, respiratory event index 30, results worse in the supine position. At his last visit with me, we discussed a pressure increase may be helpful and his settings were changed to 7-17 cm H2O    Benefits- better sleep, dreaming again , no snoring, does not need to nap anymore    Machine data reviewed today  AirSense 11 set at 7-17 cm H2O  Average session 7 hours 35 minutes, usage 30 out of 30 days  AHI 2.7  95% pressure-12 cm H2O  No significant mask leakage  Uses N30     Denies possible CPAP side effects such as dry mouth, nasal congestion, nose bleeds, aerophagia, or nasal dryness. He has been going to bed at 11 PM, asleep in 1 hour, awake at 7 AM, sleeping 7 hours a night. Edson Sleepiness Scale score is 4. He denies drowsy driving. He is to nap after work, does not need to do so      Interval medical history is notable for an ankle fracture in July 2023.    2 cups of coffee a day  Alcohol- very rare     Edson Sleepiness Scale  Sitting and reading: Slight chance of dozing  Watching TV: Would never doze  Sitting, inactive in a public place (e.g. a theatre or a meeting):  Would never doze  As a passenger in a car for an hour without a break: Would never doze  Lying down to rest in the afternoon when circumstances permit: Moderate chance of dozing  Sitting and talking to someone: Would never doze  Sitting quietly after a lunch without alcohol: Slight chance of dozing  In a car, while stopped for a few minutes in traffic: Would never doze  Total score: 4      Review of Systems    As above  Objective:      /73 (BP Location: Left arm, Patient Position: Sitting, Cuff Size: Adult)   Pulse (!) 50   Ht 5' 5" (1.651 m)   Wt 83.9 kg (185 lb)   BMI 30.79 kg/m²          Physical Exam    RRR  CTA b/l  No edema

## 2023-11-09 ENCOUNTER — TELEPHONE (OUTPATIENT)
Dept: SLEEP CENTER | Facility: CLINIC | Age: 63
End: 2023-11-09

## 2023-11-09 NOTE — TELEPHONE ENCOUNTER
Rx for PAP resupply sent to [address] [place] [address] via [address] [facility].

## 2023-11-10 ENCOUNTER — APPOINTMENT (OUTPATIENT)
Dept: PHYSICAL THERAPY | Facility: CLINIC | Age: 63
End: 2023-11-10
Payer: COMMERCIAL

## 2023-11-13 LAB

## 2023-11-17 ENCOUNTER — APPOINTMENT (OUTPATIENT)
Dept: PHYSICAL THERAPY | Facility: CLINIC | Age: 63
End: 2023-11-17
Payer: COMMERCIAL

## 2023-11-22 ENCOUNTER — APPOINTMENT (OUTPATIENT)
Dept: PHYSICAL THERAPY | Facility: CLINIC | Age: 63
End: 2023-11-22
Payer: COMMERCIAL

## 2023-12-28 LAB
ALBUMIN SERPL-MCNC: 4.3 G/DL (ref 3.9–4.9)
ALBUMIN/CREAT UR: 4 MG/G CREAT (ref 0–29)
ALBUMIN/GLOB SERPL: 2 {RATIO} (ref 1.2–2.2)
ALP SERPL-CCNC: 59 IU/L (ref 44–121)
ALT SERPL-CCNC: 22 IU/L (ref 0–44)
AST SERPL-CCNC: 22 IU/L (ref 0–40)
BILIRUB SERPL-MCNC: 1 MG/DL (ref 0–1.2)
BUN SERPL-MCNC: 19 MG/DL (ref 8–27)
BUN/CREAT SERPL: 16 (ref 10–24)
CALCIUM SERPL-MCNC: 9.5 MG/DL (ref 8.6–10.2)
CHLORIDE SERPL-SCNC: 102 MMOL/L (ref 96–106)
CHOLEST SERPL-MCNC: 181 MG/DL (ref 100–199)
CO2 SERPL-SCNC: 25 MMOL/L (ref 20–29)
CREAT SERPL-MCNC: 1.21 MG/DL (ref 0.76–1.27)
CREAT UR-MCNC: 404.9 MG/DL
EGFR: 68 ML/MIN/1.73
GLOBULIN SER-MCNC: 2.1 G/DL (ref 1.5–4.5)
GLUCOSE SERPL-MCNC: 105 MG/DL (ref 70–99)
HDLC SERPL-MCNC: 46 MG/DL
LDLC SERPL CALC-MCNC: 114 MG/DL (ref 0–99)
MICROALBUMIN UR-MCNC: 18.2 UG/ML
POTASSIUM SERPL-SCNC: 4.1 MMOL/L (ref 3.5–5.2)
PROT SERPL-MCNC: 6.4 G/DL (ref 6–8.5)
PSA SERPL-MCNC: 3.4 NG/ML (ref 0–4)
SL AMB VLDL CHOLESTEROL CALC: 21 MG/DL (ref 5–40)
SODIUM SERPL-SCNC: 142 MMOL/L (ref 134–144)
TRIGL SERPL-MCNC: 115 MG/DL (ref 0–149)

## 2024-01-04 ENCOUNTER — TELEPHONE (OUTPATIENT)
Dept: FAMILY MEDICINE CLINIC | Facility: CLINIC | Age: 64
End: 2024-01-04

## 2024-01-05 NOTE — TELEPHONE ENCOUNTER
----- Message from Clair Issa DO sent at 1/4/2024  5:28 PM EST -----  LDL trending down!  [facility] labs - will d/w pt at his OV on 1/25/2024

## 2024-01-25 ENCOUNTER — OFFICE VISIT (OUTPATIENT)
Dept: FAMILY MEDICINE CLINIC | Facility: CLINIC | Age: 64
End: 2024-01-25
Payer: COMMERCIAL

## 2024-01-25 VITALS
HEIGHT: 65 IN | WEIGHT: 182 LBS | OXYGEN SATURATION: 97 % | DIASTOLIC BLOOD PRESSURE: 80 MMHG | SYSTOLIC BLOOD PRESSURE: 136 MMHG | HEART RATE: 72 BPM | RESPIRATION RATE: 16 BRPM | BODY MASS INDEX: 30.32 KG/M2

## 2024-01-25 DIAGNOSIS — G47.33 OBSTRUCTIVE SLEEP APNEA: ICD-10-CM

## 2024-01-25 DIAGNOSIS — K21.9 GASTROESOPHAGEAL REFLUX DISEASE WITHOUT ESOPHAGITIS: ICD-10-CM

## 2024-01-25 DIAGNOSIS — Z78.9 STATIN INTOLERANCE: ICD-10-CM

## 2024-01-25 DIAGNOSIS — E78.2 MIXED HYPERLIPIDEMIA: ICD-10-CM

## 2024-01-25 DIAGNOSIS — Z82.49 FAMILY HISTORY OF HYPERTENSION: ICD-10-CM

## 2024-01-25 DIAGNOSIS — I10 BENIGN ESSENTIAL HYPERTENSION: Primary | ICD-10-CM

## 2024-01-25 PROCEDURE — 99214 OFFICE O/P EST MOD 30 MIN: CPT | Performed by: FAMILY MEDICINE

## 2024-01-25 NOTE — PROGRESS NOTES
Assessment/Plan:   Diagnoses and all orders for this visit:    Benign essential hypertension  -     Comprehensive metabolic panel; Future  -     Albumin / creatinine urine ratio; Future  - BP stable   - currently on Lopressor 50mg BID - cont current regimen   - using CPAP - follows with Sleep Med and annual visit advised   - nml renal function and urine microalbumin on labs done 12/27/2023  - UTD with COVID IMMs and Boosters x4   - UTD with PCV20   - UTD with annual Flu vaccine   - (+) FHx of HTN in both parents   - RTO in 6months, with labs, for f/u and annual exam = pt aware and agreeable   Family history of hypertension  Obstructive sleep apnea  -     CBC and differential; Future    Mixed hyperlipidemia  -     Lipid panel; Future  - ,  (12/27/2023)   - unable to tolerate statins or tricor 2/2 confusion   - currently using Fish Oil and Red Yeast Rice   - diet/exercise  - The 10-year ASCVD risk score (Zan CAVAZOS, et al., 2019) is: 12.7%    Values used to calculate the score:      Age: 63 years      Sex: Male      Is Non- : No      Diabetic: No      Tobacco smoker: No      Systolic Blood Pressure: 136 mmHg      Is BP treated: Yes      HDL Cholesterol: 46 mg/dL      Total Cholesterol: 181 mg/dL  Statin intolerance    BMI 30.0-30.9,adult  -     TSH, 3rd generation with Free T4 reflex; Future  - BMI 30.3  - discussed diet and encouraged exercise  - educated that it takes 3500 abelardo to lose 1lb  - advised to cut down on carbs, 5 servings of fruits/veggies/day, increase water intake (65-80oz/water/day)   - appropriate weight loss goal for men = 1-2lb/week  - per the [employer] Association - 150mins/exercise/week, but to lose and maintain weight 200-300mins/exercise/week   - pt [facility] to [facility]     Gastroesophageal reflux disease without esophagitis  -     Ambulatory Referral to Gastroenterology; Future  - last EGD 12/2022 with 2yr recall - referred to  "GI for EGD           Subjective:    Patient ID: Tai Brooke is a 63 y.o. male.  HPI  - reviewed labs done 12/27/2023   - BP in the office 136/80 and 138/80 on my repeat   - currently on Lopressor 50mg BID only and tolerating it well - does not currently need RFs  - has been using CPAP - sleeping better, not as tried during the day - follows with Sleep Med and annual visit advised   - (+) FHx of HTN in both parents   - thinking about retiring   - good ROM of L-ankle   - today in the office pt denies F/C/N/V/HA/visual changes/CP/palpitations/SOB/wheezing/abd pain/D/C/urinary incontinence/LE edema        The following portions of the patient's history were reviewed and updated as appropriate: allergies, current medications, past family history, past medical history, past social history, past surgical history and problem list.    Review of Systems  as per HPI    Objective:  /80 (BP Location: Left arm, Patient Position: Sitting, Cuff Size: Standard)   Pulse 72   Resp 16   Ht 5' 5\" (1.651 m)   Wt 82.6 kg (182 lb)   SpO2 97%   BMI 30.29 kg/m²    Physical Exam  Vitals reviewed.   Constitutional:       General: He is not in acute distress.     Appearance: Normal appearance. He is not ill-appearing, toxic-appearing or diaphoretic.   HENT:      Head: Normocephalic and atraumatic.      Right Ear: External ear normal.      Left Ear: External ear normal.   Eyes:      General: No scleral icterus.        Right eye: No discharge.         Left eye: No discharge.      Extraocular Movements: Extraocular movements intact.      Conjunctiva/sclera: Conjunctivae normal.   Cardiovascular:      Rate and Rhythm: Normal rate and regular rhythm.      Heart sounds: Normal heart sounds.   Pulmonary:      Effort: Pulmonary effort is normal. No respiratory distress.      Breath sounds: Normal breath sounds. No stridor. No wheezing, rhonchi or rales.   Abdominal:      Palpations: Abdomen is soft.   Musculoskeletal:         " General: No swelling, tenderness or deformity. Normal range of motion.      Cervical back: Normal range of motion.      Right lower leg: No edema.      Left lower leg: No edema.   Skin:     General: Skin is warm.   Neurological:      General: No focal deficit present.      Mental Status: He is alert and oriented to person, place, and time.   Psychiatric:         Mood and Affect: Mood normal.         Behavior: Behavior normal.         BMI Counseling: Body mass index is 30.29 kg/m². The BMI is above normal. Nutrition recommendations include 3-5 servings of fruits/vegetables daily. Exercise recommendations include exercising 3-5 times per week.    Depression Screening Follow-up Plan: Patient's depression screening was positive with a PHQ-2 score of 0. Their PHQ-9 score was . Clinically patient does not have depression. No treatment is required.

## 2024-02-26 DIAGNOSIS — I10 BENIGN ESSENTIAL HYPERTENSION: ICD-10-CM

## 2024-02-27 RX ORDER — METOPROLOL TARTRATE 50 MG/1
50 TABLET, FILM COATED ORAL 2 TIMES DAILY
Qty: 180 TABLET | Refills: 1 | Status: SHIPPED | OUTPATIENT
Start: 2024-02-27

## 2024-03-04 DIAGNOSIS — K21.9 GASTROESOPHAGEAL REFLUX DISEASE WITHOUT ESOPHAGITIS: ICD-10-CM

## 2024-03-04 RX ORDER — PANTOPRAZOLE SODIUM 20 MG/1
20 TABLET, DELAYED RELEASE ORAL DAILY
Qty: 90 TABLET | Refills: 1 | Status: SHIPPED | OUTPATIENT
Start: 2024-03-04

## 2024-04-08 NOTE — DISCHARGE INSTRUCTIONS
Upper Endoscopy and Colonoscopy   WHAT YOU NEED TO KNOW:   An upper endoscopy is also called an upper gastrointestinal (GI) endoscopy, or an esophagogastroduodenoscopy (EGD)  It is a procedure to examine the inside of your esophagus, stomach, and duodenum (first part of the small intestine) with a scope  You may feel bloated, gassy, or have some abdominal discomfort after your procedure  Your throat may be sore for 24 to 36 hours  You may burp or pass gas from air that is still inside your body  A colonoscopy is a procedure to examine the inside of your colon (intestine) with a scope  Polyps or tissue growths may have been removed during your colonoscopy  It is normal to feel bloated and to have some abdominal discomfort  You should be passing gas  If you have hemorrhoids or you had polyps removed, you may have a small amount of bleeding  DISCHARGE INSTRUCTIONS:   Seek care immediately if:   You have sudden, severe abdominal pain  You have problems swallowing  You have a large amount of black, sticky bowel movements or blood in your bowel movements  You have sudden trouble breathing  You feel weak, lightheaded, or faint or your heart beats faster than normal for you  Contact your healthcare provider if:   You have a fever and chills  You have nausea or are vomiting  Your abdomen is bloated or feels full and hard  You have abdominal pain  You have a large amount of black, sticky bowel movements or blood in your bowel movements  You have not had a bowel movement for 3 days after your procedure  You have rash or hives  You have questions or concerns about your procedure  Activity:   ·       Do not lift, strain, or run for 24 hours after your procedure  ·       Rest after your procedure  You have been given medicine to relax you  Do not drive or make important decisions until the day after your procedure   Return to your normal activity as directed  ·       Relieve gas and discomfort from bloating by lying on your right side with a heating pad on your abdomen  You may need to take short walks to help the gas move out  Eat small meals until bloating is relieved  Follow up with your healthcare provider as directed: Write down your questions so you remember to ask them during your visits  If you take a “blood thinner”, please review the specific instructions from your endoscopist about when you should resume it  These can be found in the “Recommendation” and “Your Medication list” sections of this After Visit Summary  weight-bearing as tolerated

## 2024-06-02 DIAGNOSIS — I10 BENIGN ESSENTIAL HYPERTENSION: ICD-10-CM

## 2024-06-02 RX ORDER — METOPROLOL TARTRATE 50 MG/1
50 TABLET, FILM COATED ORAL 2 TIMES DAILY
Qty: 60 TABLET | Refills: 5 | Status: SHIPPED | OUTPATIENT
Start: 2024-06-02

## 2024-07-13 LAB
ALBUMIN SERPL-MCNC: 4.6 G/DL (ref 3.9–4.9)
ALBUMIN/CREAT UR: 4 MG/G CREAT (ref 0–29)
ALP SERPL-CCNC: 62 IU/L (ref 44–121)
ALT SERPL-CCNC: 23 IU/L (ref 0–44)
AST SERPL-CCNC: 22 IU/L (ref 0–40)
BASOPHILS # BLD AUTO: 0.1 X10E3/UL (ref 0–0.2)
BASOPHILS NFR BLD AUTO: 2 %
BILIRUB SERPL-MCNC: 0.7 MG/DL (ref 0–1.2)
BUN SERPL-MCNC: 18 MG/DL (ref 8–27)
BUN/CREAT SERPL: 16 (ref 10–24)
CALCIUM SERPL-MCNC: 9.7 MG/DL (ref 8.6–10.2)
CHLORIDE SERPL-SCNC: 102 MMOL/L (ref 96–106)
CHOLEST SERPL-MCNC: 214 MG/DL (ref 100–199)
CO2 SERPL-SCNC: 25 MMOL/L (ref 20–29)
CREAT SERPL-MCNC: 1.13 MG/DL (ref 0.76–1.27)
CREAT UR-MCNC: 242.4 MG/DL
EGFR: 73 ML/MIN/1.73
EOSINOPHIL # BLD AUTO: 0.1 X10E3/UL (ref 0–0.4)
EOSINOPHIL NFR BLD AUTO: 3 %
ERYTHROCYTE [DISTWIDTH] IN BLOOD BY AUTOMATED COUNT: 13.1 % (ref 11.6–15.4)
GLOBULIN SER-MCNC: 2.2 G/DL (ref 1.5–4.5)
GLUCOSE SERPL-MCNC: 104 MG/DL (ref 70–99)
HCT VFR BLD AUTO: 52.2 % (ref 37.5–51)
HDLC SERPL-MCNC: 44 MG/DL
HGB BLD-MCNC: 16.7 G/DL (ref 13–17.7)
IMM GRANULOCYTES # BLD: 0 X10E3/UL (ref 0–0.1)
IMM GRANULOCYTES NFR BLD: 0 %
LDLC SERPL CALC-MCNC: 142 MG/DL (ref 0–99)
LYMPHOCYTES # BLD AUTO: 1.8 X10E3/UL (ref 0.7–3.1)
LYMPHOCYTES NFR BLD AUTO: 41 %
MCH RBC QN AUTO: 28.5 PG (ref 26.6–33)
MCHC RBC AUTO-ENTMCNC: 32 G/DL (ref 31.5–35.7)
MCV RBC AUTO: 89 FL (ref 79–97)
MICROALBUMIN UR-MCNC: 10.2 UG/ML
MONOCYTES # BLD AUTO: 0.5 X10E3/UL (ref 0.1–0.9)
MONOCYTES NFR BLD AUTO: 10 %
NEUTROPHILS # BLD AUTO: 1.9 X10E3/UL (ref 1.4–7)
NEUTROPHILS NFR BLD AUTO: 44 %
PLATELET # BLD AUTO: 199 X10E3/UL (ref 150–450)
POTASSIUM SERPL-SCNC: 4.4 MMOL/L (ref 3.5–5.2)
PROT SERPL-MCNC: 6.8 G/DL (ref 6–8.5)
RBC # BLD AUTO: 5.85 X10E6/UL (ref 4.14–5.8)
SL AMB VLDL CHOLESTEROL CALC: 28 MG/DL (ref 5–40)
SODIUM SERPL-SCNC: 142 MMOL/L (ref 134–144)
TRIGL SERPL-MCNC: 157 MG/DL (ref 0–149)
TSH SERPL DL<=0.005 MIU/L-ACNC: 1.26 UIU/ML (ref 0.45–4.5)
WBC # BLD AUTO: 4.3 X10E3/UL (ref 3.4–10.8)

## 2024-07-25 ENCOUNTER — OFFICE VISIT (OUTPATIENT)
Dept: FAMILY MEDICINE CLINIC | Facility: CLINIC | Age: 64
End: 2024-07-25
Payer: COMMERCIAL

## 2024-07-25 VITALS
RESPIRATION RATE: 16 BRPM | BODY MASS INDEX: 29.66 KG/M2 | SYSTOLIC BLOOD PRESSURE: 150 MMHG | DIASTOLIC BLOOD PRESSURE: 80 MMHG | WEIGHT: 178 LBS | HEART RATE: 63 BPM | HEIGHT: 65 IN | OXYGEN SATURATION: 96 %

## 2024-07-25 DIAGNOSIS — Z12.5 SCREENING FOR PROSTATE CANCER: ICD-10-CM

## 2024-07-25 DIAGNOSIS — Z78.9 STATIN INTOLERANCE: ICD-10-CM

## 2024-07-25 DIAGNOSIS — K21.9 GASTROESOPHAGEAL REFLUX DISEASE WITHOUT ESOPHAGITIS: ICD-10-CM

## 2024-07-25 DIAGNOSIS — R97.20 ELEVATED PSA: ICD-10-CM

## 2024-07-25 DIAGNOSIS — I10 BENIGN ESSENTIAL HYPERTENSION: ICD-10-CM

## 2024-07-25 DIAGNOSIS — Z00.00 ANNUAL PHYSICAL EXAM: Primary | ICD-10-CM

## 2024-07-25 DIAGNOSIS — Z82.49 FAMILY HISTORY OF HYPERTENSION: ICD-10-CM

## 2024-07-25 DIAGNOSIS — G47.33 OBSTRUCTIVE SLEEP APNEA: ICD-10-CM

## 2024-07-25 DIAGNOSIS — E78.2 MIXED HYPERLIPIDEMIA: ICD-10-CM

## 2024-07-25 PROCEDURE — 99396 PREV VISIT EST AGE 40-64: CPT | Performed by: FAMILY MEDICINE

## 2024-07-25 PROCEDURE — 99214 OFFICE O/P EST MOD 30 MIN: CPT | Performed by: FAMILY MEDICINE

## 2024-07-25 RX ORDER — HYDROCHLOROTHIAZIDE 12.5 MG/1
12.5 TABLET ORAL DAILY
Qty: 30 TABLET | Refills: 1 | Status: SHIPPED | OUTPATIENT
Start: 2024-07-25

## 2024-07-25 NOTE — PROGRESS NOTES
Assessment/Plan:   Diagnoses and all orders for this visit:    Annual physical exam  - reviewed PMHx, PSHx, meds, allergies, FHx, Soc Hx and Sexual Hx   - discussed diet and encouraged exercise - see below   - reviewed labs done 7/12/2024 - see below   - UTD with Tdap   - UTD with COVID IMMs and Booster x4  - UTD with PCV20   - declined STD screening in the office today   - UTD with EGD/Cscope 12/2022 - repeat EGD in 2yrs (2024) and Cscope in 7yrs (2029) - pt has GI referral and aware to call to schedule c/s and procedure  - UTD with annual screening PSA - per Uro has been stable and can follow annually with PCP - script given   - UTD with Optho and Dental   - RTO in 1yr for annual exam - pt aware and agreeable     Benign essential hypertension  -     hydroCHLOROthiazide 12.5 mg tablet; Take 1 tablet (12.5 mg total) by mouth daily  -     Basic metabolic panel; Future  - BP in the office 148/70 and same on my repeat 150/80   - stable renal function and urine microalbumin on labs done 7/12/2024   - currently on Lopressor 50mg BID and tolerating it well - does not currently need RFs  - has been using CPAP - sleeping better, not as tried during the day - follows with Sleep Med and annual visit advised   - (+) FHx of HTN in both parents   - UTD with PCV20   - UTD with COVID IMMs and Boosters x4  - (+) humming in back of head and intermittent blurry vision   - will add HCTZ 12.5mg QD to antihypertensive regimen  - RTO in 3wks, with repeat BMP, for BP check - pt aware and agreeable   Family history of hypertension  Obstructive sleep apnea    Mixed hyperlipidemia  -     Lipid panel; Future  - Lipids (7/12/2024): , , HDL 44,   - unable to tolerate statins or tricor 2/2 confusion   - currently using Fish Oil and Red Yeast Rice   - diet/exercise   - repeat labs in 6months and if no change t/c referral to Cardio   - The 10-year ASCVD risk score (Zan CAVAZOS, et al., 2019) is: 18.8%    Values used to calculate  the score:      Age: 64 years      Sex: Male      Is Non- : No      Diabetic: No      Tobacco smoker: No      Systolic Blood Pressure: 150 mmHg      Is BP treated: Yes      HDL Cholesterol: 44 mg/dL      Total Cholesterol: 214 mg/dL  Statin intolerance    Elevated PSA  Screening for prostate cancer  -     PSA, Total Screen; Future  - UTD with annual screening PSA - per Uro has been stable and can follow annually with PCP - script given     Gastroesophageal reflux disease without esophagitis  - last EGD 12/2022 with 2yr recall - pt has GI referral and aware to call to schedule c/s and procedure          Subjective:    Patient ID: Tai Brooke is a 64 y.o. male.  Tai Brooke is a 64 y.o. male who presents to the office for an annual exam and f/u   1) Annual   - PMHx: HTN, HL, diverticulosis (diverticulitis in 2019), seasonal allergies, BMI 29.6 <-- 30.5 <-- 33.3, elevated PSA (per Uro in 12/2023 - stable PSA and follow annually with PCP), severe CECILIA - CPAP  - Specialists: Uro, GI, Sleep Med (follows annually - next OV scheduled 11/11/2024)   - allergies: PCN - swelling, Statins/Tricor - confusion   - Meds: see med rec   - PSHx: hernia repair   - FHx: M (DM, HTN, CAD), F (HTN, Alzheimer's, Colon Ca - 71yo), B (HL), denies FHx of prostate/testicular ca   - Immunizations: UTD with Tdap, UTD with COVID IMMs and Boosters x4, UTD with PCV20   - Hm: EGD/Cscope 12/2022 - repeat EGD in 2yrs (2024) and Cscope in 7yrs (2029)   - diet/exercise: sporadically exercising, diet: not a lot of red meat, some egg intake weekly   - social: denies tob/illicits, social EtOH (1x/month), works PT as a  for [employer]   - sexual Hx: active with spouse, declined STD screening   - last vision: wears glasses, last Optho (Dr Brown) annually   - last dental: [facility] - goes Q6months   - reviewed labs done 7/12/2024  2) HTN  - BP in the office 148/70 and same on my repeat 150/80   -  "currently on Lopressor 50mg BID only and tolerating it well - does not currently need RFs  - has been using CPAP - sleeping better, not as tried during the day - follows with Sleep Med and annual visit advised   - (+) FHx of HTN in both parents   - (+) humming in back of head and intermittent blurry vision   - today in the office pt denies F/C/N/V/HA/CP/palpitations/SOB/wheezing/abd pain/D/C/urinary incontinence/LE edema  3) HL   - unable to tolerate statins or tricor 2/2 confusion   - currently using Fish Oil and Red Yeast Rice   - Lipids (7/12/2024): , , HDL 44,   - has not been exercising regularly and with sedentary job   - diet as noted above         The following portions of the patient's history were reviewed and updated as appropriate: allergies, current medications, past family history, past medical history, past social history, past surgical history and problem list.    Review of Systems  as per HPI    Objective:  /80 (BP Location: Left arm, Patient Position: Sitting, Cuff Size: Standard)   Pulse 63   Resp 16   Ht 5' 5\" (1.651 m)   Wt 80.7 kg (178 lb)   SpO2 96%   BMI 29.62 kg/m²    Physical Exam  Vitals reviewed.   Constitutional:       General: He is not in acute distress.     Appearance: Normal appearance. He is not ill-appearing, toxic-appearing or diaphoretic.   HENT:      Head: Normocephalic and atraumatic.      Right Ear: External ear normal.      Left Ear: External ear normal.      Nose: Nose normal.   Eyes:      General: No scleral icterus.        Right eye: No discharge.         Left eye: No discharge.      Extraocular Movements: Extraocular movements intact.      Conjunctiva/sclera: Conjunctivae normal.   Cardiovascular:      Rate and Rhythm: Normal rate and regular rhythm.      Heart sounds: Normal heart sounds.   Pulmonary:      Effort: Pulmonary effort is normal.      Breath sounds: Normal breath sounds.   Abdominal:      Palpations: Abdomen is soft. "   Musculoskeletal:         General: Normal range of motion.      Cervical back: Normal range of motion.      Right lower leg: No edema.      Left lower leg: No edema.   Skin:     General: Skin is warm.   Neurological:      General: No focal deficit present.      Mental Status: He is alert and oriented to person, place, and time.   Psychiatric:         Mood and Affect: Mood normal.         Behavior: Behavior normal.

## 2024-07-25 NOTE — PATIENT INSTRUCTIONS
"Patient Education     Routine physical for adults   The Basics   Written by the doctors and editors at [facility]   What is a physical? -- A physical is a routine visit, or \"check-up,\" with your doctor. You might also hear it called a \"wellness visit\" or \"preventive visit.\"  During each visit, the doctor will:   Ask about your physical and mental health   Ask about your habits, behaviors, and lifestyle   Do an exam   Give you vaccines if needed   Talk to you about any medicines you take   Give advice about your health   Answer your questions  Getting regular check-ups is an important part of taking care of your health. It can help your doctor find and treat any problems you have. But it's also important for preventing health problems.  A routine physical is different from a \"sick visit.\" A sick visit is when you see a doctor because of a health concern or problem. Since physicals are scheduled ahead of time, you can think about what you want to ask the doctor.  How often should I get a physical? -- It depends on your age and health. In general, for people age 21 years and older:   If you are younger than 50 years, you might be able to get a physical every 3 years.   If you are 50 years or older, your doctor might recommend a physical every year.  If you have an ongoing health condition, like diabetes or high blood pressure, your doctor will probably want to see you more often.  What happens during a physical? -- In general, each visit will include:   Physical exam - The doctor or nurse will check your height, weight, heart rate, and blood pressure. They will also look at your eyes and ears. They will ask about how you are feeling and whether you have any symptoms that bother you.   Medicines - It's a good idea to bring a list of all the medicines you take to each doctor visit. Your doctor will talk to you about your medicines and answer any questions. Tell them if you are having any side effects that bother you. You " "should also tell them if you are having trouble paying for any of your medicines.   Habits and behaviors - This includes:   Your diet   Your exercise habits   Whether you smoke, drink alcohol, or use drugs   Whether you are sexually active   Whether you feel safe at home  Your doctor will talk to you about things you can do to improve your health and lower your risk of health problems. They will also offer help and support. For example, if you want to quit smoking, they can give you advice and might prescribe medicines. If you want to improve your diet or get more physical activity, they can help you with this, too.   Lab tests, if needed - The tests you get will depend on your age and situation. For example, your doctor might want to check your:   Cholesterol   Blood sugar   Iron level   Vaccines - The recommended vaccines will depend on your age, health, and what vaccines you already had. Vaccines are very important because they can prevent certain serious or deadly infections.   Discussion of screening - \"Screening\" means checking for diseases or other health problems before they cause symptoms. Your doctor can recommend screening based on your age, risk, and preferences. This might include tests to check for:   Cancer, such as breast, prostate, cervical, ovarian, colorectal, prostate, lung, or skin cancer   Sexually transmitted infections, such as chlamydia and gonorrhea   Mental health conditions like depression and anxiety  Your doctor will talk to you about the different types of screening tests. They can help you decide which screenings to have. They can also explain what the results might mean.   Answering questions - The physical is a good time to ask the doctor or nurse questions about your health. If needed, they can refer you to other doctors or specialists, too.  Adults older than 65 years often need other care, too. As you get older, your doctor will talk to you about:   How to prevent falling at " home   Hearing or vision tests   Memory testing   How to take your medicines safely   Making sure that you have the help and support you need at home  All topics are updated as new evidence becomes available and our peer review process is complete.  This topic retrieved from [employer] on: May 02, 2024.  Topic 888105 Version 1.0  Release: 32.4.3 - C32.122  © 2024 UpToDate, Inc. and/or its affiliates. All rights reserved.  Consumer Information Use and Disclaimer   Disclaimer: This generalized information is a limited summary of diagnosis, treatment, and/or medication information. It is not meant to be comprehensive and should be used as a tool to help the user understand and/or assess potential diagnostic and treatment options. It does NOT include all information about conditions, treatments, medications, side effects, or risks that may apply to a specific patient. It is not intended to be medical advice or a substitute for the medical advice, diagnosis, or treatment of a health care provider based on the health care provider's examination and assessment of a patient's specific and unique circumstances. Patients must speak with a health care provider for complete information about their health, medical questions, and treatment options, including any risks or benefits regarding use of medications. This information does not endorse any treatments or medications as safe, effective, or approved for treating a specific patient. UpToDate, Inc. and its affiliates disclaim any warranty or liability relating to this information or the use thereof.The use of this information is governed by the Terms of Use, available at https://www.woltersGeneluxuwer.com/en/know/clinical-effectiveness-terms. 2024© UpToDate, Inc. and its affiliates and/or licensors. All rights reserved.  Copyright   © 2024 UpToDate, Inc. and/or its affiliates. All rights reserved.

## 2024-08-09 LAB
BUN SERPL-MCNC: 20 MG/DL (ref 8–27)
BUN/CREAT SERPL: 17 (ref 10–24)
CALCIUM SERPL-MCNC: 9.7 MG/DL (ref 8.6–10.2)
CHLORIDE SERPL-SCNC: 100 MMOL/L (ref 96–106)
CO2 SERPL-SCNC: 29 MMOL/L (ref 20–29)
CREAT SERPL-MCNC: 1.19 MG/DL (ref 0.76–1.27)
EGFR: 69 ML/MIN/1.73
GLUCOSE SERPL-MCNC: 112 MG/DL (ref 70–99)
POTASSIUM SERPL-SCNC: 4.1 MMOL/L (ref 3.5–5.2)
SODIUM SERPL-SCNC: 142 MMOL/L (ref 134–144)

## 2024-08-15 ENCOUNTER — OFFICE VISIT (OUTPATIENT)
Dept: FAMILY MEDICINE CLINIC | Facility: CLINIC | Age: 64
End: 2024-08-15
Payer: COMMERCIAL

## 2024-08-15 VITALS
DIASTOLIC BLOOD PRESSURE: 78 MMHG | WEIGHT: 177 LBS | SYSTOLIC BLOOD PRESSURE: 130 MMHG | HEIGHT: 65 IN | OXYGEN SATURATION: 99 % | BODY MASS INDEX: 29.49 KG/M2 | HEART RATE: 50 BPM

## 2024-08-15 DIAGNOSIS — I10 BENIGN ESSENTIAL HYPERTENSION: ICD-10-CM

## 2024-08-15 DIAGNOSIS — R00.1 BRADYCARDIA: Primary | ICD-10-CM

## 2024-08-15 PROCEDURE — 99214 OFFICE O/P EST MOD 30 MIN: CPT | Performed by: FAMILY MEDICINE

## 2024-08-15 RX ORDER — HYDROCHLOROTHIAZIDE 12.5 MG/1
25 TABLET ORAL DAILY
Start: 2024-08-15 | End: 2024-08-20 | Stop reason: SDUPTHER

## 2024-08-15 RX ORDER — AMLODIPINE BESYLATE 5 MG/1
5 TABLET ORAL DAILY
Qty: 30 TABLET | Refills: 0 | Status: SHIPPED | OUTPATIENT
Start: 2024-08-15 | End: 2024-08-16

## 2024-08-15 NOTE — PROGRESS NOTES
"Assessment/Plan:   Diagnoses and all orders for this visit:    Bradycardia  -     Ambulatory Referral to Cardiology; Future  Benign essential hypertension  -     Ambulatory Referral to Cardiology; Future  -     hydroCHLOROthiazide 12.5 mg tablet; Take 2 tablets (25 mg total) by mouth daily  -     amLODIPine (NORVASC) 5 mg tablet; Take 1 tablet (5 mg total) by mouth daily  - HR in the 50s   - BP in the office 130/78   - for now, advised to STOP BB  - increase HCTZ to 25mg QD (up from 12.5mg QD) and add CCB 5mg QD  - RTO tmrw for BP and HR check - pt aware and agreeable   - ER precautions d/w pt and pt also referred to Cardio           Subjective:    Patient ID: Tai Brooke is a 64 y.o. male.  HPI  65yo M presents to the office for BP check   - BP in the office 130/78 but noted to have HR in the 50s  - denies HA/lightheadedness/dizziness/chest pain/chest pressure/WATSON/LE edema   - of note, has been on BB since 2000      The following portions of the patient's history were reviewed and updated as appropriate: allergies, current medications, past family history, past medical history, past social history, past surgical history and problem list.    Review of Systems  as per HPI    Objective:  /78   Pulse (!) 50   Ht 5' 5\" (1.651 m)   Wt 80.3 kg (177 lb)   SpO2 99%   BMI 29.45 kg/m²    Physical Exam  Vitals reviewed.   Constitutional:       General: He is not in acute distress.     Appearance: Normal appearance. He is not ill-appearing, toxic-appearing or diaphoretic.   HENT:      Head: Normocephalic and atraumatic.      Right Ear: External ear normal.      Left Ear: External ear normal.      Nose: Nose normal.   Eyes:      General: No scleral icterus.        Right eye: No discharge.         Left eye: No discharge.      Extraocular Movements: Extraocular movements intact.      Conjunctiva/sclera: Conjunctivae normal.   Cardiovascular:      Rate and Rhythm: Regular rhythm. Bradycardia present.   Pulmonary: "      Effort: Pulmonary effort is normal. No respiratory distress.      Breath sounds: Normal breath sounds. No stridor. No wheezing, rhonchi or rales.   Abdominal:      Palpations: Abdomen is soft.   Musculoskeletal:         General: Normal range of motion.   Skin:     General: Skin is warm.   Neurological:      General: No focal deficit present.      Mental Status: He is alert and oriented to person, place, and time.   Psychiatric:         Mood and Affect: Mood normal.         Behavior: Behavior normal.

## 2024-08-16 ENCOUNTER — OFFICE VISIT (OUTPATIENT)
Dept: FAMILY MEDICINE CLINIC | Facility: CLINIC | Age: 64
End: 2024-08-16
Payer: COMMERCIAL

## 2024-08-16 VITALS
BODY MASS INDEX: 29.49 KG/M2 | DIASTOLIC BLOOD PRESSURE: 76 MMHG | HEIGHT: 65 IN | HEART RATE: 72 BPM | RESPIRATION RATE: 16 BRPM | WEIGHT: 177 LBS | SYSTOLIC BLOOD PRESSURE: 146 MMHG | OXYGEN SATURATION: 98 %

## 2024-08-16 DIAGNOSIS — Z82.49 FAMILY HISTORY OF HYPERTENSION: ICD-10-CM

## 2024-08-16 DIAGNOSIS — I10 BENIGN ESSENTIAL HYPERTENSION: Primary | ICD-10-CM

## 2024-08-16 DIAGNOSIS — R00.1 BRADYCARDIA: ICD-10-CM

## 2024-08-16 PROCEDURE — 99214 OFFICE O/P EST MOD 30 MIN: CPT | Performed by: FAMILY MEDICINE

## 2024-08-16 RX ORDER — LOSARTAN POTASSIUM 50 MG/1
50 TABLET ORAL DAILY
Qty: 30 TABLET | Refills: 0 | Status: SHIPPED | OUTPATIENT
Start: 2024-08-16

## 2024-08-16 NOTE — PROGRESS NOTES
"Assessment/Plan:   Diagnoses and all orders for this visit:    Benign essential hypertension  -     losartan (COZAAR) 50 mg tablet; Take 1 tablet (50 mg total) by mouth daily  - HR in the office 72  - BP in the office 146/76 on my repeat   - advised to cont HCTZ 25mg QD and will start on ARB 50mg QD  - STOP CCB 5mg QD (which was started yesterday)   - BB was STOPPED on 8/15/2024 given bradycardia   - advised that if he feels like his pressures are too low to reduce HCTZ back down to 12.5mg QD - pt aware and agreeable   - RTO in 2wks for BP check - pt aware and agreeable   Bradycardia  - resolved not that off BB   Family history of hypertension          Subjective:    Patient ID: Tai Brooke is a 64 y.o. male.  HPI  65yo M presents to the office for f/u   - HR in the office 72  - BP in the office 146/76 on my repeat   - denies F/C/N/V/CP/palpitations/SOB/wheezing/abd pain/D/LE edema       The following portions of the patient's history were reviewed and updated as appropriate: allergies, current medications, past family history, past medical history, past social history, past surgical history and problem list.    Review of Systems  as per HPI    Objective:  /76 (BP Location: Left arm, Patient Position: Sitting, Cuff Size: Standard)   Pulse 72   Resp 16   Ht 5' 5\" (1.651 m)   Wt 80.3 kg (177 lb)   SpO2 98%   BMI 29.45 kg/m²    Physical Exam  Vitals reviewed.   Constitutional:       General: He is not in acute distress.     Appearance: Normal appearance. He is not ill-appearing, toxic-appearing or diaphoretic.   HENT:      Head: Normocephalic and atraumatic.      Right Ear: External ear normal.      Left Ear: External ear normal.      Nose: Nose normal.   Eyes:      General: No scleral icterus.        Right eye: No discharge.         Left eye: No discharge.      Extraocular Movements: Extraocular movements intact.      Conjunctiva/sclera: Conjunctivae normal.   Cardiovascular:      Rate and Rhythm: " Normal rate and regular rhythm.      Heart sounds: Normal heart sounds.   Pulmonary:      Effort: Pulmonary effort is normal.      Breath sounds: Normal breath sounds.   Abdominal:      Palpations: Abdomen is soft.   Musculoskeletal:         General: Normal range of motion.      Cervical back: Normal range of motion.      Right lower leg: No edema.      Left lower leg: No edema.   Neurological:      General: No focal deficit present.      Mental Status: He is alert and oriented to person, place, and time.   Psychiatric:         Mood and Affect: Mood normal.         Behavior: Behavior normal.

## 2024-08-20 DIAGNOSIS — I10 BENIGN ESSENTIAL HYPERTENSION: ICD-10-CM

## 2024-08-20 RX ORDER — HYDROCHLOROTHIAZIDE 12.5 MG/1
25 TABLET ORAL DAILY
Qty: 200 TABLET | Refills: 1 | Status: SHIPPED | OUTPATIENT
Start: 2024-08-20 | End: 2024-08-27 | Stop reason: SDUPTHER

## 2024-08-20 NOTE — TELEPHONE ENCOUNTER
Reason for call:   [x] Refill   [] Prior Auth  [] Other:     Office:   [x] PCP/Provider -   [] Specialty/Provider -     Medication: hydroCHLOROthiazide 12.5 mg 2 tablets daily       Pharmacy: [facility]     Does the patient have enough for 3 days?   [] Yes   [x] No - Send as HP to POD

## 2024-08-27 DIAGNOSIS — I10 BENIGN ESSENTIAL HYPERTENSION: ICD-10-CM

## 2024-08-27 RX ORDER — HYDROCHLOROTHIAZIDE 12.5 MG/1
25 TABLET ORAL DAILY
Qty: 180 TABLET | Refills: 0 | Status: SHIPPED | OUTPATIENT
Start: 2024-08-27

## 2024-09-10 DIAGNOSIS — I10 BENIGN ESSENTIAL HYPERTENSION: ICD-10-CM

## 2024-09-10 RX ORDER — LOSARTAN POTASSIUM 50 MG/1
50 TABLET ORAL DAILY
Qty: 30 TABLET | Refills: 5 | Status: SHIPPED | OUTPATIENT
Start: 2024-09-10

## 2024-10-01 ENCOUNTER — OFFICE VISIT (OUTPATIENT)
Dept: FAMILY MEDICINE CLINIC | Facility: CLINIC | Age: 64
End: 2024-10-01
Payer: COMMERCIAL

## 2024-10-01 VITALS
WEIGHT: 177 LBS | HEIGHT: 65 IN | OXYGEN SATURATION: 98 % | SYSTOLIC BLOOD PRESSURE: 130 MMHG | HEART RATE: 74 BPM | BODY MASS INDEX: 29.49 KG/M2 | DIASTOLIC BLOOD PRESSURE: 70 MMHG | RESPIRATION RATE: 16 BRPM

## 2024-10-01 DIAGNOSIS — I10 BENIGN ESSENTIAL HYPERTENSION: Primary | ICD-10-CM

## 2024-10-01 DIAGNOSIS — Z82.49 FAMILY HISTORY OF HYPERTENSION: ICD-10-CM

## 2024-10-01 DIAGNOSIS — R00.1 BRADYCARDIA: ICD-10-CM

## 2024-10-01 PROCEDURE — 99214 OFFICE O/P EST MOD 30 MIN: CPT | Performed by: FAMILY MEDICINE

## 2024-10-01 NOTE — PROGRESS NOTES
"Assessment/Plan:   Diagnoses and all orders for this visit:    Benign essential hypertension  - HR in the office 74  - BP in the office 134/78 and 130/70   - per wrist cuff = 143/97 -- advised to buy an arm cuff or get home BP arm cuff calibrated (per pt = this cuff is ~8yrs old)   - currently on HCTZ 25mg QD and ARB 50mg QD -- (+) increased urination - urinates 6x/day and wasn't doing that on HCTZ 12.5mg QD -- for now, advised to cont ARB 50mg QD and decrease down to HCTZ 12.5mg QD - RTO in 2wks for RN BP check - pt aware and agreeable   - BB was STOPPED on 8/15/2024 given bradycardia   - pt STRONGLY advised to get annual Flu vaccine and COVID Booster  - UTD with PCV20   - also advised to get Shingles IMMs   - caution with NSAIDs  - limit Na to 2g/day     Bradycardia  - resolved not that off BB   Family history of hypertension          Subjective:    Patient ID: Tai Brooke is a 64 y.o. male.  HPI  65yo M presents to the office for f/u   - HR in the office 74  - BP in the office 134/78 and 130/70   - per wrist cuff = 143/97   - currently on HCTZ 25mg QD and ARB 50mg QD -- (+) increased urination - urinates 6x/day and wasn't doing that on HCTZ 12.5mg QD   - BB was STOPPED on 8/15/2024 given bradycardia   - denies F/C/N/V/CP/palpitations/SOB/wheezing/abd pain/D/LE edema       The following portions of the patient's history were reviewed and updated as appropriate: allergies, current medications, past family history, past medical history, past social history, past surgical history and problem list.    Review of Systems  as per HPI    Objective:  /70 (BP Location: Right arm, Patient Position: Sitting, Cuff Size: Standard)   Pulse 74   Resp 16   Ht 5' 5\" (1.651 m)   Wt 80.3 kg (177 lb)   SpO2 98%   BMI 29.45 kg/m²    Physical Exam  Vitals reviewed.   Constitutional:       General: He is not in acute distress.     Appearance: Normal appearance. He is not ill-appearing, toxic-appearing or diaphoretic. "   HENT:      Head: Normocephalic and atraumatic.      Right Ear: External ear normal.      Left Ear: External ear normal.      Nose: Nose normal.   Eyes:      General: No scleral icterus.        Right eye: No discharge.         Left eye: No discharge.      Extraocular Movements: Extraocular movements intact.      Conjunctiva/sclera: Conjunctivae normal.   Cardiovascular:      Rate and Rhythm: Normal rate and regular rhythm.      Heart sounds: Normal heart sounds.   Pulmonary:      Effort: Pulmonary effort is normal. No respiratory distress.      Breath sounds: Normal breath sounds. No stridor. No wheezing.   Abdominal:      Palpations: Abdomen is soft.   Musculoskeletal:         General: Normal range of motion.      Cervical back: Normal range of motion.      Right lower leg: No edema.      Left lower leg: No edema.   Skin:     General: Skin is warm.   Neurological:      General: No focal deficit present.      Mental Status: He is alert and oriented to person, place, and time.   Psychiatric:         Mood and Affect: Mood normal.         Behavior: Behavior normal.

## 2024-10-12 DIAGNOSIS — I10 BENIGN ESSENTIAL HYPERTENSION: ICD-10-CM

## 2024-10-12 RX ORDER — HYDROCHLOROTHIAZIDE 12.5 MG/1
12.5 TABLET ORAL DAILY
Qty: 30 TABLET | Refills: 1 | Status: SHIPPED | OUTPATIENT
Start: 2024-10-12 | End: 2024-10-17

## 2024-10-17 ENCOUNTER — OFFICE VISIT (OUTPATIENT)
Dept: FAMILY MEDICINE CLINIC | Facility: CLINIC | Age: 64
End: 2024-10-17

## 2024-10-17 VITALS
HEIGHT: 65 IN | DIASTOLIC BLOOD PRESSURE: 82 MMHG | RESPIRATION RATE: 16 BRPM | SYSTOLIC BLOOD PRESSURE: 142 MMHG | BODY MASS INDEX: 29.49 KG/M2 | WEIGHT: 177 LBS

## 2024-10-17 DIAGNOSIS — I10 BENIGN ESSENTIAL HYPERTENSION: Primary | ICD-10-CM

## 2024-10-17 DIAGNOSIS — Z82.49 FAMILY HISTORY OF HYPERTENSION: ICD-10-CM

## 2024-10-17 RX ORDER — LOSARTAN POTASSIUM 50 MG/1
100 TABLET ORAL DAILY
Start: 2024-10-17

## 2024-10-17 NOTE — PROGRESS NOTES
"Assessment/Plan:   Diagnoses and all orders for this visit:    Benign essential hypertension  -     losartan (COZAAR) 50 mg tablet; Take 2 tablets (100 mg total) by mouth daily  - BP elevated in the office   - currently on ARB 50mg QD and HCTZ 12.5mg QD (was decreased from 25mg QD at last OV as pt with frequent urination)   - advised to STOP HCTZ and to double up on ARB -- 100mg QD   - RTO in 2wks for BP check = pt aware and agreeable   Family history of hypertension          Subjective:    Patient ID: Tai Brooke is a 64 y.o. male.  HPI  63yo M presents to the office for BP check   - currently on ARB 50mg QD and HCTZ 12.5mg QD (was decreased from 25mg QD as pt with frequent urination)   - BP in the office elevated at 142/82  - denies F/C/HA/change in vision/CP/palpitations/SOB/WATSON/LE edema  - (+) tinnitus       The following portions of the patient's history were reviewed and updated as appropriate: allergies, current medications, past family history, past medical history, past social history, past surgical history and problem list.    Review of Systems  as per HPI    Objective:  /82   Resp 16   Ht 5' 5\" (1.651 m)   Wt 80.3 kg (177 lb)   BMI 29.45 kg/m²    Physical Exam  Vitals reviewed.   Constitutional:       General: He is not in acute distress.     Appearance: Normal appearance. He is not ill-appearing, toxic-appearing or diaphoretic.   HENT:      Head: Normocephalic and atraumatic.      Right Ear: External ear normal.      Left Ear: External ear normal.      Nose: Nose normal.   Eyes:      General: No scleral icterus.        Right eye: No discharge.         Left eye: No discharge.      Extraocular Movements: Extraocular movements intact.      Conjunctiva/sclera: Conjunctivae normal.   Pulmonary:      Effort: Pulmonary effort is normal.   Musculoskeletal:         General: Normal range of motion.      Cervical back: Normal range of motion.   Neurological:      General: No focal deficit present. "      Mental Status: He is alert and oriented to person, place, and time.   Psychiatric:         Mood and Affect: Mood normal.         Behavior: Behavior normal.

## 2024-10-28 DIAGNOSIS — I10 BENIGN ESSENTIAL HYPERTENSION: ICD-10-CM

## 2024-10-29 RX ORDER — LOSARTAN POTASSIUM 100 MG/1
100 TABLET ORAL DAILY
Qty: 90 TABLET | Refills: 0 | Status: SHIPPED | OUTPATIENT
Start: 2024-10-29

## 2024-10-30 ENCOUNTER — OFFICE VISIT (OUTPATIENT)
Dept: FAMILY MEDICINE CLINIC | Facility: CLINIC | Age: 64
End: 2024-10-30

## 2024-10-30 VITALS — DIASTOLIC BLOOD PRESSURE: 80 MMHG | OXYGEN SATURATION: 99 % | SYSTOLIC BLOOD PRESSURE: 136 MMHG | HEART RATE: 68 BPM

## 2024-10-30 DIAGNOSIS — Z82.49 FAMILY HISTORY OF HYPERTENSION: ICD-10-CM

## 2024-10-30 DIAGNOSIS — I10 BENIGN ESSENTIAL HYPERTENSION: Primary | ICD-10-CM

## 2024-10-30 DIAGNOSIS — R00.1 BRADYCARDIA: ICD-10-CM

## 2024-10-30 NOTE — PROGRESS NOTES
Assessment/Plan:   Diagnoses and all orders for this visit:    Benign essential hypertension  - BP in the office on my repeat = 136/80 x2   - HR 68 and Pulse Ox 99%  - currently on ARB 100mg QD and tolerating it well - cont current regimen   - unable to tolerate diuretics   - BB contraindicated as pt with bradycardia  - UTD with PCV20   - UTD with annual Flu vaccine   - UTD with latest COVID Booster   - caution with NSAIDs  - limit Na to 2g/day   - RTO in 3months for BP check - pt aware and agreeable   - The 10-year ASCVD risk score (Zan CAVAZOS, et al., 2019) is: 16%    Values used to calculate the score:      Age: 64 years      Sex: Male      Is Non- : No      Diabetic: No      Tobacco smoker: No      Systolic Blood Pressure: 136 mmHg      Is BP treated: Yes      HDL Cholesterol: 44 mg/dL      Total Cholesterol: 214 mg/dL  Family history of hypertension  Bradycardia          Subjective:    Patient ID: Tai Brooke is a 64 y.o. male.  HPI  65yo M presents to the office for BP check   - currently on ARB 100mg QD  - BP in the office = 160/70 and on my repeat 136/80 x2   - HR 68 and Pulse Ox 99%  - took ARB before 8am this AM and had 10oz cup of medium roast this AM   - UTD with PCV20   - UTD with annual Flu vaccine   - UTD with latest COVID Booster   - denies F/C/N/V/HA/tingling in back of neck/CP/palpitations/SOB/wheezing/WATSON/abd pain/LE edema       The following portions of the patient's history were reviewed and updated as appropriate: allergies, current medications, past family history, past medical history, past social history, past surgical history and problem list.    Review of Systems  as per HPI    Objective:  /80 (BP Location: Left arm, Patient Position: Sitting, Cuff Size: Standard)   Pulse 68   SpO2 99%    Physical Exam  Vitals reviewed.   Constitutional:       General: He is not in acute distress.     Appearance: Normal appearance. He is not ill-appearing,  toxic-appearing or diaphoretic.   HENT:      Head: Normocephalic and atraumatic.      Right Ear: External ear normal.      Left Ear: External ear normal.      Nose: Nose normal.   Eyes:      General: No scleral icterus.        Right eye: No discharge.         Left eye: No discharge.      Extraocular Movements: Extraocular movements intact.      Conjunctiva/sclera: Conjunctivae normal.   Cardiovascular:      Rate and Rhythm: Normal rate and regular rhythm.      Heart sounds: Normal heart sounds. No murmur heard.     No friction rub. No gallop.   Pulmonary:      Effort: Pulmonary effort is normal. No respiratory distress.      Breath sounds: Normal breath sounds. No stridor. No wheezing, rhonchi or rales.   Abdominal:      Palpations: Abdomen is soft.   Musculoskeletal:         General: Normal range of motion.      Cervical back: Normal range of motion.      Right lower leg: No edema.      Left lower leg: No edema.   Skin:     General: Skin is warm.   Neurological:      General: No focal deficit present.      Mental Status: He is alert and oriented to person, place, and time.   Psychiatric:         Mood and Affect: Mood normal.         Behavior: Behavior normal.

## 2024-11-10 DIAGNOSIS — K21.9 GASTROESOPHAGEAL REFLUX DISEASE WITHOUT ESOPHAGITIS: ICD-10-CM

## 2024-11-10 RX ORDER — PANTOPRAZOLE SODIUM 20 MG/1
20 TABLET, DELAYED RELEASE ORAL DAILY
Qty: 90 TABLET | Refills: 1 | Status: SHIPPED | OUTPATIENT
Start: 2024-11-10

## 2024-12-11 ENCOUNTER — TELEPHONE (OUTPATIENT)
Dept: SLEEP CENTER | Facility: CLINIC | Age: 64
End: 2024-12-11

## 2024-12-11 NOTE — TELEPHONE ENCOUNTER
"12/11 LM FOR PT THAT [employer] DOES NOT PARTICIPATE WITH [employer] MY DIRECT BLUE LV \"EPO\" PLAN. CAN STILL BE SEEN BUT MAY RECEIVE BILL.  I WILL LEAVE HIM THE SCHEDULE UNTIL I HEAR BACK FROM HIM.    PT LEFT VOICE MESSAGE STATI\NG HE SPOKE W/ INS[employer].  [employer] IS TO SUBMIT BILL AND WILL PT TAKE IT FROM THERE IF NEEDING TO SPEAK TO INS REP RE: APPT  "

## 2024-12-17 ENCOUNTER — OFFICE VISIT (OUTPATIENT)
Dept: SLEEP CENTER | Facility: CLINIC | Age: 64
End: 2024-12-17
Payer: COMMERCIAL

## 2024-12-17 VITALS
DIASTOLIC BLOOD PRESSURE: 81 MMHG | HEIGHT: 65 IN | SYSTOLIC BLOOD PRESSURE: 128 MMHG | WEIGHT: 178 LBS | BODY MASS INDEX: 29.66 KG/M2

## 2024-12-17 DIAGNOSIS — G47.33 OSA (OBSTRUCTIVE SLEEP APNEA): Primary | ICD-10-CM

## 2024-12-17 PROCEDURE — 99213 OFFICE O/P EST LOW 20 MIN: CPT | Performed by: PSYCHIATRY & NEUROLOGY

## 2024-12-17 NOTE — ASSESSMENT & PLAN NOTE
-doing great, treatment is beneficial and effective  -Ahi is normal with current settings  -will renew supplies   Orders:  •  PAP DME Resupply/Reorder

## 2024-12-17 NOTE — PROGRESS NOTES
Name: Tai Brooke      : 1960      MRN: [id number]  Encounter Provider: Guero Hankins MD  Encounter Date: 2024   Encounter department: [facility] [place]  :  Assessment & Plan  CECILIA (obstructive sleep apnea)  -doing great, treatment is beneficial and effective  -Ahi is normal with current settings  -will renew supplies   Orders:  •  PAP DME Resupply/Reorder        History of Present Illness   HPI          Mr Brooke returns in follow-up for obstructive sleep apnea.  In brief, he had a home sleep test in 2023 which showed severe obstructive sleep apnea, respiratory event index 30, results worse in the supine position.  Medical history includes hypertension, hyperlipidemia    Cc-- no concerns, happy with sleep quality    He notes he is happy with his sleep quality and level of alertness during the day.  He finds CPAP beneficial with better sleep and less daytime sleepiness.  He notes he uses his machine regularly and denies any side effects.    He goes to bed at 11 PM, is asleep within 30-45 minutes.  He has had problems falling asleep for years.  Ruminates a lot, does not get frustrated.  Many nights it is hard to fall asleep.  He typically sleeps through the night and is up at 7 AM, sleeping 7 hours a night.    He is sometimes refreshed upon awakening, sometimes gets sleepy during the day.  He does not typically take naps and very rarely dozes (at end of the day watching TV).    He shares a bed at home, is not aware of snoring with CPAP    PAP Data  Airsense 11 auto set at 7-17 cm h20  AHI 1.7  Average session 6 hr 53 min  Use /90 days  No significant leakage  95% pressure - 13.7 cm h20     Caffeine- 2-3 cups of coffee a day, small can of soda      Sitting and reading: (Patient-Rptd) (P) Slight chance of dozing  Watching TV: (Patient-Rptd) (P) Slight chance of dozing  Sitting, inactive in a public place (e.g. a theatre or a meeting): (Patient-Rptd) (P) Would never  "doze  As a passenger in a car for an hour without a break: (Patient-Rptd) (P) Would never doze  Lying down to rest in the afternoon when circumstances permit: (Patient-Rptd) (P) Slight chance of dozing  Sitting and talking to someone: (Patient-Rptd) (P) Would never doze  Sitting quietly after a lunch without alcohol: (Patient-Rptd) (P) Would never doze  In a car, while stopped for a few minutes in traffic: (Patient-Rptd) (P) Would never doze  Total score: (Patient-Rptd) (P) 3     Review of Systems  Pertinent positives/negatives included in HPI and also as noted:       Objective   /81   Ht 5' 5\" (1.651 m)   Wt 80.7 kg (178 lb)   BMI 29.62 kg/m²        Physical Exam    Data  Lab Results   Component Value Date    HGB 16.7 07/12/2024    HCT 52.2 (H) 07/12/2024    MCV 89 07/12/2024      Lab Results   Component Value Date    CALCIUM 8.9 11/13/2019    K 4.1 08/08/2024    CO2 29 08/08/2024     08/08/2024    BUN 20 08/08/2024    CREATININE 1.19 08/08/2024     No results found for: \"IRON\", \"TIBC\", \"FERRITIN\"  Lab Results   Component Value Date    AST 22 07/12/2024    ALT 23 07/12/2024         "

## 2024-12-18 ENCOUNTER — TELEPHONE (OUTPATIENT)
Dept: SLEEP CENTER | Facility: CLINIC | Age: 64
End: 2024-12-18

## 2024-12-23 LAB

## 2025-01-21 LAB
CHOLEST SERPL-MCNC: 210 MG/DL (ref 100–199)
HDLC SERPL-MCNC: 46 MG/DL
LDLC SERPL CALC-MCNC: 140 MG/DL (ref 0–99)
PSA SERPL-MCNC: 4.2 NG/ML (ref 0–4)
SL AMB VLDL CHOLESTEROL CALC: 24 MG/DL (ref 5–40)
TRIGL SERPL-MCNC: 134 MG/DL (ref 0–149)

## 2025-01-23 ENCOUNTER — RESULTS FOLLOW-UP (OUTPATIENT)
Dept: FAMILY MEDICINE CLINIC | Facility: CLINIC | Age: 65
End: 2025-01-23

## 2025-01-23 DIAGNOSIS — R97.20 ELEVATED PSA: Primary | ICD-10-CM

## 2025-01-25 DIAGNOSIS — I10 BENIGN ESSENTIAL HYPERTENSION: ICD-10-CM

## 2025-01-25 RX ORDER — LOSARTAN POTASSIUM 100 MG/1
100 TABLET ORAL DAILY
Qty: 90 TABLET | Refills: 0 | Status: SHIPPED | OUTPATIENT
Start: 2025-01-25

## 2025-01-28 RX ORDER — LOSARTAN POTASSIUM 50 MG/1
1 TABLET ORAL DAILY
COMMUNITY
Start: 2025-01-09

## 2025-01-29 ENCOUNTER — OFFICE VISIT (OUTPATIENT)
Dept: FAMILY MEDICINE CLINIC | Facility: CLINIC | Age: 65
End: 2025-01-29
Payer: COMMERCIAL

## 2025-01-29 ENCOUNTER — TELEPHONE (OUTPATIENT)
Age: 65
End: 2025-01-29

## 2025-01-29 VITALS
SYSTOLIC BLOOD PRESSURE: 136 MMHG | HEIGHT: 65 IN | RESPIRATION RATE: 16 BRPM | DIASTOLIC BLOOD PRESSURE: 70 MMHG | WEIGHT: 185 LBS | BODY MASS INDEX: 30.82 KG/M2 | HEART RATE: 68 BPM | OXYGEN SATURATION: 97 %

## 2025-01-29 DIAGNOSIS — R73.9 ELEVATED BLOOD SUGAR: ICD-10-CM

## 2025-01-29 DIAGNOSIS — R00.1 BRADYCARDIA: ICD-10-CM

## 2025-01-29 DIAGNOSIS — E78.2 MIXED HYPERLIPIDEMIA: ICD-10-CM

## 2025-01-29 DIAGNOSIS — R97.20 ELEVATED PSA: ICD-10-CM

## 2025-01-29 DIAGNOSIS — I10 BENIGN ESSENTIAL HYPERTENSION: Primary | ICD-10-CM

## 2025-01-29 DIAGNOSIS — Z82.49 FAMILY HISTORY OF HYPERTENSION: ICD-10-CM

## 2025-01-29 DIAGNOSIS — Z78.9 STATIN INTOLERANCE: ICD-10-CM

## 2025-01-29 PROCEDURE — 99214 OFFICE O/P EST MOD 30 MIN: CPT | Performed by: FAMILY MEDICINE

## 2025-01-29 NOTE — ASSESSMENT & PLAN NOTE
- BP in the office = 144/82 and on my repeat 136/70   - HR 68 and Pulse Ox 97%  - currently on ARB 100mg QD and tolerating it well - cont current regimen   - unable to tolerate diuretics   - BB contraindicated as pt with bradycardia  - UTD with PCV20   - UTD with annual Flu vaccine   - UTD with latest COVID Booster   - caution with NSAIDs  - limit Na to 2g/day   - RTO in 3months, with repeat labs, for BP check - pt aware and agreeable   Orders:    Albumin / creatinine urine ratio; Future

## 2025-01-29 NOTE — ASSESSMENT & PLAN NOTE
- Lipids (1/20/2025): , , HDL 46,    - unable to tolerate statins or tricor 2/2 confusion   - currently using Fish Oil and Red Yeast Rice   - diet/exercise   - repeat labs in 6months and if no change t/c referral to Cardio   - The 10-year ASCVD risk score (Zan CAVAZOS, et al., 2019) is: 15.4%    Values used to calculate the score:      Age: 64 years      Sex: Male      Is Non- : No      Diabetic: No      Tobacco smoker: No      Systolic Blood Pressure: 136 mmHg      Is BP treated: Yes      HDL Cholesterol: 46 mg/dL      Total Cholesterol: 210 mg/dL    Orders:    Lipid panel; Future

## 2025-01-29 NOTE — PROGRESS NOTES
I personally called this patient.  Patient and her  both are doing well.  They did not have any GI side effects.  I did look up in the medical literature about the raw shrimp ingestion issues.  Recommendation is to not give any antibiotics unless patient develops  Symptoms related to infection..  Patient is currently not having any symptoms.  She was encouraged to drink more fluids and watch for any fever, nausea, vomiting, diarrhea.  Avoid raw shrimps, patient agreed.   Name: Tai Brooke      : 1960      MRN: [id number]  Encounter Provider: Clair Issa DO  Encounter Date: 2025   Encounter department: [facility] [place]  :  Assessment & Plan  Benign essential hypertension  - BP in the office = 144/82 and on my repeat 136/70   - HR 68 and Pulse Ox 97%  - currently on ARB 100mg QD and tolerating it well - cont current regimen   - unable to tolerate diuretics   - BB contraindicated as pt with bradycardia  - UTD with PCV20   - UTD with annual Flu vaccine   - UTD with latest COVID Booster   - caution with NSAIDs  - limit Na to 2g/day   - RTO in 3months, with repeat labs, for BP check - pt aware and agreeable   Orders:    Albumin / creatinine urine ratio; Future    Family history of hypertension    Orders:    Albumin / creatinine urine ratio; Future    Bradycardia         Elevated PSA  - PSA 4.2 <-- 3.4   - referred to Uro for further eval and treatment        Mixed hyperlipidemia  - Lipids (2025): , , HDL 46,    - unable to tolerate statins or tricor 2/2 confusion   - currently using Fish Oil and Red Yeast Rice   - diet/exercise   - repeat labs in 6months and if no change t/c referral to Cardio   - The 10-year ASCVD risk score (Zan DK, et al., 2019) is: 15.4%    Values used to calculate the score:      Age: 64 years      Sex: Male      Is Non- : No      Diabetic: No      Tobacco smoker: No      Systolic Blood Pressure: 136 mmHg      Is BP treated: Yes      HDL Cholesterol: 46 mg/dL      Total Cholesterol: 210 mg/dL    Orders:    Lipid panel; Future    Statin intolerance         Elevated blood sugar  - has been eating more sugar lately  Orders:    Hemoglobin A1C; Future    Comprehensive metabolic panel; Future           History of Present Illness   HPI  63yo M presents to the office for BP check   - currently on ARB 100mg QD  - BP in the office = 144/82 and on my repeat 136/70   - HR 68 and Pulse Ox  "97%  - UTD with PCV20   - UTD with annual Flu vaccine   - UTD with latest COVID Booster   -   - elevated PSA   - has been eating more sugar lately  - denies F/C/N/V/HA/tingling in back of neck/CP/palpitations/SOB/wheezing/WATSON/abd pain/LE edema       Review of Systems as per HPI     Objective   /82   Pulse 68   Resp 16   Ht 5' 5\" (1.651 m)   Wt 83.9 kg (185 lb)   SpO2 97%   BMI 30.79 kg/m²      Physical Exam  Vitals reviewed.   Constitutional:       General: He is not in acute distress.     Appearance: Normal appearance. He is not ill-appearing, toxic-appearing or diaphoretic.   HENT:      Head: Normocephalic and atraumatic.      Right Ear: External ear normal.      Left Ear: External ear normal.      Nose: Nose normal.   Eyes:      General: No scleral icterus.        Right eye: No discharge.         Left eye: No discharge.      Extraocular Movements: Extraocular movements intact.      Conjunctiva/sclera: Conjunctivae normal.   Cardiovascular:      Rate and Rhythm: Normal rate and regular rhythm.      Heart sounds: Normal heart sounds.   Pulmonary:      Effort: Pulmonary effort is normal. No respiratory distress.      Breath sounds: Normal breath sounds. No stridor. No wheezing, rhonchi or rales.   Abdominal:      Palpations: Abdomen is soft.   Musculoskeletal:         General: Normal range of motion.      Right lower leg: No edema.      Left lower leg: No edema.   Skin:     General: Skin is warm.   Neurological:      General: No focal deficit present.      Mental Status: He is alert and oriented to person, place, and time.   Psychiatric:         Mood and Affect: Mood normal.         Behavior: Behavior normal.         "

## 2025-01-30 ENCOUNTER — TELEPHONE (OUTPATIENT)
Dept: GASTROENTEROLOGY | Facility: CLINIC | Age: 65
End: 2025-01-30

## 2025-04-22 DIAGNOSIS — I10 BENIGN ESSENTIAL HYPERTENSION: ICD-10-CM

## 2025-04-22 RX ORDER — LOSARTAN POTASSIUM 100 MG/1
100 TABLET ORAL DAILY
Qty: 90 TABLET | Refills: 1 | Status: SHIPPED | OUTPATIENT
Start: 2025-04-22

## 2025-04-24 DIAGNOSIS — E78.2 MIXED HYPERLIPIDEMIA: Primary | ICD-10-CM

## 2025-04-25 ENCOUNTER — RESULTS FOLLOW-UP (OUTPATIENT)
Dept: FAMILY MEDICINE CLINIC | Facility: CLINIC | Age: 65
End: 2025-04-25

## 2025-04-25 LAB
ALBUMIN SERPL-MCNC: 4.6 G/DL (ref 3.9–4.9)
ALBUMIN/CREAT UR: 6 MG/G CREAT (ref 0–29)
ALP SERPL-CCNC: 64 IU/L (ref 44–121)
ALT SERPL-CCNC: 27 IU/L (ref 0–44)
AST SERPL-CCNC: 25 IU/L (ref 0–40)
BILIRUB SERPL-MCNC: 0.5 MG/DL (ref 0–1.2)
BUN SERPL-MCNC: 19 MG/DL (ref 8–27)
BUN/CREAT SERPL: 18 (ref 10–24)
CALCIUM SERPL-MCNC: 9.6 MG/DL (ref 8.6–10.2)
CHLORIDE SERPL-SCNC: 101 MMOL/L (ref 96–106)
CHOLEST SERPL-MCNC: 179 MG/DL (ref 100–199)
CHOLEST/HDLC SERPL: 4 RATIO (ref 0–5)
CO2 SERPL-SCNC: 23 MMOL/L (ref 20–29)
CREAT SERPL-MCNC: 1.07 MG/DL (ref 0.76–1.27)
CREAT UR-MCNC: 215.8 MG/DL
EGFR: 78 ML/MIN/1.73
GLOBULIN SER-MCNC: 2 G/DL (ref 1.5–4.5)
GLUCOSE SERPL-MCNC: 106 MG/DL (ref 70–99)
HBA1C MFR BLD: 5.9 % (ref 4.8–5.6)
HDLC SERPL-MCNC: 45 MG/DL
LDLC SERPL CALC-MCNC: 116 MG/DL (ref 0–99)
MICROALBUMIN UR-MCNC: 12.6 UG/ML
POTASSIUM SERPL-SCNC: 4.4 MMOL/L (ref 3.5–5.2)
PROT SERPL-MCNC: 6.6 G/DL (ref 6–8.5)
SL AMB VLDL CHOLESTEROL CALC: 18 MG/DL (ref 5–40)
SODIUM SERPL-SCNC: 142 MMOL/L (ref 134–144)
TRIGL SERPL-MCNC: 98 MG/DL (ref 0–149)

## 2025-04-29 ENCOUNTER — OFFICE VISIT (OUTPATIENT)
Dept: FAMILY MEDICINE CLINIC | Facility: CLINIC | Age: 65
End: 2025-04-29
Payer: COMMERCIAL

## 2025-04-29 VITALS
SYSTOLIC BLOOD PRESSURE: 126 MMHG | HEART RATE: 81 BPM | HEIGHT: 65 IN | DIASTOLIC BLOOD PRESSURE: 80 MMHG | OXYGEN SATURATION: 99 % | WEIGHT: 183 LBS | BODY MASS INDEX: 30.49 KG/M2

## 2025-04-29 DIAGNOSIS — I10 BENIGN ESSENTIAL HYPERTENSION: Primary | ICD-10-CM

## 2025-04-29 DIAGNOSIS — Z78.9 STATIN INTOLERANCE: ICD-10-CM

## 2025-04-29 DIAGNOSIS — E78.2 MIXED HYPERLIPIDEMIA: ICD-10-CM

## 2025-04-29 DIAGNOSIS — G47.33 OBSTRUCTIVE SLEEP APNEA: ICD-10-CM

## 2025-04-29 DIAGNOSIS — R97.20 ELEVATED PSA: ICD-10-CM

## 2025-04-29 DIAGNOSIS — R73.03 PREDIABETES: ICD-10-CM

## 2025-04-29 DIAGNOSIS — Z82.49 FAMILY HISTORY OF HYPERTENSION: ICD-10-CM

## 2025-04-29 PROCEDURE — 99214 OFFICE O/P EST MOD 30 MIN: CPT | Performed by: FAMILY MEDICINE

## 2025-04-29 NOTE — PROGRESS NOTES
Name: Tai Brooke      : 1960      MRN: [id number]  Encounter Provider: Clair Issa DO  Encounter Date: 2025   Encounter department: [facility]  :  Assessment & Plan  Benign essential hypertension  - BP in the office = 135/80 and on my repeat 126/80  - HR 81 and Pulse Ox 99%  - stable renal function and urine microalbumin   - currently on ARB 100mg QD and tolerating it well - cont current regimen   - unable to tolerate diuretics   - BB contraindicated as pt with bradycardia  - UTD with PCV20   - UTD with annual Flu vaccine   - UTD with latest COVID Booster   - caution with NSAIDs  - limit Na to 2g/day   - RTO in 6months, with repeat labs, for f/u and annual exam - pt aware and agreeable        Family history of hypertension         Mixed hyperlipidemia  -  <-- 140   - unable to tolerate statins or tricor 2/2 confusion   - currently using Fish Oil and Red Yeast Rice   - diet/exercise   - repeat labs in 6months  - The 10-year ASCVD risk score (Zan CAVAZOS, et al., 2019) is: 12.1%    Values used to calculate the score:      Age: 64 years      Sex: Male      Is Non- : No      Diabetic: No      Tobacco smoker: No      Systolic Blood Pressure: 126 mmHg      Is BP treated: Yes      HDL Cholesterol: 45 mg/dL      Total Cholesterol: 179 mg/dL  Orders:    Lipid panel; Future    Statin intolerance         Prediabetes  - A1c = 5.9 <-- 5.6   - diet/exercise - caution with sugar intake   - repeat labs in 6months  Orders:    Hemoglobin A1C; Future    Basic metabolic panel; Future    TSH, 3rd generation with Free T4 reflex    BMI 30.0-30.9,adult         Elevated PSA  - has been referred to Uro   Orders:    PSA, Total Screen; Future    Obstructive sleep apnea  - follows with Sleep Med               History of Present Illness   HPI  65yo M presents to the office for BP check   - currently on ARB 100mg QD  - BP in the office = 135/80 and on my repeat 126/80  - HR  "81 and Pulse Ox 99%  - UTD with PCV20   - UTD with annual Flu vaccine   - UTD with latest COVID Booster   - reviewed labs done 4/24/2025   -  <-- 140   - A1c = 5.9 <-- 5.6   - elevated PSA   - has been eating more sugar lately  - denies F/C/N/V/HA/tingling in back of neck/CP/palpitations/SOB/wheezing/WATSON/abd pain/LE edema     Review of Systems as per HPI     Objective   /80 (BP Location: Left arm, Patient Position: Sitting, Cuff Size: Standard)   Pulse 81   Ht 5' 5\" (1.651 m)   Wt 83 kg (183 lb)   SpO2 99%   BMI 30.45 kg/m²      Physical Exam  Vitals reviewed.   Constitutional:       General: He is not in acute distress.     Appearance: Normal appearance. He is not ill-appearing, toxic-appearing or diaphoretic.   HENT:      Head: Normocephalic and atraumatic.      Right Ear: External ear normal.      Left Ear: External ear normal.      Nose: Nose normal.   Eyes:      General: No scleral icterus.        Right eye: No discharge.         Left eye: No discharge.      Extraocular Movements: Extraocular movements intact.      Conjunctiva/sclera: Conjunctivae normal.   Cardiovascular:      Rate and Rhythm: Normal rate and regular rhythm.      Heart sounds: Normal heart sounds. No murmur heard.     No friction rub. No gallop.   Pulmonary:      Effort: Pulmonary effort is normal. No respiratory distress.      Breath sounds: Normal breath sounds. No stridor. No wheezing, rhonchi or rales.   Abdominal:      Palpations: Abdomen is soft.   Musculoskeletal:         General: Normal range of motion.      Cervical back: Normal range of motion.      Right lower leg: No edema.      Left lower leg: No edema.   Skin:     General: Skin is warm.   Neurological:      General: No focal deficit present.      Mental Status: He is alert and oriented to person, place, and time.   Psychiatric:         Mood and Affect: Mood normal.         Behavior: Behavior normal.         "

## 2025-04-29 NOTE — ASSESSMENT & PLAN NOTE
-  <-- 140   - unable to tolerate statins or tricor 2/2 confusion   - currently using Fish Oil and Red Yeast Rice   - diet/exercise   - repeat labs in 6months  - The 10-year ASCVD risk score (Zan CAVAZOS, et al., 2019) is: 12.1%    Values used to calculate the score:      Age: 64 years      Sex: Male      Is Non- : No      Diabetic: No      Tobacco smoker: No      Systolic Blood Pressure: 126 mmHg      Is BP treated: Yes      HDL Cholesterol: 45 mg/dL      Total Cholesterol: 179 mg/dL  Orders:    Lipid panel; Future

## 2025-04-29 NOTE — ASSESSMENT & PLAN NOTE
- BP in the office = 135/80 and on my repeat 126/80  - HR 81 and Pulse Ox 99%  - stable renal function and urine microalbumin   - currently on ARB 100mg QD and tolerating it well - cont current regimen   - unable to tolerate diuretics   - BB contraindicated as pt with bradycardia  - UTD with PCV20   - UTD with annual Flu vaccine   - UTD with latest COVID Booster   - caution with NSAIDs  - limit Na to 2g/day   - RTO in 6months, with repeat labs, for f/u and annual exam - pt aware and agreeable

## 2025-05-08 DIAGNOSIS — K21.9 GASTROESOPHAGEAL REFLUX DISEASE WITHOUT ESOPHAGITIS: ICD-10-CM

## 2025-05-09 RX ORDER — PANTOPRAZOLE SODIUM 20 MG/1
20 TABLET, DELAYED RELEASE ORAL DAILY
Qty: 90 TABLET | Refills: 1 | Status: SHIPPED | OUTPATIENT
Start: 2025-05-09